# Patient Record
Sex: MALE | Race: WHITE | Employment: OTHER | URBAN - METROPOLITAN AREA
[De-identification: names, ages, dates, MRNs, and addresses within clinical notes are randomized per-mention and may not be internally consistent; named-entity substitution may affect disease eponyms.]

---

## 2017-02-15 ENCOUNTER — APPOINTMENT (OUTPATIENT)
Dept: CT IMAGING | Facility: HOSPITAL | Age: 52
DRG: 854 | End: 2017-02-15
Attending: EMERGENCY MEDICINE
Payer: COMMERCIAL

## 2017-02-15 ENCOUNTER — APPOINTMENT (OUTPATIENT)
Dept: MRI IMAGING | Facility: HOSPITAL | Age: 52
DRG: 854 | End: 2017-02-15
Attending: ORTHOPAEDIC SURGERY
Payer: COMMERCIAL

## 2017-02-15 ENCOUNTER — APPOINTMENT (OUTPATIENT)
Dept: GENERAL RADIOLOGY | Facility: HOSPITAL | Age: 52
DRG: 854 | End: 2017-02-15
Attending: RADIOLOGY
Payer: COMMERCIAL

## 2017-02-15 ENCOUNTER — APPOINTMENT (OUTPATIENT)
Dept: ULTRASOUND IMAGING | Facility: HOSPITAL | Age: 52
DRG: 854 | End: 2017-02-15
Attending: RADIOLOGY
Payer: COMMERCIAL

## 2017-02-15 ENCOUNTER — HOSPITAL ENCOUNTER (INPATIENT)
Facility: HOSPITAL | Age: 52
LOS: 8 days | Discharge: NURSING, REHAB OR LONG-TERM CARE | DRG: 854 | End: 2017-02-27
Attending: EMERGENCY MEDICINE | Admitting: HOSPITALIST
Payer: COMMERCIAL

## 2017-02-15 ENCOUNTER — APPOINTMENT (OUTPATIENT)
Dept: GENERAL RADIOLOGY | Facility: HOSPITAL | Age: 52
DRG: 854 | End: 2017-02-15
Attending: EMERGENCY MEDICINE
Payer: COMMERCIAL

## 2017-02-15 DIAGNOSIS — M79.2 NEUROPATHIC PAIN: ICD-10-CM

## 2017-02-15 DIAGNOSIS — M25.451 HIP JOINT EFFUSION, RIGHT: Primary | ICD-10-CM

## 2017-02-15 DIAGNOSIS — D68.9 COAGULOPATHY (HCC): ICD-10-CM

## 2017-02-15 DIAGNOSIS — I82.4Z1 ACUTE DEEP VEIN THROMBOSIS (DVT) OF DISTAL END OF RIGHT LOWER EXTREMITY (HCC): ICD-10-CM

## 2017-02-15 DIAGNOSIS — D50.0 IRON DEFICIENCY ANEMIA DUE TO CHRONIC BLOOD LOSS: ICD-10-CM

## 2017-02-15 DIAGNOSIS — M25.551 HIP PAIN, ACUTE, RIGHT: ICD-10-CM

## 2017-02-15 PROBLEM — M25.559 HIP PAIN, ACUTE: Status: ACTIVE | Noted: 2017-02-15

## 2017-02-15 LAB
ANION GAP SERPL CALC-SCNC: 5 MMOL/L (ref 0–18)
BASOPHILS # BLD: 0 K/UL (ref 0–0.2)
BASOPHILS NFR BLD: 0 %
BASOPHILS NFR FLD: 0 %
BUN SERPL-MCNC: 25 MG/DL (ref 8–20)
BUN/CREAT SERPL: 23.8 (ref 10–20)
CALCIUM SERPL-MCNC: 8.5 MG/DL (ref 8.5–10.5)
CHLORIDE SERPL-SCNC: 104 MMOL/L (ref 95–110)
CO2 SERPL-SCNC: 25 MMOL/L (ref 22–32)
CREAT SERPL-MCNC: 1.05 MG/DL (ref 0.5–1.5)
CRP SERPL-MCNC: 13.4 MG/DL (ref 0–0.9)
EOSINOPHIL # BLD: 0 K/UL (ref 0–0.7)
EOSINOPHIL NFR BLD: 0 %
EOSINOPHIL NFR FLD: 0 %
ERYTHROCYTE [DISTWIDTH] IN BLOOD BY AUTOMATED COUNT: 14.9 % (ref 11–15)
ERYTHROCYTE [SEDIMENTATION RATE] IN BLOOD: 50 MM/HR (ref 0–20)
FLUAV + FLUBV RNA SPEC NAA+PROBE: NEGATIVE
GLUCOSE BLDC GLUCOMTR-MCNC: 101 MG/DL (ref 70–99)
GLUCOSE BLDC GLUCOMTR-MCNC: 128 MG/DL (ref 70–99)
GLUCOSE BLDC GLUCOMTR-MCNC: 128 MG/DL (ref 70–99)
GLUCOSE BLDC GLUCOMTR-MCNC: 73 MG/DL (ref 70–99)
GLUCOSE SERPL-MCNC: 200 MG/DL (ref 70–99)
HCT VFR BLD AUTO: 38.6 % (ref 41–52)
HGB BLD-MCNC: 12.8 G/DL (ref 13.5–17.5)
HIV1+2 AB SERPL QL IA: REACTIVE
LYMPHOCYTES # BLD: 0.1 K/UL (ref 1–4)
LYMPHOCYTES NFR BLD: 1 %
LYMPHOCYTES NFR FLD: 0 %
MCH RBC QN AUTO: 28.7 PG (ref 27–32)
MCHC RBC AUTO-ENTMCNC: 33.2 G/DL (ref 32–37)
MCV RBC AUTO: 86.3 FL (ref 80–100)
MONOCYTES # BLD: 0.3 K/UL (ref 0–1)
MONOCYTES NFR BLD: 4 %
MONOCYTES NFR FLD: 0 %
MRSA DNA SPEC QL NAA+PROBE: POSITIVE
NEUTROPHILS # BLD AUTO: 6.4 K/UL (ref 1.8–7.7)
NEUTROPHILS NFR BLD: 87 %
NEUTROPHILS NFR FLD: 0 %
NEUTS BAND NFR BLD: 8 %
OSMOLALITY UR CALC.SUM OF ELEC: 288 MOSM/KG (ref 275–295)
PLATELET # BLD AUTO: 183 K/UL (ref 140–400)
PMV BLD AUTO: 7.5 FL (ref 7.4–10.3)
POTASSIUM SERPL-SCNC: 3.9 MMOL/L (ref 3.3–5.1)
RBC # BLD AUTO: 4.47 M/UL (ref 4.5–5.9)
RBC # FLD: ABNORMAL /CUMM (ref ?–1)
RHEUMATOID FACT SER QL: <5 IU/ML
SODIUM SERPL-SCNC: 134 MMOL/L (ref 136–144)
WBC # BLD AUTO: 6.8 K/UL (ref 4–11)
WBC # FLD: ABNORMAL /CUMM

## 2017-02-15 PROCEDURE — 0S993ZZ DRAINAGE OF RIGHT HIP JOINT, PERCUTANEOUS APPROACH: ICD-10-PCS | Performed by: RADIOLOGY

## 2017-02-15 PROCEDURE — 20611 DRAIN/INJ JOINT/BURSA W/US: CPT

## 2017-02-15 PROCEDURE — 99222 1ST HOSP IP/OBS MODERATE 55: CPT | Performed by: HOSPITALIST

## 2017-02-15 PROCEDURE — 73700 CT LOWER EXTREMITY W/O DYE: CPT

## 2017-02-15 PROCEDURE — 73560 X-RAY EXAM OF KNEE 1 OR 2: CPT

## 2017-02-15 RX ORDER — 0.9 % SODIUM CHLORIDE 0.9 %
VIAL (ML) INJECTION
Status: COMPLETED
Start: 2017-02-15 | End: 2017-02-15

## 2017-02-15 RX ORDER — HYDROMORPHONE HYDROCHLORIDE 1 MG/ML
0.5 INJECTION, SOLUTION INTRAMUSCULAR; INTRAVENOUS; SUBCUTANEOUS EVERY 2 HOUR PRN
Status: DISCONTINUED | OUTPATIENT
Start: 2017-02-15 | End: 2017-02-21

## 2017-02-15 RX ORDER — MORPHINE SULFATE 4 MG/ML
4 INJECTION, SOLUTION INTRAMUSCULAR; INTRAVENOUS ONCE
Status: COMPLETED | OUTPATIENT
Start: 2017-02-15 | End: 2017-02-15

## 2017-02-15 RX ORDER — HYDROCODONE BITARTRATE AND ACETAMINOPHEN 10; 325 MG/1; MG/1
1 TABLET ORAL EVERY 4 HOURS PRN
Status: DISCONTINUED | OUTPATIENT
Start: 2017-02-15 | End: 2017-02-21

## 2017-02-15 RX ORDER — SODIUM CHLORIDE 9 MG/ML
INJECTION, SOLUTION INTRAVENOUS CONTINUOUS
Status: DISPENSED | OUTPATIENT
Start: 2017-02-15 | End: 2017-02-15

## 2017-02-15 RX ORDER — KETOROLAC TROMETHAMINE 30 MG/ML
60 INJECTION, SOLUTION INTRAMUSCULAR; INTRAVENOUS ONCE
Status: COMPLETED | OUTPATIENT
Start: 2017-02-15 | End: 2017-02-15

## 2017-02-15 RX ORDER — HEPARIN SODIUM 5000 [USP'U]/ML
5000 INJECTION, SOLUTION INTRAVENOUS; SUBCUTANEOUS EVERY 12 HOURS
Status: DISCONTINUED | OUTPATIENT
Start: 2017-02-15 | End: 2017-02-16

## 2017-02-15 RX ORDER — ACETAMINOPHEN 325 MG/1
650 TABLET ORAL EVERY 6 HOURS PRN
Status: DISCONTINUED | OUTPATIENT
Start: 2017-02-15 | End: 2017-02-27

## 2017-02-15 RX ORDER — CYCLOBENZAPRINE HCL 10 MG
10 TABLET ORAL 3 TIMES DAILY PRN
Status: DISCONTINUED | OUTPATIENT
Start: 2017-02-15 | End: 2017-02-27

## 2017-02-15 RX ORDER — CYCLOBENZAPRINE HCL 10 MG
10 TABLET ORAL 3 TIMES DAILY PRN
Status: ON HOLD | COMMUNITY
End: 2018-10-20

## 2017-02-15 RX ORDER — DEXTROSE MONOHYDRATE 25 G/50ML
50 INJECTION, SOLUTION INTRAVENOUS AS NEEDED
Status: DISCONTINUED | OUTPATIENT
Start: 2017-02-15 | End: 2017-02-27

## 2017-02-15 RX ORDER — ONDANSETRON 2 MG/ML
4 INJECTION INTRAMUSCULAR; INTRAVENOUS EVERY 6 HOURS PRN
Status: DISCONTINUED | OUTPATIENT
Start: 2017-02-15 | End: 2017-02-27

## 2017-02-15 RX ORDER — OSELTAMIVIR PHOSPHATE 75 MG/1
75 CAPSULE ORAL 2 TIMES DAILY
Status: ON HOLD | COMMUNITY
End: 2017-02-27

## 2017-02-15 RX ORDER — HYDROMORPHONE HYDROCHLORIDE 1 MG/ML
1 INJECTION, SOLUTION INTRAMUSCULAR; INTRAVENOUS; SUBCUTANEOUS EVERY 2 HOUR PRN
Status: DISCONTINUED | OUTPATIENT
Start: 2017-02-15 | End: 2017-02-21

## 2017-02-15 NOTE — PHYSICAL THERAPY NOTE
Spoke with RN Aiden Hanley who reports patient is scheduled for an US guided aspiration to r/o infection. Plan to follow up later today, still awaiting ortho consult as well. Will see patient when appropriate. RN aware.

## 2017-02-15 NOTE — CONSULTS
INFECTIOUS DISEASE CONSULT NOTE    Vishal Morse Patient Status:  Observation    3/31/1965 MRN [id number]   Location [facility] 4W/SW/SE Attending Booker Witt MD   Hosp Day # 0 PCP No evelin tab 1 tablet, 1 tablet, Oral, Q4H PRN  •  HYDROmorphone HCl PF (DILAUDID) 1 MG/ML injection 0.5 mg, 0.5 mg, Intravenous, Q2H PRN **OR** HYDROmorphone HCl PF (DILAUDID) 1 MG/ML injection 1 mg, 1 mg, Intravenous, Q2H PRN  •  Heparin Sodium (Porcine) 5000 UNI MCH  28.7   MCHC  33.2   RDW  14.9   WBC  6.8   PLT  183     Recent Labs   Lab  02/15/17   0624   GLU  200*   BUN  25*   CREATSERUM  1.05   GFRAA  >60   GFRNAA  >60   CA  8.5   NA  134*   K  3.9   CL  104   CO2  25       Microbiology    Reviewed in EMR

## 2017-02-15 NOTE — IMAGING NOTE
PATIENT ARRIVED VIA CART. DI STUDENT SRINATH IRAHETA PRESENT TO INTERPRET. CONSENT OBTAINED.  DR UGALDE NOTIFIED. 126/71 HR 96.  1150  IMAGES DONE AND REVIEWED BY DR UGALDE  SITE VERIFIED AND PEP WITH CHLORAPREP AND 1% LIDOCAINE  SITE ASPIRATED 3

## 2017-02-15 NOTE — PAYOR COMM NOTE
OBSERVATION  Assessment and Plan:    Intractable right hip and knee pain  CT scan suggestive severe degenerative changes with effusion and right hip as well, orthopedics has been consulted along with IR for possible tapping of hip joint.  Will use Dilaudid

## 2017-02-15 NOTE — ED NOTES
Patient c/o right knee pain and right hip pain after fall this AM while using crutches. Patient denies any LOC- admits to being discharged from [address] [place] [address] 2 days ago for prior fall. Swelling noted to right knee- patient c/o of 10/10 pain.   +CMS, li

## 2017-02-15 NOTE — PROCEDURES
[facility]  Procedure Note    Selwyn Majano Patient Status:  Observation    3/31/1965 MRN [id number]   Location [facility] 4W/SW/SE Attending Moises Lucero MD   Hosp Day # 0 PCP No primary care provider on file.      Procedure: u/

## 2017-02-15 NOTE — PLAN OF CARE
Yasmin Brooks RN informed of marcus's past medical hx of HIV, mrsa +, and r/o flu.   I also informed Maxim Sang his primary language is Mongolian and that there are no signed consents on chart pt has questions for dr. Bahenaid 1 mg given ivp as he was on cart read

## 2017-02-15 NOTE — ED PROVIDER NOTES
Patient Seen in: [facility] Emergency Department    History   Patient presents with:  Hip Pain      HPI    Patient presents complaining of severe right hip and knee pain after falling while using his crutches this morning.   Patient states that he otherwise stated in HPI.     Physical Exam       ED Triage Vitals   BP 02/15/17 0310 138/82 mmHg   Pulse 02/15/17 0310 104   Resp 02/15/17 0310 18   Temp 02/15/17 0310 99.6 °F (37.6 °C)   Temp src --    SpO2 02/15/17 0310 97 %   O2 Device 02/15/17 0310 None RAINBOW DRAW LAVENDER   RAINBOW DRAW LIGHT GREEN   RAINBOW DRAW DARK GREEN   RAINBOW DRAW LAVENDER TALL (BNP)   ED/MRSA SCREEN BY PCR-CC   CBC W/ DIFFERENTIAL       Preliminary Radiology Report  [facility], [facility]  [phone] and interventional radiology, Dr. Brianna Flor for care.       Disposition and Plan     Clinical Impression:  Hip pain, acute, right  (primary encounter diagnosis)  Hip joint effusion, right    Disposition:  Admit    Follow-up:  No follow-up provider specified

## 2017-02-15 NOTE — PROGRESS NOTES
ADDENDUM: please see Dr. Ron Vera H&P from this morning. I saw the patient in follow up with the assistance of the language line over the phone.     51M with h/o asthma, HIV not on treatment, \"heart problem\" and \"fast heart rate\" who presents with right h

## 2017-02-15 NOTE — H&P
[address] Colon[facility] Patient Status:  Observation    3/31/1965 MRN [id number]   Location [facility] 4W/SW/SE Attending Remington Merrill MD   Hosp Day # 0 PCP No primary care provider on file.      D Normal conjunctiva. HENT:  Normocephalic, oral mucosa is moist.  Head:  Normocephalic, atraumatic. Neck:  Supple, non-tender, no carotid bruit, no jugular venous distention, no lymphadenopathy, no thyromegaly.   Respiratory:  Lungs are clear to auscultati follow-up at HIV clinic    Prophylaxis  Subcutaneous heparin    CODE STATUS  Full    Primary care physician  No primary care provider on file.     Disposition  Clinical course will dictate outcome      Noé Corey MD  2/15/2017  6:54 AM

## 2017-02-16 ENCOUNTER — APPOINTMENT (OUTPATIENT)
Dept: MRI IMAGING | Facility: HOSPITAL | Age: 52
DRG: 854 | End: 2017-02-16
Attending: ORTHOPAEDIC SURGERY
Payer: COMMERCIAL

## 2017-02-16 ENCOUNTER — APPOINTMENT (OUTPATIENT)
Dept: NUCLEAR MEDICINE | Facility: HOSPITAL | Age: 52
DRG: 854 | End: 2017-02-16
Attending: HOSPITALIST
Payer: COMMERCIAL

## 2017-02-16 ENCOUNTER — APPOINTMENT (OUTPATIENT)
Dept: CV DIAGNOSTICS | Facility: HOSPITAL | Age: 52
DRG: 854 | End: 2017-02-16
Attending: HOSPITALIST
Payer: COMMERCIAL

## 2017-02-16 LAB
ANION GAP SERPL CALC-SCNC: 8 MMOL/L (ref 0–18)
BASOPHILS # BLD: 0 K/UL (ref 0–0.2)
BASOPHILS NFR BLD: 0 %
BUN SERPL-MCNC: 19 MG/DL (ref 8–20)
BUN/CREAT SERPL: 18.8 (ref 10–20)
CALCIUM SERPL-MCNC: 7.8 MG/DL (ref 8.5–10.5)
CD3+CD4+ CELLS # BLD: 13 /MM3
CD3+CD4+ CELLS NFR BLD: 20 %
CD3+CD4+ CELLS/CD3+CD8+ CLL SPEC: 0.4
CD3+CD8+ CELLS NFR SPEC: 50 %
CHLORIDE SERPL-SCNC: 102 MMOL/L (ref 95–110)
CO2 SERPL-SCNC: 20 MMOL/L (ref 22–32)
CREAT SERPL-MCNC: 1.01 MG/DL (ref 0.5–1.5)
EOSINOPHIL # BLD: 0 K/UL (ref 0–0.7)
EOSINOPHIL NFR BLD: 0 %
ERYTHROCYTE [DISTWIDTH] IN BLOOD BY AUTOMATED COUNT: 15.4 % (ref 11–15)
GLUCOSE BLDC GLUCOMTR-MCNC: 119 MG/DL (ref 70–99)
GLUCOSE BLDC GLUCOMTR-MCNC: 150 MG/DL (ref 70–99)
GLUCOSE BLDC GLUCOMTR-MCNC: 179 MG/DL (ref 70–99)
GLUCOSE BLDC GLUCOMTR-MCNC: 65 MG/DL (ref 70–99)
GLUCOSE SERPL-MCNC: 163 MG/DL (ref 70–99)
HBA1C MFR BLD: 6 % (ref 4–6)
HCT VFR BLD AUTO: 39.4 % (ref 41–52)
HGB BLD-MCNC: 13.2 G/DL (ref 13.5–17.5)
HIV-1 AB DIFFERENTIATION: POSITIVE
LYMPHOCYTES # BLD: 0.6 K/UL (ref 1–4)
LYMPHOCYTES NFR BLD: 7 %
MCH RBC QN AUTO: 28.5 PG (ref 27–32)
MCHC RBC AUTO-ENTMCNC: 33.6 G/DL (ref 32–37)
MCV RBC AUTO: 85 FL (ref 80–100)
MONOCYTES # BLD: 0.4 K/UL (ref 0–1)
MONOCYTES NFR BLD: 5 %
NEUTROPHILS # BLD AUTO: 7.1 K/UL (ref 1.8–7.7)
NEUTROPHILS NFR BLD: 81 %
NEUTS BAND NFR BLD: 7 %
OSMOLALITY UR CALC.SUM OF ELEC: 276 MOSM/KG (ref 275–295)
PLATELET # BLD AUTO: 165 K/UL (ref 140–400)
PMV BLD AUTO: 7.8 FL (ref 7.4–10.3)
POTASSIUM SERPL-SCNC: 3.6 MMOL/L (ref 3.3–5.1)
RBC # BLD AUTO: 4.63 M/UL (ref 4.5–5.9)
SODIUM SERPL-SCNC: 130 MMOL/L (ref 136–144)
WBC # BLD AUTO: 8 K/UL (ref 4–11)

## 2017-02-16 PROCEDURE — 73721 MRI JNT OF LWR EXTRE W/O DYE: CPT

## 2017-02-16 PROCEDURE — 93306 TTE W/DOPPLER COMPLETE: CPT | Performed by: INTERNAL MEDICINE

## 2017-02-16 PROCEDURE — 93018 CV STRESS TEST I&R ONLY: CPT | Performed by: INTERNAL MEDICINE

## 2017-02-16 PROCEDURE — 78452 HT MUSCLE IMAGE SPECT MULT: CPT

## 2017-02-16 PROCEDURE — 93017 CV STRESS TEST TRACING ONLY: CPT

## 2017-02-16 PROCEDURE — 99233 SBSQ HOSP IP/OBS HIGH 50: CPT | Performed by: HOSPITALIST

## 2017-02-16 PROCEDURE — 93016 CV STRESS TEST SUPVJ ONLY: CPT | Performed by: INTERNAL MEDICINE

## 2017-02-16 PROCEDURE — 93306 TTE W/DOPPLER COMPLETE: CPT

## 2017-02-16 RX ORDER — HEPARIN SODIUM 5000 [USP'U]/ML
5000 INJECTION, SOLUTION INTRAVENOUS; SUBCUTANEOUS EVERY 12 HOURS
Status: DISCONTINUED | OUTPATIENT
Start: 2017-02-16 | End: 2017-02-19

## 2017-02-16 RX ORDER — 0.9 % SODIUM CHLORIDE 0.9 %
VIAL (ML) INJECTION
Status: COMPLETED
Start: 2017-02-16 | End: 2017-02-16

## 2017-02-16 NOTE — PHYSICAL THERAPY NOTE
PHYSICAL THERAPY EVALUATION - INPATIENT     Room Number: 184/427-N  Evaluation Date: 2/16/2017  Type of Evaluation: Initial       Presenting Problem:  (pain RLE)  Reason for Therapy: Mobility Dysfunction and Discharge Planning    History related to currjacqueline wheelchair)?: A Lot   -   Need to walk in hospital room?: Total   -   Climbing 3-5 steps with a railing?: Total       AM-PAC Score:  Raw Score: 10   PT Approx Degree of Impairment Score: 76.75%   Standardized Score (AM-PAC Scale): 32.29   CMS Modifier (G-C

## 2017-02-16 NOTE — PAYOR COMM NOTE
OBSERVATION    Assessment and Plan:    Intractable right hip and knee pain  CT scan suggestive severe degenerative changes with effusion and right hip as well, orthopedics has been consulted along with IR for possible tapping of hip joint.  Will use Dilaud

## 2017-02-16 NOTE — PROGRESS NOTES
[facility] dosing service    Initial Pharmacokinetic Consult for Vancomycin Dosing     Catarina Villagomez is a 45year old male who is being treated for right hip infection.   Pharmacy has been asked to dose Vancomycin by Dr. Kristen Burris    He has No Known Al available.     INDICATIONS: Right hip and  knee pain after fall. Severe tenderness of the right hip on palpation.     TECHNIQUE: Multidetector CT images of the right hip were acquired without the infusion intravenous contrast medium.  Bone and soft tissue w advanced osseous erosions, subchondral cystic changes, and right acetabular protrusio.     3. There is a large, complex right hip effusion. Given the clinical history and examination findings, superimposed infection/septic arthritis cannot be excluded.  Art

## 2017-02-16 NOTE — PROGRESS NOTES
INFECTIOUS DISEASE PROGRESS NOTE    Benedicto Rom Patient Status:  Observation    3/31/1965 MRN [id number]   Location [facility] 4W/SW/SE Attending Wallis Lombard, MD   Hosp Day # 1 PCP No primary care provider on file.      Subjective:  continues aspiration with 3 mL purulent fluid removed.      # R hip septic arthritis s/p aspiration 2/15/17 with purulence - +S. pneumo    - aspiration with ,056, no crystals  # S. pneumo bacteremia   # Multiple joints involved including R shoulder bilateral M

## 2017-02-16 NOTE — PROGRESS NOTES
[facility]    Progress Note    Demaris Kathia Patient Status:  Observation    3/31/1965 MRN [id number]   Location [facility] 4W/SW/SE Attending Kristina Aceves MD   Hosp Day # 1 PCP No primary care provider on file.      Subjective: advanced osseous erosions, subchondral cystic changes, and right acetabular protrusio. 3. There is a large, complex right hip effusion. Given the clinical history and examination findings, superimposed infection/septic arthritis cannot be excluded.  Arthro +GPC and GNR  - blood cx x2 +GPC  - MRI shows moderate effusion and synovitis, no evidence of osteomyelitis  - ID and ortho on consult  - Vancomycin IV, cefepime IV  - Preop w/u - stress test and echo ok.  Would be ok to proceed with surgery if deemed neces

## 2017-02-16 NOTE — CONSULTS
[facility]    PATIENT'S NAME: Elizabeth Loraine PHYSICIAN: Wolf Yu MD   CONSULTING PHYSICIAN: Glenroy Worthy MD   PATIENT ACCOUNT#:   [id number]    LOCATION:  [address] [place] [address] #:   [id number]       DATE OF BIRTH:  [birth year] 20 degrees. He has inability to extend it. He has evidence of significant degenerative joint disease to the right knee. The right hip is maintained in a slight flexed position. He is comfortable in bed, but attempted motion is painful for him.       LAB a surgical excision of the bone to the right hip, as well as placement of an antibiotic spacer. This may prove beneficial to be done in a university setting. Options were discussed with the patient. We are awaiting further information to be obtained.

## 2017-02-17 ENCOUNTER — APPOINTMENT (OUTPATIENT)
Dept: GENERAL RADIOLOGY | Facility: HOSPITAL | Age: 52
DRG: 854 | End: 2017-02-17
Attending: INTERNAL MEDICINE
Payer: COMMERCIAL

## 2017-02-17 LAB
ANION GAP SERPL CALC-SCNC: 6 MMOL/L (ref 0–18)
BASOPHILS # BLD: 0 K/UL (ref 0–0.2)
BASOPHILS NFR BLD: 0 %
BUN SERPL-MCNC: 15 MG/DL (ref 8–20)
BUN/CREAT SERPL: 16 (ref 10–20)
CALCIUM SERPL-MCNC: 7.3 MG/DL (ref 8.5–10.5)
CHLORIDE SERPL-SCNC: 104 MMOL/L (ref 95–110)
CO2 SERPL-SCNC: 22 MMOL/L (ref 22–32)
CREAT SERPL-MCNC: 0.94 MG/DL (ref 0.5–1.5)
EOSINOPHIL # BLD: 0 K/UL (ref 0–0.7)
EOSINOPHIL NFR BLD: 1 %
ERYTHROCYTE [DISTWIDTH] IN BLOOD BY AUTOMATED COUNT: 15 % (ref 11–15)
GLUCOSE BLDC GLUCOMTR-MCNC: 134 MG/DL (ref 70–99)
GLUCOSE BLDC GLUCOMTR-MCNC: 147 MG/DL (ref 70–99)
GLUCOSE BLDC GLUCOMTR-MCNC: 89 MG/DL (ref 70–99)
GLUCOSE SERPL-MCNC: 96 MG/DL (ref 70–99)
HCT VFR BLD AUTO: 33.2 % (ref 41–52)
HGB BLD-MCNC: 11 G/DL (ref 13.5–17.5)
LYMPHOCYTES # BLD: 0.3 K/UL (ref 1–4)
LYMPHOCYTES NFR BLD: 3 %
MCH RBC QN AUTO: 28.5 PG (ref 27–32)
MCHC RBC AUTO-ENTMCNC: 33.2 G/DL (ref 32–37)
MCV RBC AUTO: 85.8 FL (ref 80–100)
MONOCYTES # BLD: 0.2 K/UL (ref 0–1)
MONOCYTES NFR BLD: 3 %
NEUTROPHILS # BLD AUTO: 7.2 K/UL (ref 1.8–7.7)
NEUTROPHILS NFR BLD: 93 %
OSMOLALITY UR CALC.SUM OF ELEC: 275 MOSM/KG (ref 275–295)
PLATELET # BLD AUTO: 124 K/UL (ref 140–400)
PMV BLD AUTO: 8 FL (ref 7.4–10.3)
POTASSIUM SERPL-SCNC: 3.6 MMOL/L (ref 3.3–5.1)
RBC # BLD AUTO: 3.87 M/UL (ref 4.5–5.9)
SODIUM SERPL-SCNC: 132 MMOL/L (ref 136–144)
WBC # BLD AUTO: 7.7 K/UL (ref 4–11)

## 2017-02-17 PROCEDURE — 99233 SBSQ HOSP IP/OBS HIGH 50: CPT | Performed by: HOSPITALIST

## 2017-02-17 PROCEDURE — 71010 XR CHEST AP PORTABLE  (CPT=71010): CPT

## 2017-02-17 RX ORDER — 0.9 % SODIUM CHLORIDE 0.9 %
VIAL (ML) INJECTION
Status: COMPLETED
Start: 2017-02-17 | End: 2017-02-17

## 2017-02-17 NOTE — PROGRESS NOTES
[facility]    Progress Note    Marisela Cooley Patient Status:  Observation    3/31/1965 MRN [id number]   Location [facility] 4W/SW/SE Attending Bartolo Bamberger, MD   Hosp Day # 2 PCP No primary care provider on file.      Subjective: aspiration is recommended. 2. Moderate soft tissue swelling overlying the right hip, nonspecific 3. No evidence of osteomyelitis.  4. Muscle edema most prominent in the gluteus minimus and short adductors         Xr Chest Ap Portable  (cpt=71010)    2/17/20

## 2017-02-17 NOTE — PROGRESS NOTES
INFECTIOUS DISEASE PROGRESS NOTE    Erenest Query Patient Status:  Observation    3/31/1965 MRN [id number]   Location [facility] 4W/SW/SE Attending Geraldine Keith MD   Hosp Day # 2 PCP No primary care provider on file.      Subjective:  continues CT R knee with no osseous injury with mild tricomprtmental OA. Trace effusion. Seen by IR 2/15 and s/p US R hip aspiration with 3 mL purulent fluid removed.      # R hip septic arthritis s/p aspiration 2/15/17 with purulence - +S. pneumo    - aspiration wit

## 2017-02-17 NOTE — PROGRESS NOTES
Ceftriaxone 2g IV q24h was ordered by Dr. Alisson Alas.     Wt Readings from Last 6 Encounters:  02/15/17 : 172 lb  (78 kg)    Based on body weight < 100 kg, the ceftriaxone order was changed to 1g IV q24h

## 2017-02-18 LAB
ALBUMIN SERPL BCP-MCNC: 1.9 G/DL (ref 3.5–4.8)
ALBUMIN/GLOB SERPL: 0.5 {RATIO} (ref 1–2)
ALP SERPL-CCNC: 67 U/L (ref 32–100)
ALT SERPL-CCNC: 30 U/L (ref 17–63)
ANION GAP SERPL CALC-SCNC: 5 MMOL/L (ref 0–18)
ANTIBODY SCREEN: NEGATIVE
APTT PPP: 28.9 SECONDS (ref 23.2–35.3)
AST SERPL-CCNC: 37 U/L (ref 15–41)
BACTERIA UR QL AUTO: NEGATIVE /HPF
BASOPHILS # BLD: 0 K/UL (ref 0–0.2)
BASOPHILS NFR BLD: 0 %
BILIRUB DIRECT SERPL-MCNC: 0.2 MG/DL (ref 0–0.2)
BILIRUB SERPL-MCNC: 0.9 MG/DL (ref 0.3–1.2)
BUN SERPL-MCNC: 15 MG/DL (ref 8–20)
BUN/CREAT SERPL: 15.2 (ref 10–20)
CALCIUM SERPL-MCNC: 7.6 MG/DL (ref 8.5–10.5)
CHLORIDE SERPL-SCNC: 101 MMOL/L (ref 95–110)
CO2 SERPL-SCNC: 25 MMOL/L (ref 22–32)
CREAT SERPL-MCNC: 0.99 MG/DL (ref 0.5–1.5)
EOSINOPHIL # BLD: 0.1 K/UL (ref 0–0.7)
EOSINOPHIL NFR BLD: 1 %
ERYTHROCYTE [DISTWIDTH] IN BLOOD BY AUTOMATED COUNT: 15.2 % (ref 11–15)
GLOBULIN PLAS-MCNC: 4 G/DL (ref 2.5–3.7)
GLUCOSE BLDC GLUCOMTR-MCNC: 129 MG/DL (ref 70–99)
GLUCOSE BLDC GLUCOMTR-MCNC: 78 MG/DL (ref 70–99)
GLUCOSE BLDC GLUCOMTR-MCNC: 87 MG/DL (ref 70–99)
GLUCOSE BLDC GLUCOMTR-MCNC: 90 MG/DL (ref 70–99)
GLUCOSE SERPL-MCNC: 96 MG/DL (ref 70–99)
HCT VFR BLD AUTO: 33.8 % (ref 41–52)
HGB BLD-MCNC: 11.3 G/DL (ref 13.5–17.5)
HIV-1 QNT BY NAAT (LOG COPIES/ML): 3.7 LOG
HIV-1 QNT BY NAAT INTERP: DETECTED
INR BLD: 1.2 (ref 0.9–1.2)
LYMPHOCYTES # BLD: 0.2 K/UL (ref 1–4)
LYMPHOCYTES NFR BLD: 3 %
MCH RBC QN AUTO: 28.4 PG (ref 27–32)
MCHC RBC AUTO-ENTMCNC: 33.4 G/DL (ref 32–37)
MCV RBC AUTO: 85.1 FL (ref 80–100)
MONOCYTES # BLD: 0.3 K/UL (ref 0–1)
MONOCYTES NFR BLD: 5 %
NEUTROPHILS # BLD AUTO: 6 K/UL (ref 1.8–7.7)
NEUTROPHILS NFR BLD: 90 %
OSMOLALITY UR CALC.SUM OF ELEC: 273 MOSM/KG (ref 275–295)
PLATELET # BLD AUTO: 158 K/UL (ref 140–400)
PMV BLD AUTO: 7.8 FL (ref 7.4–10.3)
POTASSIUM SERPL-SCNC: 3.6 MMOL/L (ref 3.3–5.1)
PROT SERPL-MCNC: 5.9 G/DL (ref 5.9–8.4)
PROTHROMBIN TIME: 14.8 SECONDS (ref 11.8–14.5)
RBC # BLD AUTO: 3.97 M/UL (ref 4.5–5.9)
RBC #/AREA URNS AUTO: 8 /HPF
RH BLOOD TYPE: POSITIVE
SODIUM SERPL-SCNC: 131 MMOL/L (ref 136–144)
WBC # BLD AUTO: 6.7 K/UL (ref 4–11)
WBC #/AREA URNS AUTO: 1 /HPF

## 2017-02-18 PROCEDURE — 0S990ZZ DRAINAGE OF RIGHT HIP JOINT, OPEN APPROACH: ICD-10-PCS | Performed by: ORTHOPAEDIC SURGERY

## 2017-02-18 PROCEDURE — 99233 SBSQ HOSP IP/OBS HIGH 50: CPT | Performed by: HOSPITALIST

## 2017-02-18 RX ORDER — POTASSIUM CHLORIDE 14.9 MG/ML
20 INJECTION INTRAVENOUS ONCE
Status: COMPLETED | OUTPATIENT
Start: 2017-02-18 | End: 2017-02-18

## 2017-02-18 RX ORDER — 0.9 % SODIUM CHLORIDE 0.9 %
VIAL (ML) INJECTION
Status: COMPLETED
Start: 2017-02-18 | End: 2017-02-18

## 2017-02-18 RX ORDER — METOPROLOL SUCCINATE 25 MG/1
12.5 TABLET, EXTENDED RELEASE ORAL
Status: DISCONTINUED | OUTPATIENT
Start: 2017-02-18 | End: 2017-02-24

## 2017-02-18 RX ORDER — FLUCONAZOLE 200 MG/1
200 TABLET ORAL DAILY
Status: DISCONTINUED | OUTPATIENT
Start: 2017-02-18 | End: 2017-02-27

## 2017-02-18 RX ORDER — POTASSIUM CHLORIDE 20 MEQ/1
40 TABLET, EXTENDED RELEASE ORAL EVERY 4 HOURS
Status: DISCONTINUED | OUTPATIENT
Start: 2017-02-18 | End: 2017-02-18

## 2017-02-18 RX ORDER — SODIUM CHLORIDE 9 MG/ML
INJECTION, SOLUTION INTRAVENOUS
Status: COMPLETED
Start: 2017-02-18 | End: 2017-02-18

## 2017-02-18 NOTE — PROGRESS NOTES
[facility]    Progress Note    Roger Gomez Patient Status:  Observation    3/31/1965 MRN [id number]   Location [facility] 4W/SW/SE Attending Elia Santos MD   Hosp Day # 3 PCP No primary care provider on file.      Subjective: Chest Ap Portable  (cpt=71010)    2/17/2017  CONCLUSION:  1. No acute findings. 2. Emphysema with pulmonary fibrosis. 3. Right upper lobe nodule.              Medications:  • sodium chloride       • metoprolol succinate  12.5 mg Oral 2x Daily(Beta Blocker)

## 2017-02-18 NOTE — PHYSICAL THERAPY NOTE
PHYSICAL THERAPY TREATMENT NOTE - INPATIENT    Room Number: 768/648-W       Presenting Problem:  (pain RLE)    Problem List  Principal Problem:    Hip pain, acute, right  Active Problems:    Hip pain, acute    Hip joint effusion, right      ASSESSMENT   I educated on deep breathing,relaxation and energy conservation technique. Bed mobility,functional transfer,ther ex.     THERAPEUTIC EXERCISES  Lower Extremity AP,QS,GS,abd/add,HS     Position supine       Patient End of Session: Up in chair;Call light within

## 2017-02-18 NOTE — CONSULTS
Lynda NOTE  Cardiology Consultation    Cherie Staples Patient Status:  Observation    3/31/1965 MRN [id number]   Location [facility] 4W/SW/SE Attending Gracy Liang MD   Hosp Day # 3 PCP No primary care provider on file. facility-administered medications:   •  CefTRIAXone Sodium (ROCEPHIN) 1 g in sodium chloride 0.9 % 100 mL IVPB-minibag, 1 g, Intravenous, Q24H  •  Heparin Sodium (Porcine) 5000 UNIT/ML injection 5,000 Units, 5,000 Units, Subcutaneous, Q12H  •  acetaminophe Urine (mL/kg/hr) 1350 (0.7) 2050 (1.1)     Total Output 1350 2050      Net +1252 -270                    Last 3 Weights  02/15/17 0310 : 172 lb (78.019 kg)            Physical Exam:  Physical Exam:  The patient appears alert uncomfortable right hip and kne

## 2017-02-19 ENCOUNTER — SURGERY (OUTPATIENT)
Age: 52
End: 2017-02-19

## 2017-02-19 ENCOUNTER — ANESTHESIA EVENT (OUTPATIENT)
Dept: SURGERY | Facility: HOSPITAL | Age: 52
DRG: 854 | End: 2017-02-19
Payer: COMMERCIAL

## 2017-02-19 ENCOUNTER — ANESTHESIA (OUTPATIENT)
Dept: SURGERY | Facility: HOSPITAL | Age: 52
DRG: 854 | End: 2017-02-19
Payer: COMMERCIAL

## 2017-02-19 LAB
ANION GAP SERPL CALC-SCNC: 3 MMOL/L (ref 0–18)
BASOPHILS # BLD: 0 K/UL (ref 0–0.2)
BASOPHILS NFR BLD: 0 %
BUN SERPL-MCNC: 14 MG/DL (ref 8–20)
BUN/CREAT SERPL: 15.2 (ref 10–20)
CALCIUM SERPL-MCNC: 7.2 MG/DL (ref 8.5–10.5)
CHLORIDE SERPL-SCNC: 98 MMOL/L (ref 95–110)
CO2 SERPL-SCNC: 27 MMOL/L (ref 22–32)
CREAT SERPL-MCNC: 0.92 MG/DL (ref 0.5–1.5)
EOSINOPHIL # BLD: 0.1 K/UL (ref 0–0.7)
EOSINOPHIL NFR BLD: 2 %
ERYTHROCYTE [DISTWIDTH] IN BLOOD BY AUTOMATED COUNT: 14.6 % (ref 11–15)
GLUCOSE BLDC GLUCOMTR-MCNC: 107 MG/DL (ref 70–99)
GLUCOSE BLDC GLUCOMTR-MCNC: 159 MG/DL (ref 70–99)
GLUCOSE BLDC GLUCOMTR-MCNC: 94 MG/DL (ref 70–99)
GLUCOSE SERPL-MCNC: 95 MG/DL (ref 70–99)
HCT VFR BLD AUTO: 36.6 % (ref 41–52)
HGB BLD-MCNC: 12.2 G/DL (ref 13.5–17.5)
LYMPHOCYTES # BLD: 0.2 K/UL (ref 1–4)
LYMPHOCYTES NFR BLD: 5 %
MCH RBC QN AUTO: 28.4 PG (ref 27–32)
MCHC RBC AUTO-ENTMCNC: 33.3 G/DL (ref 32–37)
MCV RBC AUTO: 85.1 FL (ref 80–100)
MONOCYTES # BLD: 0.3 K/UL (ref 0–1)
MONOCYTES NFR BLD: 9 %
NEUTROPHILS # BLD AUTO: 2.9 K/UL (ref 1.8–7.7)
NEUTROPHILS NFR BLD: 84 %
OSMOLALITY UR CALC.SUM OF ELEC: 266 MOSM/KG (ref 275–295)
PLATELET # BLD AUTO: 187 K/UL (ref 140–400)
PMV BLD AUTO: 7.7 FL (ref 7.4–10.3)
POTASSIUM SERPL-SCNC: 3.7 MMOL/L (ref 3.3–5.1)
POTASSIUM SERPL-SCNC: 3.7 MMOL/L (ref 3.3–5.1)
RBC # BLD AUTO: 4.3 M/UL (ref 4.5–5.9)
SODIUM SERPL-SCNC: 128 MMOL/L (ref 136–144)
WBC # BLD AUTO: 3.5 K/UL (ref 4–11)

## 2017-02-19 PROCEDURE — 99233 SBSQ HOSP IP/OBS HIGH 50: CPT | Performed by: HOSPITALIST

## 2017-02-19 RX ORDER — BUPIVACAINE HYDROCHLORIDE 2.5 MG/ML
INJECTION, SOLUTION EPIDURAL; INFILTRATION; INTRACAUDAL AS NEEDED
Status: DISCONTINUED | OUTPATIENT
Start: 2017-02-19 | End: 2017-02-19

## 2017-02-19 RX ORDER — HYDROMORPHONE HYDROCHLORIDE 1 MG/ML
0.6 INJECTION, SOLUTION INTRAMUSCULAR; INTRAVENOUS; SUBCUTANEOUS EVERY 5 MIN PRN
Status: DISCONTINUED | OUTPATIENT
Start: 2017-02-19 | End: 2017-02-19 | Stop reason: HOSPADM

## 2017-02-19 RX ORDER — SODIUM CHLORIDE 9 MG/ML
INJECTION, SOLUTION INTRAVENOUS CONTINUOUS PRN
Status: DISCONTINUED | OUTPATIENT
Start: 2017-02-19 | End: 2017-02-19 | Stop reason: SURG

## 2017-02-19 RX ORDER — PHENYLEPHRINE HCL 10 MG/ML
VIAL (ML) INJECTION AS NEEDED
Status: DISCONTINUED | OUTPATIENT
Start: 2017-02-19 | End: 2017-02-19 | Stop reason: SURG

## 2017-02-19 RX ORDER — HYDROCODONE BITARTRATE AND ACETAMINOPHEN 5; 325 MG/1; MG/1
1 TABLET ORAL AS NEEDED
Status: DISCONTINUED | OUTPATIENT
Start: 2017-02-19 | End: 2017-02-19 | Stop reason: HOSPADM

## 2017-02-19 RX ORDER — LIDOCAINE HYDROCHLORIDE 10 MG/ML
INJECTION, SOLUTION EPIDURAL; INFILTRATION; INTRACAUDAL; PERINEURAL AS NEEDED
Status: DISCONTINUED | OUTPATIENT
Start: 2017-02-19 | End: 2017-02-19 | Stop reason: SURG

## 2017-02-19 RX ORDER — SODIUM CHLORIDE, SODIUM LACTATE, POTASSIUM CHLORIDE, CALCIUM CHLORIDE 600; 310; 30; 20 MG/100ML; MG/100ML; MG/100ML; MG/100ML
INJECTION, SOLUTION INTRAVENOUS CONTINUOUS
Status: DISCONTINUED | OUTPATIENT
Start: 2017-02-19 | End: 2017-02-20

## 2017-02-19 RX ORDER — HYDROMORPHONE HYDROCHLORIDE 1 MG/ML
0.2 INJECTION, SOLUTION INTRAMUSCULAR; INTRAVENOUS; SUBCUTANEOUS EVERY 5 MIN PRN
Status: DISCONTINUED | OUTPATIENT
Start: 2017-02-19 | End: 2017-02-19 | Stop reason: HOSPADM

## 2017-02-19 RX ORDER — HYDROCODONE BITARTRATE AND ACETAMINOPHEN 5; 325 MG/1; MG/1
2 TABLET ORAL AS NEEDED
Status: DISCONTINUED | OUTPATIENT
Start: 2017-02-19 | End: 2017-02-19 | Stop reason: HOSPADM

## 2017-02-19 RX ORDER — MIDAZOLAM HYDROCHLORIDE 1 MG/ML
INJECTION INTRAMUSCULAR; INTRAVENOUS AS NEEDED
Status: DISCONTINUED | OUTPATIENT
Start: 2017-02-19 | End: 2017-02-19 | Stop reason: SURG

## 2017-02-19 RX ORDER — NALOXONE HYDROCHLORIDE 0.4 MG/ML
80 INJECTION, SOLUTION INTRAMUSCULAR; INTRAVENOUS; SUBCUTANEOUS AS NEEDED
Status: DISCONTINUED | OUTPATIENT
Start: 2017-02-19 | End: 2017-02-19 | Stop reason: HOSPADM

## 2017-02-19 RX ORDER — MORPHINE SULFATE 2 MG/ML
2 INJECTION, SOLUTION INTRAMUSCULAR; INTRAVENOUS EVERY 10 MIN PRN
Status: DISCONTINUED | OUTPATIENT
Start: 2017-02-19 | End: 2017-02-19 | Stop reason: HOSPADM

## 2017-02-19 RX ORDER — MORPHINE SULFATE 4 MG/ML
4 INJECTION, SOLUTION INTRAMUSCULAR; INTRAVENOUS EVERY 10 MIN PRN
Status: DISCONTINUED | OUTPATIENT
Start: 2017-02-19 | End: 2017-02-19 | Stop reason: HOSPADM

## 2017-02-19 RX ORDER — POTASSIUM CHLORIDE 20 MEQ/1
40 TABLET, EXTENDED RELEASE ORAL ONCE
Status: COMPLETED | OUTPATIENT
Start: 2017-02-19 | End: 2017-02-19

## 2017-02-19 RX ORDER — HYDROMORPHONE HYDROCHLORIDE 1 MG/ML
0.4 INJECTION, SOLUTION INTRAMUSCULAR; INTRAVENOUS; SUBCUTANEOUS EVERY 5 MIN PRN
Status: DISCONTINUED | OUTPATIENT
Start: 2017-02-19 | End: 2017-02-19 | Stop reason: HOSPADM

## 2017-02-19 RX ORDER — 0.9 % SODIUM CHLORIDE 0.9 %
VIAL (ML) INJECTION
Status: COMPLETED
Start: 2017-02-19 | End: 2017-02-19

## 2017-02-19 RX ORDER — ONDANSETRON 2 MG/ML
4 INJECTION INTRAMUSCULAR; INTRAVENOUS ONCE AS NEEDED
Status: DISCONTINUED | OUTPATIENT
Start: 2017-02-19 | End: 2017-02-19 | Stop reason: HOSPADM

## 2017-02-19 RX ORDER — MORPHINE SULFATE 10 MG/ML
6 INJECTION, SOLUTION INTRAMUSCULAR; INTRAVENOUS EVERY 10 MIN PRN
Status: DISCONTINUED | OUTPATIENT
Start: 2017-02-19 | End: 2017-02-19 | Stop reason: HOSPADM

## 2017-02-19 RX ADMIN — PHENYLEPHRINE HCL 200 MCG: 10 MG/ML VIAL (ML) INJECTION at 09:28:00

## 2017-02-19 RX ADMIN — SODIUM CHLORIDE: 9 INJECTION, SOLUTION INTRAVENOUS at 09:56:00

## 2017-02-19 RX ADMIN — PHENYLEPHRINE HCL 200 MCG: 10 MG/ML VIAL (ML) INJECTION at 09:30:00

## 2017-02-19 RX ADMIN — MIDAZOLAM HYDROCHLORIDE 2 MG: 1 INJECTION INTRAMUSCULAR; INTRAVENOUS at 08:29:00

## 2017-02-19 RX ADMIN — LIDOCAINE HYDROCHLORIDE 50 MG: 10 INJECTION, SOLUTION EPIDURAL; INFILTRATION; INTRACAUDAL; PERINEURAL at 08:34:00

## 2017-02-19 RX ADMIN — SODIUM CHLORIDE: 9 INJECTION, SOLUTION INTRAVENOUS at 08:29:00

## 2017-02-19 NOTE — ANESTHESIA POSTPROCEDURE EVALUATION
Patient:  [facility]    Procedure Summary     Date Anesthesia Start Anesthesia Stop Room / Location    02/19/17 0829 0957 300 [facility] MAIN OR 04 / 300 [facility] MAIN OR       Procedure Diagnosis Surgeon Responsible Provider    HIP IRRIGATION AND DERBRIDEMENT (Right Hi

## 2017-02-19 NOTE — SPIRITUAL CARE NOTE
SD    Pt. Speaks only Frisian. Chp. Could not follow along all that he tried to express. Chp. Did understand him a little bit and prayed for him.

## 2017-02-19 NOTE — ANESTHESIA PREPROCEDURE EVALUATION
Anesthesia PreOp Note    HPI:     Erenest Query is a 45year old male who presents for preoperative consultation requested by: Edward Leone MD    Date of Surgery: 2/15/2017 - 2/19/2017    Procedure(s):  HIP IRRIGATION AND DERBRIDEMENT  Indication: Hi HYDROcodone-acetaminophen (NORCO)  MG per tab 1 tablet 1 tablet Oral Q4H PRN July Salazar MD 1 tablet at 02/18/17 1518   HYDROmorphone HCl PF (DILAUDID) 1 MG/ML injection 0.5 mg 0.5 mg Intravenous Q2H PRN July Salazar MD    Or        Sharron Figueroa on file    Drug Use: Not on file    Sexual Activity: Not on file   Not on file  Other Topics Concern   None on file     Social History Narrative       Available pre-op labs reviewed.     Lab Results  Component Value Date   WBC 3.5* 02/19/2017   RBC 4.30* 02 the anesthetic plan, benefits, risks, major complications, and any alternative forms of anesthetic management. All of the patient's questions were answered to the best of my ability. The patient desires the anesthetic management as planned.   ZULEYMA PUENTE

## 2017-02-19 NOTE — PROGRESS NOTES
Plan:  Check cultures.   Check CBC     LOng term IV antibiotics and followup with me call for appointment    Will need definitive treatment with Hip Reconstructive Surgeon    I spoke with Dr. Harsha Rebollar MD hip specialist and he will see patient as routine

## 2017-02-19 NOTE — PHYSICAL THERAPY NOTE
Attempted to see patient for physical therapy session, patient at surgery for I and D of hip. Will need new physical therapy orders and weight bearing status from ortho MD after surgery when appropriate. GRAEME paulson.

## 2017-02-19 NOTE — PLAN OF CARE
PAIN - ADULT    • Verbalizes/displays adequate comfort level or patient's stated pain goal Efraín Galicia states that due to his arthritis, his pain level is always a 20      Altered Communication/Language Barrier    • Patient/Family is able to un

## 2017-02-19 NOTE — BRIEF OP NOTE
[facility] OPERATING ROOM  Brief Op Note     Bess Butler Location: OR   [facility] [id number] MRN [id number]   Admission Date 2/15/2017 Operation Date 2/19/2017   Attending Physician Patricio Pruett MD Operating Physician Sera Whitmore MD       Pre-Operati

## 2017-02-19 NOTE — PROGRESS NOTES
[facility]    Progress Note    Colin Crane Patient Status:  Observation    3/31/1965 MRN [id number]   Location [facility] 4W/SW/SE Attending Richie Ayala MD   Hosp Day # 4 PCP No primary care provider on file.      Subjective: --                Medications:  • Potassium Chloride ER  40 mEq Oral Once   • [MAR Hold] metoprolol succinate  12.5 mg Oral 2x Daily(Beta Blocker)   • fluconazole  200 mg Oral Daily   • cefTRIAXone  1 g Intravenous Q24H   • [MAR Hold] Heparin Sodium (Por

## 2017-02-19 NOTE — PHYSICAL THERAPY NOTE
Attempted to see patient for physical therapy evaluation. Patient states he is too much pain and wants to sleep. Patient encouraged to sit in bedside chair but patient continued to decline. Rates pain in his hip at 14.  Will attempt to see patient tomorrow

## 2017-02-19 NOTE — BRIEF OP NOTE
[facility] OPERATING ROOM  Brief Op Note     [facility] Location: OR   [facility] [id number] N [id number]   Admission Date 2/15/2017 Operation Date 2/19/2017   Attending Physician Tiara Fall MD Operating Physician Nora Cardenas MD       Pre-Operati

## 2017-02-20 LAB
ALBUMIN SERPL BCP-MCNC: 1.8 G/DL (ref 3.5–4.8)
ALBUMIN/GLOB SERPL: 0.5 {RATIO} (ref 1–2)
ALP SERPL-CCNC: 49 U/L (ref 32–100)
ALT SERPL-CCNC: 43 U/L (ref 17–63)
ANION GAP SERPL CALC-SCNC: 3 MMOL/L (ref 0–18)
APTT PPP: 32.4 SECONDS (ref 23.2–35.3)
AST SERPL-CCNC: 60 U/L (ref 15–41)
BASOPHILS # BLD: 0 K/UL (ref 0–0.2)
BASOPHILS NFR BLD: 0 %
BILIRUB SERPL-MCNC: 0.8 MG/DL (ref 0.3–1.2)
BUN SERPL-MCNC: 22 MG/DL (ref 8–20)
BUN/CREAT SERPL: 20.4 (ref 10–20)
CALCIUM SERPL-MCNC: 7.3 MG/DL (ref 8.5–10.5)
CHLORIDE SERPL-SCNC: 101 MMOL/L (ref 95–110)
CO2 SERPL-SCNC: 24 MMOL/L (ref 22–32)
CREAT SERPL-MCNC: 1.08 MG/DL (ref 0.5–1.5)
EOSINOPHIL # BLD: 0 K/UL (ref 0–0.7)
EOSINOPHIL NFR BLD: 1 %
ERYTHROCYTE [DISTWIDTH] IN BLOOD BY AUTOMATED COUNT: 14.9 % (ref 11–15)
GLOBULIN PLAS-MCNC: 4 G/DL (ref 2.5–3.7)
GLUCOSE BLDC GLUCOMTR-MCNC: 105 MG/DL (ref 70–99)
GLUCOSE BLDC GLUCOMTR-MCNC: 137 MG/DL (ref 70–99)
GLUCOSE BLDC GLUCOMTR-MCNC: 148 MG/DL (ref 70–99)
GLUCOSE BLDC GLUCOMTR-MCNC: 98 MG/DL (ref 70–99)
GLUCOSE SERPL-MCNC: 133 MG/DL (ref 70–99)
HCT VFR BLD AUTO: 25.2 % (ref 41–52)
HGB BLD-MCNC: 8.5 G/DL (ref 13.5–17.5)
INR BLD: 1.3 (ref 0.9–1.2)
LYMPHOCYTES # BLD: 0.3 K/UL (ref 1–4)
LYMPHOCYTES NFR BLD: 5 %
MCH RBC QN AUTO: 28.5 PG (ref 27–32)
MCHC RBC AUTO-ENTMCNC: 33.9 G/DL (ref 32–37)
MCV RBC AUTO: 84.2 FL (ref 80–100)
MONOCYTES # BLD: 0.6 K/UL (ref 0–1)
MONOCYTES NFR BLD: 12 %
NEUTROPHILS # BLD AUTO: 3.8 K/UL (ref 1.8–7.7)
NEUTROPHILS NFR BLD: 82 %
OSMOLALITY UR CALC.SUM OF ELEC: 271 MOSM/KG (ref 275–295)
PLATELET # BLD AUTO: 183 K/UL (ref 140–400)
PMV BLD AUTO: 7.5 FL (ref 7.4–10.3)
POTASSIUM SERPL-SCNC: 4.4 MMOL/L (ref 3.3–5.1)
POTASSIUM SERPL-SCNC: 4.4 MMOL/L (ref 3.3–5.1)
PROT SERPL-MCNC: 5.8 G/DL (ref 5.9–8.4)
PROTHROMBIN TIME: 15.3 SECONDS (ref 11.8–14.5)
RBC # BLD AUTO: 2.99 M/UL (ref 4.5–5.9)
SODIUM SERPL-SCNC: 128 MMOL/L (ref 136–144)
WBC # BLD AUTO: 4.7 K/UL (ref 4–11)

## 2017-02-20 PROCEDURE — 99233 SBSQ HOSP IP/OBS HIGH 50: CPT | Performed by: HOSPITALIST

## 2017-02-20 RX ORDER — 0.9 % SODIUM CHLORIDE 0.9 %
VIAL (ML) INJECTION
Status: COMPLETED
Start: 2017-02-20 | End: 2017-02-20

## 2017-02-20 RX ORDER — SODIUM CHLORIDE 9 MG/ML
INJECTION, SOLUTION INTRAVENOUS CONTINUOUS
Status: DISCONTINUED | OUTPATIENT
Start: 2017-02-20 | End: 2017-02-23

## 2017-02-20 NOTE — PAYOR COMM NOTE
[facility] #[id number]    Admission Info: Inpatient (Adm: 02/15/17)   Hospital Account: [de-identified]    Description: 45year old M   Primary Service: Medical   Unit Info: Denise Johnson [address] [facility]             Admission Orders        ADMIT TO INPATIENT [[id number]]

## 2017-02-20 NOTE — PROGRESS NOTES
INFECTIOUS DISEASE PROGRESS NOTE    Colin Crane Patient Status:  Observation    3/31/1965 MRN [id number]   Location [facility] 4W/SW/SE Attending Richie Ayala MD   Hosp Day # 5 PCP No primary care provider on file.      Subjective:  Patient se OA, no fracture.  CT R hip w/o fracture but severe rheumatoid arthritis with erosions and large complex R hip effusion. CT R knee with no osseous injury with mild tricomprtmental OA. Trace effusion.  Seen by IR 2/15 and s/p US R hip aspiration with 3 mL pur

## 2017-02-20 NOTE — PROGRESS NOTES
[facility]    Progress Note    Kareem Shafer Patient Status:  Observation    3/31/1965 MRN [id number]   Location [facility] 4W/SW/SE Attending Mignon Wang MD   Hosp Day # 5 PCP No primary care provider on file.      Subjective: --    --                Medications:  • metoprolol succinate  12.5 mg Oral 2x Daily(Beta Blocker)   • fluconazole  200 mg Oral Daily   • cefTRIAXone  1 g Intravenous Q24H   • insulin aspart  1-5 Units Subcutaneous TID CC   • mupirocin  1 Application Each N

## 2017-02-20 NOTE — PAYOR COMM NOTE
Progress Notes by Leonela Martinez MD at 2/15/2017  1:49 PM      Author: Leonela Martinez MD Service: (none) Author Type: Physician     Filed: 2/15/2017  2:00 PM Note Time: 2/15/2017  1:49 PM Status: Signed     : Leonela Martinez MD (Physician)       ADDENDUM: please no distress  HEENT:  Normocephalic, atraumatic  Neck:  Supple, symmetrical  Cardiac:  Regular rate, regular rhythm  Pulmonary:  Clear to auscultation bilaterally, respirations unlabored  Gastrointestinal:  Soft, non-tender, normal bowel sounds  Musculoskel (cpt=73700)    2/15/2017  CONCLUSION:         1. No acute osseous injury of the right knee is apparent.  2. Mild tricompartmental osteoarthritis of the right knee, worst in the medial compartment.  3. Trace right knee joint effusion.  Small right Baker's cy Notes by Cass Arce MD at 2/16/2017  2:23 PM      Author: Cass Arce MD Service: (none) Author Type: Physician     Filed: 2/16/2017  2:45 PM Note Time: 2/16/2017  2:23 PM Status: Signed     : Cass Arce MD (Physician)       patient did not take. Influenza negative here. XRay of R knee with joint effusion, OA, no fracture.  CT R hip w/o fracture but severe rheumatoid arthritis with erosions and large complex R hip effusion.  CT R knee with no osseous injury with mild tricomprtm No primary care provider on file.      Subjective:  C/o R groin pain. Sore joints in arms and hands.     ROS: 10 point ROS completed and was negative except as stated above    Objective:  Temp:  [98.6 °F (37 °C)-101.8 °F (38.8 °C)] 98.6 °F (37 °C)  Pulse:  findings. 2. Emphysema with pulmonary fibrosis.  3. Right upper lobe nodule.              Medications:  •  sodium chloride           •  metoprolol succinate   12.5 mg  Oral  2x Daily(Beta Blocker)    •  cefTRIAXone   1 g  Intravenous  Q24H    •  Heparin Sod complications and for persistance of infections.  High risk for morbidity and mortality    All options discussed with patient  Discussed with doctor Macie and with doctor Nazanin Vidal.                 Progress Notes by Renate Ordonez DO at 2/20/2017 11:27 AM Hip joint effusion, right      Antibiotics:  ceftriaxone      ASSESSMENT:  44 yo Korean speaking male with history obtained via  with a h/o HIV with apparent poor compliance on Reyatax, norvir, truvada, Bactrim, arthritis admitted on 2/ Alice Mccray MD Service: (none) Author Type: Physician     Filed: 2/19/2017 10:12 AM Note Time: 2/19/2017 10:11 AM Status: Signed     : Hanane Porter MD (Physician)       Alexandra 44  Brief Op Note     Emma Gray Location: OR

## 2017-02-20 NOTE — PROGRESS NOTES
Patient stable and not in any pain. Neurovascularly unchanged and hip pain is much better than it was preoperatively. Dressing clean and dry and vital stable. and normal    Dressing clean and dry.   Residual swelling to right lower extremity still presen

## 2017-02-20 NOTE — PHYSICAL THERAPY NOTE
PHYSICAL THERAPY TREATMENT NOTE - INPATIENT    Room Number: 683/102-J       Presenting Problem: pt admitted on 2/15/17 with multiple falls and right hip pain   pt is s/p  right hip aspiration on 2/15    2/17 US guided  right hip arthrocentesis    2/19/17 right LE In stand---  SPT  With RW  Chair to bed min assist of  2 . Pt unable to ambulate  Pivot transfers from left side only tolerated. PT will continue to follow progressing as approp. D./c plans pending progress and acute management.      Kev (e.g., wheelchair, bedside commode, etc.): A Lot   -   Moving from lying on back to sitting on the side of the bed?: A Lot   How much help from another person does the patient currently need. ..   -   Moving to and from a bed to a chair (including a wheelch staff;Call light within reach;RN aware of session/findings; All patient questions and concerns addressed; Alarm set    CURRENT GOALS        Goal #1  Patient is able to demonstrate supine - sit EOB @ level: independent   current sit to supine  2 assist used f

## 2017-02-20 NOTE — PHYSICAL THERAPY NOTE
PHYSICAL THERAPY   RE-  EVALUATION - INPATIENT     Room Number: 792/374-L  Evaluation Date: 2/16/2017  Type of Evaluation:  RE EVAL     Physician Order: PT Eval and Treat    Presenting Problem: pt admitted on 2/15/17 with multiple falls and right hip chen SUBJECTIVE---pt is Tamazight speaking   Language line used   Interpretor  Lore      Pt with pain  At rest  Primarily right foot   Some right hip     Also pain bilateral hands shoulderss  Making use of RW difficulty also c/o about IV   RN notified Lot   How much help from another person does the patient currently need. ..   -   Moving to and from a bed to a chair (including a wheelchair)?: A Lot   -   Need to walk in hospital room?: Total   -   Climbing 3-5 steps with a railing?: Total       AM-PAC S 2/19/17  I and D .           In this PT evaluation, the patient presents with the following impairments: pain/ septic arthritis     Decrease AROM and str right LE      Poor tolerance for mobility     Decrease balance   Need for assisted mobility   Increased

## 2017-02-21 ENCOUNTER — APPOINTMENT (OUTPATIENT)
Dept: INTERVENTIONAL RADIOLOGY/VASCULAR | Facility: HOSPITAL | Age: 52
DRG: 854 | End: 2017-02-21
Attending: HOSPITALIST
Payer: COMMERCIAL

## 2017-02-21 ENCOUNTER — APPOINTMENT (OUTPATIENT)
Dept: ULTRASOUND IMAGING | Facility: HOSPITAL | Age: 52
DRG: 854 | End: 2017-02-21
Attending: HOSPITALIST
Payer: COMMERCIAL

## 2017-02-21 PROBLEM — N48.89 PENILE EDEMA: Status: ACTIVE | Noted: 2017-02-21

## 2017-02-21 LAB
ANION GAP SERPL CALC-SCNC: 3 MMOL/L (ref 0–18)
ANTIBODY SCREEN: NEGATIVE
BACTERIA UR QL AUTO: NEGATIVE /HPF
BASOPHILS # BLD: 0 K/UL (ref 0–0.2)
BASOPHILS NFR BLD: 1 %
BILIRUB UR QL: NEGATIVE
BUN SERPL-MCNC: 20 MG/DL (ref 8–20)
BUN/CREAT SERPL: 20.2 (ref 10–20)
CALCIUM SERPL-MCNC: 7.2 MG/DL (ref 8.5–10.5)
CHLORIDE SERPL-SCNC: 99 MMOL/L (ref 95–110)
CLARITY UR: CLEAR
CO2 SERPL-SCNC: 27 MMOL/L (ref 22–32)
COLOR UR: YELLOW
CREAT SERPL-MCNC: 0.99 MG/DL (ref 0.5–1.5)
EOSINOPHIL # BLD: 0.1 K/UL (ref 0–0.7)
EOSINOPHIL NFR BLD: 2 %
ERYTHROCYTE [DISTWIDTH] IN BLOOD BY AUTOMATED COUNT: 15 % (ref 11–15)
GLUCOSE BLDC GLUCOMTR-MCNC: 119 MG/DL (ref 70–99)
GLUCOSE BLDC GLUCOMTR-MCNC: 134 MG/DL (ref 70–99)
GLUCOSE BLDC GLUCOMTR-MCNC: 138 MG/DL (ref 70–99)
GLUCOSE BLDC GLUCOMTR-MCNC: 93 MG/DL (ref 70–99)
GLUCOSE SERPL-MCNC: 124 MG/DL (ref 70–99)
GLUCOSE UR-MCNC: NEGATIVE MG/DL
HCT VFR BLD AUTO: 21.4 % (ref 41–52)
HGB BLD-MCNC: 7.2 G/DL (ref 13.5–17.5)
HGB BLD-MCNC: 7.3 G/DL (ref 13.5–17.5)
IRON SERPL-MCNC: <5 MCG/DL (ref 45–182)
KETONES UR-MCNC: NEGATIVE MG/DL
LEUKOCYTE ESTERASE UR QL STRIP.AUTO: NEGATIVE
LYMPHOCYTES # BLD: 0.2 K/UL (ref 1–4)
LYMPHOCYTES NFR BLD: 6 %
MCH RBC QN AUTO: 28.2 PG (ref 27–32)
MCHC RBC AUTO-ENTMCNC: 33.5 G/DL (ref 32–37)
MCV RBC AUTO: 84.3 FL (ref 80–100)
MONOCYTES # BLD: 0.3 K/UL (ref 0–1)
MONOCYTES NFR BLD: 13 %
NEUTROPHILS # BLD AUTO: 2.1 K/UL (ref 1.8–7.7)
NEUTROPHILS NFR BLD: 78 %
NITRITE UR QL STRIP.AUTO: NEGATIVE
OSMOLALITY UR CALC.SUM OF ELEC: 272 MOSM/KG (ref 275–295)
PH UR: 6 [PH] (ref 5–8)
PLATELET # BLD AUTO: 184 K/UL (ref 140–400)
PMV BLD AUTO: 7.6 FL (ref 7.4–10.3)
POTASSIUM SERPL-SCNC: 4.1 MMOL/L (ref 3.3–5.1)
PROT UR-MCNC: NEGATIVE MG/DL
RBC # BLD AUTO: 2.53 M/UL (ref 4.5–5.9)
RBC #/AREA URNS AUTO: 5 /HPF
RH BLOOD TYPE: POSITIVE
SODIUM SERPL-SCNC: 129 MMOL/L (ref 136–144)
SP GR UR STRIP: 1.02 (ref 1–1.03)
TIBC SERPL-MCNC: 145 MCG/DL (ref 228–428)
TRANSFERRIN SERPL-MCNC: 110 MG/DL (ref 180–329)
UROBILINOGEN UR STRIP-ACNC: 2
VIT C UR-MCNC: NEGATIVE MG/DL
WBC # BLD AUTO: 2.7 K/UL (ref 4–11)
WBC #/AREA URNS AUTO: 1 /HPF

## 2017-02-21 PROCEDURE — 06H03DZ INSERTION OF INTRALUMINAL DEVICE INTO INFERIOR VENA CAVA, PERCUTANEOUS APPROACH: ICD-10-PCS | Performed by: RADIOLOGY

## 2017-02-21 PROCEDURE — 99233 SBSQ HOSP IP/OBS HIGH 50: CPT | Performed by: HOSPITALIST

## 2017-02-21 PROCEDURE — 93971 EXTREMITY STUDY: CPT

## 2017-02-21 PROCEDURE — 30233N1 TRANSFUSION OF NONAUTOLOGOUS RED BLOOD CELLS INTO PERIPHERAL VEIN, PERCUTANEOUS APPROACH: ICD-10-PCS | Performed by: HOSPITALIST

## 2017-02-21 PROCEDURE — 99223 1ST HOSP IP/OBS HIGH 75: CPT | Performed by: UROLOGY

## 2017-02-21 RX ORDER — LIDOCAINE HYDROCHLORIDE 20 MG/ML
INJECTION, SOLUTION EPIDURAL; INFILTRATION; INTRACAUDAL; PERINEURAL
Status: COMPLETED
Start: 2017-02-21 | End: 2017-02-21

## 2017-02-21 RX ORDER — SODIUM CHLORIDE 0.9 % (FLUSH) 0.9 %
10 SYRINGE (ML) INJECTION AS NEEDED
Status: DISCONTINUED | OUTPATIENT
Start: 2017-02-21 | End: 2017-02-21

## 2017-02-21 RX ORDER — MORPHINE SULFATE 4 MG/ML
INJECTION, SOLUTION INTRAMUSCULAR; INTRAVENOUS
Status: COMPLETED
Start: 2017-02-21 | End: 2017-02-21

## 2017-02-21 RX ORDER — SODIUM CHLORIDE 9 MG/ML
INJECTION, SOLUTION INTRAVENOUS
Status: COMPLETED
Start: 2017-02-21 | End: 2017-02-21

## 2017-02-21 RX ORDER — MIDAZOLAM HYDROCHLORIDE 1 MG/ML
INJECTION INTRAMUSCULAR; INTRAVENOUS
Status: COMPLETED
Start: 2017-02-21 | End: 2017-02-21

## 2017-02-21 RX ORDER — MORPHINE SULFATE 4 MG/ML
4 INJECTION, SOLUTION INTRAMUSCULAR; INTRAVENOUS ONCE
Status: COMPLETED | OUTPATIENT
Start: 2017-02-21 | End: 2017-02-21

## 2017-02-21 RX ORDER — MELATONIN
325 2 TIMES DAILY WITH MEALS
Status: DISCONTINUED | OUTPATIENT
Start: 2017-02-21 | End: 2017-02-27

## 2017-02-21 RX ORDER — LIDOCAINE HYDROCHLORIDE 10 MG/ML
0.5 INJECTION, SOLUTION INFILTRATION; PERINEURAL ONCE AS NEEDED
Status: ACTIVE | OUTPATIENT
Start: 2017-02-21 | End: 2017-02-21

## 2017-02-21 RX ORDER — OXYCODONE AND ACETAMINOPHEN 10; 325 MG/1; MG/1
1 TABLET ORAL EVERY 4 HOURS PRN
Status: DISCONTINUED | OUTPATIENT
Start: 2017-02-21 | End: 2017-02-27

## 2017-02-21 RX ORDER — SODIUM CHLORIDE 9 MG/ML
INJECTION, SOLUTION INTRAVENOUS ONCE
Status: COMPLETED | OUTPATIENT
Start: 2017-02-21 | End: 2017-02-21

## 2017-02-21 RX ORDER — HYDROMORPHONE HYDROCHLORIDE 1 MG/ML
1 INJECTION, SOLUTION INTRAMUSCULAR; INTRAVENOUS; SUBCUTANEOUS EVERY 4 HOURS PRN
Status: DISCONTINUED | OUTPATIENT
Start: 2017-02-21 | End: 2017-02-27

## 2017-02-21 RX ORDER — MIDAZOLAM HYDROCHLORIDE 1 MG/ML
1 INJECTION INTRAMUSCULAR; INTRAVENOUS EVERY 5 MIN PRN
Status: DISCONTINUED | OUTPATIENT
Start: 2017-02-21 | End: 2017-02-21

## 2017-02-21 RX ORDER — SODIUM CHLORIDE 0.9 % (FLUSH) 0.9 %
10 SYRINGE (ML) INJECTION AS NEEDED
Status: DISCONTINUED | OUTPATIENT
Start: 2017-02-21 | End: 2017-02-27

## 2017-02-21 NOTE — PROGRESS NOTES
[facility]  Inpatient Pain Management Progress Note      Patient name: Eufemia Rey 45year old male  : 3/31/1965  MRN: [id number]    Diagnosis: post operative pain    Reason for Consult: postoperative pain    Current hospital day:

## 2017-02-21 NOTE — PLAN OF CARE
MUSCULOSKELETAL - ADULT    • Maintain proper alignment of affected body part Progressing        PAIN - ADULT    • Verbalizes/displays adequate comfort level or patient's stated pain goal Progressing        Patient/Family Goals    • Patient/Family Long Term

## 2017-02-21 NOTE — PROGRESS NOTES
INFECTIOUS DISEASE PROGRESS NOTE    Sherwinmachelle Rodriguezdayanara Patient Status:  Observation    3/31/1965 MRN [id number]   Location [facility] 4W/SW/SE Attending Julio C Moore MD   Hosp Day # 6 PCP No primary care provider on file.      Subjective:  Patient se rheumatoid arthritis with erosions and large complex R hip effusion. CT R knee with no osseous injury with mild tricomprtmental OA. Trace effusion. Seen by IR 2/15 and s/p US R hip aspiration with 3 mL purulent fluid removed.      # R hip septic arthritis s

## 2017-02-21 NOTE — CM/SW NOTE
ASTRID spoke with Dr. Luna James regarding d/c plan for IVABX. Patient will need 6 weeks IV Rocephin 1G q 24 hours.   Currently patient has a peripheral IV; PICC to be discuss prior to d/c.  DX: Advanced Arthritis w/ Joint Effusion, HIV - poor compliance with o

## 2017-02-21 NOTE — DIETARY NOTE
ADULT NUTRITION INITIAL ASSESSMENT    Pt is at moderate nutrition risk. Pt does not meet malnutrition criteria.       RECOMMENDATIONS TO MD:  Recommendations to MD: none at this time     NUTRITION DIAGNOSIS/PROBLEM:  Inadequate oral intake related to decre Accumulation: swelling in leg--clot and post op fluid gains per visual exam  .  - Skin Integrity: surgical wounds and RN documentation reviewed.   - Drake score 14    NUTRITION PRESCRIPTION:  Diet: Carbohydrate controlled diet 1800 kcal/60 grams CHO per me

## 2017-02-21 NOTE — PROGRESS NOTES
[facility]    Progress Note    Arleen Early Patient Status:  Inpatient    3/31/1965 MRN [id number]   Location [facility] 4W/SW/SE Attending Junior Disla MD   Hosp Day # 6 PCP No primary care provider on file.      Subjective:  R INR   --    --   1.3*   --        Us Venous Doppler Leg Right - Diag Img ([id number])    2/21/2017  CONCLUSION:  1. Acute occlusive deep venous thrombosis in the right common femoral vein, extending inferior throughout the femoral vein to the knee.  2. Occ monitor  - [email or web address]    Hypotension  - 2/2 hypovolemia and anemia    DVT proph: heparin sq        Jesusita Landers MD

## 2017-02-21 NOTE — CONSULTS
[facility] AND [facility]  Male Inpatient Consult    Erenest Query Patient Status:  Inpatient    3/31/1965 MRN [id number]   Location [facility] 4W/SW/SE Attending Fela Martin. [address] Day # 6 PCP No primary care provider on file.      DATE OF ADMISSI heart failure or hypercholesterolemia. No history of valvular heart disease, arrhythmias, rheumatic fever, dvt or PE, chest pain, shortness of breath or ankle swelling. The patient exercises regularly.   4.   The patient denies pulmonary complaints of cou Intramuscular Prior to discharge   Pneumococcal Vac Polyvalent (PNEUMOVAX) injection 0.5 mL 0.5 mL Subcutaneous Prior to discharge   Umeclidinium Bromide (INCRUSE ELLIPTA) 62.5 MCG/INH inhaler 1 puff 1 puff Inhalation Daily   Cyclobenzaprine HCl (cyclobenz developed. Testicles are descended bilaterally and normal in size, position and consistency. Epididymis, vas and cord structures are intact. There are no hernias bilaterally.   Again there is marked penile edema along with dependent scrotal edema without and malnourishment     PLAN:     I do agree with present treatment of elevating scrotum and penis with towels patient's protein can be replaced with better nourishment while hospitalized but I see no acute urological difficulty that needs intervention.   Wi

## 2017-02-21 NOTE — PROGRESS NOTES
Patient stable sp placement of picc line and ivc filter by radiology    Right thigh swollen. Wound clean and dry without evidence of drainage or infection. Drain with minimal output 25cc over last two shifts and less.   Since this am dropps of serosanguin

## 2017-02-21 NOTE — PHYSICAL THERAPY NOTE
Per RN pt is on bed rest today d/t low 7.2 hgb and blood cloths. Pt is schedule to filter placement and blood transfusion later today.

## 2017-02-21 NOTE — PROCEDURES
[facility]  Procedure Note    Denilson Silva Patient Status:  Inpatient    3/31/1965 MRN [id number]   Location [facility] Attending Jose A Smith MD   Hosp Day # 6 PCP No primary care provider on file.      Pr

## 2017-02-21 NOTE — BH PROGRESS NOTE
S  Called to see patient b/o penile swelling. The patient states that the swelling is actually better today than it was yesterday. He denies any associated pain or discharge. He has never had this before. There is no itching.   He has no trouble passin

## 2017-02-22 LAB
ANION GAP SERPL CALC-SCNC: 3 MMOL/L (ref 0–18)
APTT PPP: 31.8 SECONDS (ref 23.2–35.3)
BASOPHILS # BLD: 0 K/UL (ref 0–0.2)
BASOPHILS NFR BLD: 1 %
BUN SERPL-MCNC: 17 MG/DL (ref 8–20)
BUN/CREAT SERPL: 18.9 (ref 10–20)
CALCIUM SERPL-MCNC: 7 MG/DL (ref 8.5–10.5)
CHLORIDE SERPL-SCNC: 102 MMOL/L (ref 95–110)
CO2 SERPL-SCNC: 24 MMOL/L (ref 22–32)
CREAT SERPL-MCNC: 0.9 MG/DL (ref 0.5–1.5)
EOSINOPHIL # BLD: 0.1 K/UL (ref 0–0.7)
EOSINOPHIL NFR BLD: 2 %
ERYTHROCYTE [DISTWIDTH] IN BLOOD BY AUTOMATED COUNT: 16.3 % (ref 11–15)
FOLATE SERPL-MCNC: 17 NG/ML
GLUCOSE BLDC GLUCOMTR-MCNC: 105 MG/DL (ref 70–99)
GLUCOSE BLDC GLUCOMTR-MCNC: 82 MG/DL (ref 70–99)
GLUCOSE BLDC GLUCOMTR-MCNC: 98 MG/DL (ref 70–99)
GLUCOSE BLDC GLUCOMTR-MCNC: 99 MG/DL (ref 70–99)
GLUCOSE SERPL-MCNC: 95 MG/DL (ref 70–99)
HAPTOGLOB SERPL-MCNC: 251 MG/DL (ref 16–200)
HCT VFR BLD AUTO: 22.8 % (ref 41–52)
HGB BLD-MCNC: 10.3 G/DL (ref 13.5–17.5)
HGB BLD-MCNC: 7.6 G/DL (ref 13.5–17.5)
INR BLD: 1.3 (ref 0.9–1.2)
LDH SERPL L TO P-CCNC: 210 U/L (ref 98–192)
LYMPHOCYTES # BLD: 0.2 K/UL (ref 1–4)
LYMPHOCYTES NFR BLD: 10 %
MCH RBC QN AUTO: 27.5 PG (ref 27–32)
MCHC RBC AUTO-ENTMCNC: 33.3 G/DL (ref 32–37)
MCV RBC AUTO: 82.7 FL (ref 80–100)
MONOCYTES # BLD: 0.3 K/UL (ref 0–1)
MONOCYTES NFR BLD: 11 %
NEUTROPHILS # BLD AUTO: 1.9 K/UL (ref 1.8–7.7)
NEUTROPHILS NFR BLD: 77 %
OSMOLALITY UR CALC.SUM OF ELEC: 269 MOSM/KG (ref 275–295)
PLATELET # BLD AUTO: 171 K/UL (ref 140–400)
PMV BLD AUTO: 7.4 FL (ref 7.4–10.3)
POTASSIUM SERPL-SCNC: 4.3 MMOL/L (ref 3.3–5.1)
PROTHROMBIN TIME: 15.4 SECONDS (ref 11.8–14.5)
RBC # BLD AUTO: 2.75 M/UL (ref 4.5–5.9)
SODIUM SERPL-SCNC: 129 MMOL/L (ref 136–144)
VIT B12 SERPL-MCNC: 686 PG/ML (ref 181–914)
WBC # BLD AUTO: 2.5 K/UL (ref 4–11)

## 2017-02-22 PROCEDURE — 99223 1ST HOSP IP/OBS HIGH 75: CPT | Performed by: INTERNAL MEDICINE

## 2017-02-22 PROCEDURE — 99233 SBSQ HOSP IP/OBS HIGH 50: CPT | Performed by: HOSPITALIST

## 2017-02-22 PROCEDURE — 99232 SBSQ HOSP IP/OBS MODERATE 35: CPT | Performed by: UROLOGY

## 2017-02-22 RX ORDER — SODIUM CHLORIDE 9 MG/ML
INJECTION, SOLUTION INTRAVENOUS ONCE
Status: COMPLETED | OUTPATIENT
Start: 2017-02-22 | End: 2017-02-22

## 2017-02-22 RX ORDER — SODIUM CHLORIDE 9 MG/ML
INJECTION, SOLUTION INTRAVENOUS
Status: COMPLETED
Start: 2017-02-22 | End: 2017-02-22

## 2017-02-22 RX ORDER — PHYTONADIONE 10 MG/ML
5 INJECTION, EMULSION INTRAMUSCULAR; INTRAVENOUS; SUBCUTANEOUS ONCE
Status: DISCONTINUED | OUTPATIENT
Start: 2017-02-22 | End: 2017-02-22

## 2017-02-22 RX ORDER — SODIUM CHLORIDE 0.9 % (FLUSH) 0.9 %
10 SYRINGE (ML) INJECTION AS NEEDED
Status: DISCONTINUED | OUTPATIENT
Start: 2017-02-22 | End: 2017-02-27

## 2017-02-22 RX ORDER — ENOXAPARIN SODIUM 100 MG/ML
1 INJECTION SUBCUTANEOUS EVERY 12 HOURS SCHEDULED
Status: DISCONTINUED | OUTPATIENT
Start: 2017-02-22 | End: 2017-02-22

## 2017-02-22 NOTE — CONSULTS
Hematology Consultation Note    Patient Name: Kimberley Saleh   YOB: 1965   Medical Record Number: [id number]   CSN: [id number]   Consulting Physician: Perry Rueda MD  Referring Physician(s): Emiliano Almeida MD  Date of Consultation Surgical History    REMOVAL OF LUNG,LOBECTOMY      HERNIA SURGERY         Family Medical History:  Family History   Problem Relation Age of Onset   • Diabetes Father    • Hypertension Father    • Heart Disorder Father    • Diabetes Mother    • Heart Disord CefTRIAXone Sodium (ROCEPHIN) 1 g in sodium chloride 0.9 % 100 mL IVPB-minibag 1 g Intravenous Q24H Fernando Quijano MD Last Rate: 200 mL/hr at 02/22/17 1548 1 g at 02/22/17 1548   acetaminophen (TYLENOL) tab 650 mg 650 mg Oral Q6H PRN July Salazar mmHg  Pulse 101  Temp(Src) 100.8 °F (38.2 °C) (Oral)  Resp 18  Ht 1.803 m (5' 11\")  Wt 78.019 kg (172 lb)  BMI 24.00 kg/m2  SpO2 95%    Physical Examination:    General: Patient is alert and oriented x 3, not in acute distress.   Psych: Appropriate questio condition and is currently being treated for septic arthritis with multiple joint involvement, sepsis bacteremia due to strep pneumonia, blood loss related to recent surgery in the setting of hematoma, evidence of mild coagulopathy, and now has a new acute worsening his hemoglobin value    4.) Leukopenia without neutropenia    --Likely secondary to his HIV status, further worsened in the setting of sepsis which can decrease bone marrow function  --No indication patient would benefit from G-CSF booster as he

## 2017-02-22 NOTE — PROGRESS NOTES
[facility] Patient Status:  Inpatient    3/31/1965 MRN [id number]   Location [facility] 4W/SW/SE Attending Tonny Hoover. [address] Day # 7 PCP No primary care provider on file.        Kareem Shafer is a 45year old ma insulin aspart (NOVOLOG) 100 UNIT/ML flexpen 1-5 Units 1-5 Units Subcutaneous TID CC   influenza vaccine split quad (FLUARIX) ages 1 & older inj 0.5ml 0.5 mL Intramuscular Prior to discharge   Pneumococcal Vac Polyvalent (PNEUMOVAX) injection 0.5 mL 0.5 of HIV positive with patient noncompliant with antiviral medications at least in the past has significant anemia and poor protein state causing patient's penile and scrotal edema.   No evidence of infection or cellulitis being treated conservatively with el

## 2017-02-22 NOTE — PLAN OF CARE
Altered Communication/Language Barrier    • Patient/Family is able to understand and participate in their care Progressing    Pt's brother present today and translated. Language line and Persian speaking staff  used prn.      HEMATOLOGIC - ADULT    • Anabell Gonzalez

## 2017-02-22 NOTE — PROGRESS NOTES
INFECTIOUS DISEASE PROGRESS NOTE    Selwyn Majnao Patient Status:  Observation    3/31/1965 MRN [id number]   Location [facility] 4W/SW/SE Attending Moises Lucero MD   Hosp Day # 7 PCP No primary care provider on file.      Subjective:  Patient se erosions and large complex R hip effusion. CT R knee with no osseous injury with mild tricomprtmental OA. Trace effusion. Seen by IR 2/15 and s/p US R hip aspiration with 3 mL purulent fluid removed.      # R hip septic arthritis s/p aspiration 2/15/17 with

## 2017-02-22 NOTE — PHYSICAL THERAPY NOTE
Per RN Pt is still on bedrest d/t medical condition, may need blood transfusion today. Will follow with progress.

## 2017-02-22 NOTE — PROGRESS NOTES
[place] [facility]    Progress Note    Cherie Staples Patient Status:  Inpatient    3/31/1965 MRN [id number]   Location [facility] 4W/SW/SE Attending Nica Iglesias, [address] [facility]  Day # 7 PCP No primary care provider on file.        Subjec fluid and hematoma.   - ceftiaxone IV x 6 weeks per ID  - pt cannot have right hip replacement now due to infection  - percocet and dilaudid prn    Acute blood loss anemia from surgery and R hip hematoma  - drop in hgb  - has been transfused total 4 units p

## 2017-02-22 NOTE — PLAN OF CARE
Patient/Family Goals    • Patient/Family Long Term Goal Progressing    • Patient/Family Short Term Goal Progressing        SAFETY ADULT - FALL    • Free from fall injury Progressing        SKIN/TISSUE INTEGRITY - ADULT    • Skin integrity remains intact Pr

## 2017-02-22 NOTE — DISCHARGE PLANNING
MD order received regarding discharge planning. The pt. Is home with his wife. The pt. Is not doing well with PT and will also need IV abx at discharge. The pt's insurance is a public aid replacement and will be difficult to find placement.   Referral ha

## 2017-02-22 NOTE — PROGRESS NOTES
[facility]  Inpatient Pain Management Progress Note      Patient name: Marisela Cooley 45year old male  : 3/31/1965  MRN: [id number]    Diagnosis: postoperative pain    Reason for Consult: post op right hip and leg pain    Current hos

## 2017-02-22 NOTE — BH PROGRESS NOTE
Even after transfusion patient's hemoglobin is again 7.6. BP remains in the 90s  And heart rate >100 with activity. Will transfuse again. ? Ongoing bleeding? Have paged Dr. Haley Peralta to discuss awaiting call back.

## 2017-02-23 LAB
ANION GAP SERPL CALC-SCNC: 5 MMOL/L (ref 0–18)
APTT PPP: 32.2 SECONDS (ref 23.2–35.3)
BASOPHILS # BLD: 0 K/UL (ref 0–0.2)
BASOPHILS NFR BLD: 0 %
BLOOD TYPE BARCODE: 5100
BUN SERPL-MCNC: 13 MG/DL (ref 8–20)
BUN/CREAT SERPL: 14.4 (ref 10–20)
CALCIUM SERPL-MCNC: 7.5 MG/DL (ref 8.5–10.5)
CHLORIDE SERPL-SCNC: 97 MMOL/L (ref 95–110)
CO2 SERPL-SCNC: 25 MMOL/L (ref 22–32)
CREAT SERPL-MCNC: 0.9 MG/DL (ref 0.5–1.5)
EOSINOPHIL # BLD: 0 K/UL (ref 0–0.7)
EOSINOPHIL NFR BLD: 1 %
ERYTHROCYTE [DISTWIDTH] IN BLOOD BY AUTOMATED COUNT: 16.2 % (ref 11–15)
F2 C.20210G>A GENO BLD/T: NORMAL
F5 P.R506Q BLD/T QL: NORMAL
FIBRINOGEN PPP-MCNC: 432 MG/DL (ref 176–491)
GLUCOSE BLDC GLUCOMTR-MCNC: 109 MG/DL (ref 70–99)
GLUCOSE BLDC GLUCOMTR-MCNC: 118 MG/DL (ref 70–99)
GLUCOSE BLDC GLUCOMTR-MCNC: 124 MG/DL (ref 70–99)
GLUCOSE BLDC GLUCOMTR-MCNC: 132 MG/DL (ref 70–99)
GLUCOSE SERPL-MCNC: 95 MG/DL (ref 70–99)
HCT VFR BLD AUTO: 32.6 % (ref 41–52)
HCYS SERPL-SCNC: 12.2 UMOL/L
HGB BLD-MCNC: 11 G/DL (ref 13.5–17.5)
INR BLD: 1.2 (ref 0.9–1.2)
LYMPHOCYTES # BLD: 0.3 K/UL (ref 1–4)
LYMPHOCYTES NFR BLD: 8 %
MCH RBC QN AUTO: 28.1 PG (ref 27–32)
MCHC RBC AUTO-ENTMCNC: 33.7 G/DL (ref 32–37)
MCV RBC AUTO: 83.4 FL (ref 80–100)
MONOCYTES # BLD: 0.3 K/UL (ref 0–1)
MONOCYTES NFR BLD: 7 %
NEUTROPHILS # BLD AUTO: 2.9 K/UL (ref 1.8–7.7)
NEUTROPHILS NFR BLD: 83 %
OSMOLALITY UR CALC.SUM OF ELEC: 264 MOSM/KG (ref 275–295)
PLATELET # BLD AUTO: 201 K/UL (ref 140–400)
PMV BLD AUTO: 7.4 FL (ref 7.4–10.3)
POTASSIUM SERPL-SCNC: 4.2 MMOL/L (ref 3.3–5.1)
PROTHROMBIN TIME: 14.9 SECONDS (ref 11.8–14.5)
RBC # BLD AUTO: 3.91 M/UL (ref 4.5–5.9)
RETICS/RBC NFR AUTO: 1 % (ref 0.5–1.5)
SODIUM SERPL-SCNC: 127 MMOL/L (ref 136–144)
WBC # BLD AUTO: 3.5 K/UL (ref 4–11)

## 2017-02-23 PROCEDURE — 99232 SBSQ HOSP IP/OBS MODERATE 35: CPT | Performed by: UROLOGY

## 2017-02-23 PROCEDURE — 99233 SBSQ HOSP IP/OBS HIGH 50: CPT | Performed by: HOSPITALIST

## 2017-02-23 NOTE — PHYSICAL THERAPY NOTE
PHYSICAL THERAPY TREATMENT NOTE - INPATIENT    Room Number: 784/659-L       Presenting Problem: pt admitted on 2/15/17 with multiple falls and right hip pain   pt is s/p  right hip aspiration on 2/15    2/17 US guided  right hip arthrocentesis    2/19/17 TOLERANCE  Room air  No shortness of breath    AM-PAC '6-Clicks' INPATIENT SHORT FORM - BASIC MOBILITY  How much difficulty does the patient currently have. ..  -   Turning over in bed (including adjusting bedclothes, sheets and blankets)?: A Lot   -   Sitt level: independent   current sit to supine  2 assist used for pain control and safety    Goal #2  Patient is able to demonstrate transfers Sit to/from Stand at assistance level: independent   current  2 person assist needed     Goal #3  Pt able to amb with

## 2017-02-23 NOTE — DISCHARGE PLANNING
[facility] is not able to accept the pt. SW received a call from [facility] who stated they have a public aid bed available and would be able to accept the pt. Referral has been sent and updated PT/OT notes also sent.   Insurance approv

## 2017-02-23 NOTE — PROGRESS NOTES
[facility] Patient Status:  Inpatient    3/31/1965 MRN [id number]   Location [facility] 4W/SW/SE Attending Camilo Walker. [address] Day # 8 PCP No primary care provider on file.        Tiffanie Hammond is a 45year old ma dextrose injection 50 mL 50 mL Intravenous PRN   insulin aspart (NOVOLOG) 100 UNIT/ML flexpen 1-5 Units 1-5 Units Subcutaneous TID CC   influenza vaccine split quad (FLUARIX) ages 1 & older inj 0.5ml 0.5 mL Intramuscular Prior to discharge   Pneumococcal protein state malnutrition has been slowly improving with supportive care no voiding complaints no sign of skin breakdown or skin infection.   We will continue to treat patient conservatively reviewed recent recommendations by hematology for anticoagulation

## 2017-02-23 NOTE — PROGRESS NOTES
[facility]    Progress Note    Jose J Albarran Patient Status:  Inpatient    3/31/1965 MRN [id number]   Location [facility] 4W/SW/SE Attending Candance Lewis, [address] [facility] Day # 8 PCP No primary care provider on file.      Subjec ongoing bleeding from hematoma.   Partner d/w Dr. Shar Cosby who recommends holding all anticoagulation, including heparin/lovenox sq.   - IR placed IVC filter 2/21  - Dr. Mamie Campbell rec starting lovenox in 1-2 days    HIV+  - ID on consult  - not on treatment no

## 2017-02-23 NOTE — PROGRESS NOTES
[facility]    Progress Note    Emma Reed Patient Status:  Inpatient    3/31/1965 MRN [id number]   Location [facility] 4W/SW/SE Attending Stacy Carroll, [address] [facility] Day # 8 PCP No primary care provider on file.        Subjec prn    Acute blood loss anemia from surgery and R hip hematoma  - has been transfused total 4 units prbc  - stop IVF  - JULIANA drain in place  - follow cbc    Acute DVT RLE  - there is concern for ongoing bleeding from hematoma.   Partner d/w Dr. Tameka Silva who

## 2017-02-23 NOTE — PROGRESS NOTES
INFECTIOUS DISEASE PROGRESS NOTE    Vishal Morse Patient Status:  Observation    3/31/1965 MRN [id number]   Location [facility] 4W/SW/SE Attending Booker Witt MD   Hosp Day # 8 PCP No primary care provider on file.      Subjective:  Patient se fracture.  CT R hip w/o fracture but severe rheumatoid arthritis with erosions and large complex R hip effusion. CT R knee with no osseous injury with mild tricomprtmental OA. Trace effusion.  Seen by IR 2/15 and s/p US R hip aspiration with 3 mL purulent f

## 2017-02-23 NOTE — PLAN OF CARE
Report called to [facility]. Patient being transferred to room 572. Patient aware. Patient belongings transferred with patient.

## 2017-02-23 NOTE — PROGRESS NOTES
Patient stable  Comfortable and less pain. All joints improved .     Right hip less pain but with thigh hematoma  Hg improved to 11.2 and PT down to 13.2 with vitamin k    Discussed with Dr. Kenzie Quinonez and with Dr. Mcfarlane Client, and melva discuss with [facility]

## 2017-02-23 NOTE — PAYOR COMM NOTE
Progress Notes by Zev Smalls MD at 2/18/2017 10:04 AM      Author: Zev Smalls MD Service: (none) Author Type: Physician     Filed: 2/18/2017 10:07 AM Note Time: 2/18/2017 10:04 AM Status: Signed     : Zev Smalls MD (Physician)     [address] [place] [address]  >60    CA   7.8*   7.3*   7.6*    ALB    --     --    1.9*    NA   130*   132*   131*    K   3.6   3.6   3.6    CL   102   104   101    CO2   20*   22   25    GLU   163*   96   96    BILT    --     --    0.9    AST    --     --    37    ALT    --     --   Status: Signed     : CHAYA Gary (Nurse Practitioner)     [facility]  Inpatient Pain Management Progress Note      Patient name: Emma Reed 45year old male  : 3/31/1965  MRN: [id number]    Diagnosis: post operat [facility] Patient Status:  Niurka Macias 3/31/1965  MRN  [id number]    [facility] 4W/SW/SE  Attending  Quan Berry 860 Day #  7  PCP  No primary care provider on file.        Dipti Aponte is Intravenous  Q6H PRN    dextrose injection 50 mL  50 mL  Intravenous  PRN    insulin aspart (NOVOLOG) 100 UNIT/ML flexpen 1-5 Units  1-5 Units  Subcutaneous  TID CC    influenza vaccine split quad (FLUARIX) ages 1 & older inj 0.5ml  0.5 mL  Intramuscular  admitted with septic right hip arthritis status post incision and drainage by orthopedics cultures on appropriate treatment by infectious disease however is complicating factors of HIV positive with patient noncompliant with antiviral medications at least  02/21/17 2128   02/22/17   1016    WBC   4.7   2.7*    --    2.5*    HGB   8.5*   7.2*   7.3*   7.6*    PLT   183   184    --    171          Recent Labs    Lab  02/20/17   0517   02/21/17   0431   02/22/17   0415    NA   128*   129*   129*    K   4.4 undetectable and CD4 50              - repeat CD4 2/15/17 13 (20%)              - MRI R hip 2/16 - advanced arthritis with joint effusion w/o osteomyelitis or abscess  # Arthritis    PLAN:    - continue ceftriaxone, will need 6 weeks at least  - f/u repeat 02/20/2017    TP  5.8*  02/20/2017    AST  60*  02/20/2017    ALT  43  02/20/2017    PTT  31.8  02/22/2017    INR  1.3*  02/22/2017    ESRML  50*  02/15/2017    CRP  13.4*  02/15/2017    TROP  0.00  10/29/2016        Us Venous Doppler Leg Right - Diag Img Author: CHAYA Wolfe Service: (none) Author Type: CHAYA     Filed: 2/23/2017 12:27 PM Note Time: 2/23/2017 12:19 PM Status: Signed     : CHAYA Wolfe (CHAYA)     [address] [facility]    Progress Note  [address] [facility] to meds.      Acute blood loss anemia from surgery and R hip hematoma  - drop in hgb  - s/p total of 4 units prbc  - reduce IVF  - JULIANA drain in place  - hgb stable now    Acute DVT RLE  - there is concern for ongoing bleeding from hematoma.   Partner d/w

## 2017-02-23 NOTE — PLAN OF CARE
Altered Communication/Language Barrier    • Patient/Family is able to understand and participate in their care Progressing        HEMATOLOGIC - ADULT    • Maintains hematologic stability Progressing    • Free from bleeding injury Progressing        Impaire

## 2017-02-24 LAB
APTT PPP: 32.2 SECONDS (ref 23.2–35.3)
AT III ACT/NOR PPP CHRO: 58 % NHP (ref 90–122)
BASOPHILS # BLD: 0 K/UL (ref 0–0.2)
BASOPHILS NFR BLD: 1 %
CARDIOLIPIN IGA SERPL-ACNC: 1.7 APL (ref 0–11.9)
CARDIOLIPIN IGG SERPL-ACNC: 16.9 GPL (ref 0–14.9)
CARDIOLIPIN IGM SERPL-ACNC: 10.1 MPL (ref 0–12.4)
CONFIRM DRVVT: 1.2 S (ref 0–1.1)
EOSINOPHIL # BLD: 0 K/UL (ref 0–0.7)
EOSINOPHIL NFR BLD: 1 %
ERYTHROCYTE [DISTWIDTH] IN BLOOD BY AUTOMATED COUNT: 16.1 % (ref 11–15)
GLUCOSE BLDC GLUCOMTR-MCNC: 106 MG/DL (ref 70–99)
GLUCOSE BLDC GLUCOMTR-MCNC: 112 MG/DL (ref 70–99)
GLUCOSE BLDC GLUCOMTR-MCNC: 88 MG/DL (ref 70–99)
GLUCOSE BLDC GLUCOMTR-MCNC: 97 MG/DL (ref 70–99)
HCT VFR BLD AUTO: 32 % (ref 41–52)
HGB BLD-MCNC: 10.7 G/DL (ref 13.5–17.5)
INR BLD: 1.3 (ref 0.9–1.2)
LYMPHOCYTES # BLD: 0.3 K/UL (ref 1–4)
LYMPHOCYTES NFR BLD: 11 %
MCH RBC QN AUTO: 28 PG (ref 27–32)
MCHC RBC AUTO-ENTMCNC: 33.5 G/DL (ref 32–37)
MCV RBC AUTO: 83.6 FL (ref 80–100)
MONOCYTES # BLD: 0.3 K/UL (ref 0–1)
MONOCYTES NFR BLD: 12 %
NEUTROPHILS # BLD AUTO: 2 K/UL (ref 1.8–7.7)
NEUTROPHILS NFR BLD: 75 %
PLATELET # BLD AUTO: 235 K/UL (ref 140–400)
PMV BLD AUTO: 7.1 FL (ref 7.4–10.3)
PROT C ACT/NOR PPP: 49 % (ref 67–194)
PROT S ACT/NOR PPP: 55 % (ref 48–153)
PROTHROMBIN TIME: 14.9 SECONDS (ref 11.8–14.5)
PROTHROMBIN TIME: 15.3 SECONDS (ref 11.8–14.5)
RBC # BLD AUTO: 3.83 M/UL (ref 4.5–5.9)
SCREEN DRVVT: 1 S (ref 0–1.1)
WBC # BLD AUTO: 2.7 K/UL (ref 4–11)

## 2017-02-24 PROCEDURE — 99232 SBSQ HOSP IP/OBS MODERATE 35: CPT | Performed by: UROLOGY

## 2017-02-24 PROCEDURE — 99233 SBSQ HOSP IP/OBS HIGH 50: CPT | Performed by: INTERNAL MEDICINE

## 2017-02-24 PROCEDURE — 99233 SBSQ HOSP IP/OBS HIGH 50: CPT | Performed by: HOSPITALIST

## 2017-02-24 RX ORDER — METOPROLOL SUCCINATE 25 MG/1
25 TABLET, EXTENDED RELEASE ORAL
Status: DISCONTINUED | OUTPATIENT
Start: 2017-02-24 | End: 2017-02-27

## 2017-02-24 RX ORDER — ENOXAPARIN SODIUM 100 MG/ML
1 INJECTION SUBCUTANEOUS EVERY 12 HOURS SCHEDULED
Status: DISCONTINUED | OUTPATIENT
Start: 2017-02-24 | End: 2017-02-27

## 2017-02-24 RX ORDER — METOPROLOL SUCCINATE 25 MG/1
12.5 TABLET, EXTENDED RELEASE ORAL ONCE
Status: COMPLETED | OUTPATIENT
Start: 2017-02-24 | End: 2017-02-24

## 2017-02-24 NOTE — PROGRESS NOTES
INFECTIOUS DISEASE PROGRESS NOTE    Elvira Delong Patient Status:  Observation    3/31/1965 MRN [id number]   Location [facility] 4W/SW/SE Attending Paul Bowser MD   Hosp Day # 9 PCP No primary care provider on file.      Subjective:  Patient se rheumatoid arthritis with erosions and large complex R hip effusion. CT R knee with no osseous injury with mild tricomprtmental OA. Trace effusion. Seen by IR 2/15 and s/p US R hip aspiration with 3 mL purulent fluid removed.      # R hip septic arthritis s

## 2017-02-24 NOTE — DISCHARGE PLANNING
330pm- RONAK received a call from [facility] and at this time, they no longer have a bed for the pt. SW team will follow up on Monday. Berna Rogers may have a public aid bed.     [facility] has insurance auth and a bed available for t

## 2017-02-24 NOTE — PROGRESS NOTES
[place] [facility] - [facility]    Progress Note    Benedicto Rom Patient Status:  Inpatient    3/31/1965 MRN [id number]   Location [facility] 5SW/SE Attending Ben Moreira, [address] [facility] Day # 9 PCP No primary care provider on file.        Subjecti surgery and R hip hematoma  - has been transfused total 4 units prbc  - off IVF  - JULIANA drain in place  - follow cbc    Acute DVT RLE  - there is concern for ongoing bleeding from hematoma.   Partner d/w Dr. Zaira Mancuso who recommends holding all anticoagulatio

## 2017-02-24 NOTE — PROGRESS NOTES
[place] [facility] - [facility]    Progress Note    Johann Pereyra Patient Status:  Inpatient    3/31/1965 MRN [id number]   Location [facility] 4W/SW/SE Attending Donis Dobbins, [address] [facility]  Day # 9 PCP No primary care provider on file.      Jose Polk ongoing bleeding from hematoma.   Partner d/w Dr. Fabiola Terrell who recommends holding all anticoagulation, including heparin/lovenox sq.   - IR placed IVC filter 2/21  - Dr. Danny Ham rec starting lovenox in 1-2 days    HIV+  - ID on consult  - not on treatment no

## 2017-02-24 NOTE — PROGRESS NOTES
[facility]  Hematology  Progress Note    Dipti Aponte Patient Status:  Inpatient    3/31/1965 MRN [id number]   Location [facility] 5SW/SE Attending Ruy Maldonado, [address] [facility] Day # 9 PCP No primary care provider on file.      Subjectiv underwent orthopedic surgical evaluation for his condition and is currently being treated for septic arthritis with multiple joint involvement, sepsis bacteremia due to strep pneumonia, blood loss related to recent surgery in the setting of hematoma, evide ferrous sulfate 325 mg twice daily with meals for iron replacement  --No signs of nutritional deficiencies like B12 and folate deficiency or evidence of hemolysis  --Patients with sepsis and might be experiencing some decreased bone marrow function which m

## 2017-02-24 NOTE — PROGRESS NOTES
[facility] Patient Status:  Inpatient    3/31/1965 MRN [id number]   Location [facility] 5SW/SE Attending Ketan Dugan. [address] Day # 9 PCP No primary care provider on file.        David Mcnulty is a 45year old male & older inj 0.5ml 0.5 mL Intramuscular Prior to discharge   Pneumococcal Vac Polyvalent (PNEUMOVAX) injection 0.5 mL 0.5 mL Subcutaneous Prior to discharge   Umeclidinium Bromide (INCRUSE ELLIPTA) 62.5 MCG/INH inhaler 1 puff 1 puff Inhalation Daily   Cyclo

## 2017-02-24 NOTE — PLAN OF CARE
Problem: Patient/Family Goals  Goal: Patient/Family Long Term Goal  Patient’s Long Term Goal: patient will return home    Interventions:  - iv abx  -prn pain meds  - See additional Care Plan goals for specific interventions   Outcome: Progressing  Goal: Pa scale  - Administer analgesics based on type and severity of pain and evaluate response  - Implement non-pharmacological measures as appropriate and evaluate response  - Consider cultural and social influences on pain and pain management  - Manage/alleviat intact  INTERVENTIONS  - Assess and document risk factors for pressure ulcer development  - Assess and document skin integrity  - Monitor for areas of redness and/or skin breakdown  - Initiate interventions, skin care algorithm/standards of care as needed know as we care for you?  - Provide timely, complete, and accurate information to patient/family  - Incorporate patient and family knowledge, values, beliefs, and cultural backgrounds into the planning and delivery of care  - Encourage patient/family to pa

## 2017-02-25 LAB
ANION GAP SERPL CALC-SCNC: 6 MMOL/L (ref 0–18)
BASOPHILS # BLD: 0 K/UL (ref 0–0.2)
BASOPHILS NFR BLD: 1 %
BUN SERPL-MCNC: 15 MG/DL (ref 8–20)
BUN/CREAT SERPL: 18.3 (ref 10–20)
CALCIUM SERPL-MCNC: 7.6 MG/DL (ref 8.5–10.5)
CHLORIDE SERPL-SCNC: 98 MMOL/L (ref 95–110)
CO2 SERPL-SCNC: 26 MMOL/L (ref 22–32)
CREAT SERPL-MCNC: 0.82 MG/DL (ref 0.5–1.5)
EOSINOPHIL # BLD: 0 K/UL (ref 0–0.7)
EOSINOPHIL NFR BLD: 2 %
ERYTHROCYTE [DISTWIDTH] IN BLOOD BY AUTOMATED COUNT: 16 % (ref 11–15)
GLUCOSE BLDC GLUCOMTR-MCNC: 103 MG/DL (ref 70–99)
GLUCOSE BLDC GLUCOMTR-MCNC: 115 MG/DL (ref 70–99)
GLUCOSE BLDC GLUCOMTR-MCNC: 151 MG/DL (ref 70–99)
GLUCOSE BLDC GLUCOMTR-MCNC: 89 MG/DL (ref 70–99)
GLUCOSE SERPL-MCNC: 95 MG/DL (ref 70–99)
HCT VFR BLD AUTO: 31 % (ref 41–52)
HGB BLD-MCNC: 10.4 G/DL (ref 13.5–17.5)
LYMPHOCYTES # BLD: 0.3 K/UL (ref 1–4)
LYMPHOCYTES NFR BLD: 14 %
MCH RBC QN AUTO: 27.9 PG (ref 27–32)
MCHC RBC AUTO-ENTMCNC: 33.5 G/DL (ref 32–37)
MCV RBC AUTO: 83.3 FL (ref 80–100)
MONOCYTES # BLD: 0.4 K/UL (ref 0–1)
MONOCYTES NFR BLD: 19 %
NEUTROPHILS # BLD AUTO: 1.4 K/UL (ref 1.8–7.7)
NEUTROPHILS NFR BLD: 65 %
OSMOLALITY UR CALC.SUM OF ELEC: 271 MOSM/KG (ref 275–295)
PLATELET # BLD AUTO: 239 K/UL (ref 140–400)
PMV BLD AUTO: 7.3 FL (ref 7.4–10.3)
POTASSIUM SERPL-SCNC: 4.2 MMOL/L (ref 3.3–5.1)
RBC # BLD AUTO: 3.72 M/UL (ref 4.5–5.9)
SODIUM SERPL-SCNC: 130 MMOL/L (ref 136–144)
WBC # BLD AUTO: 2.2 K/UL (ref 4–11)

## 2017-02-25 PROCEDURE — 99232 SBSQ HOSP IP/OBS MODERATE 35: CPT | Performed by: HOSPITALIST

## 2017-02-25 NOTE — PHYSICAL THERAPY NOTE
PHYSICAL THERAPY TREATMENT NOTE - INPATIENT    Room Number: 354/292-Z       Presenting Problem: pt admitted on 2/15/17 with multiple falls and right hip pain   pt is s/p  right hip aspiration on 2/15    2/17 US guided  right hip arthrocentesis    2/19/17 '6-Clicks' INPATIENT SHORT FORM - BASIC MOBILITY  How much difficulty does the patient currently have. ..  -   Turning over in bed (including adjusting bedclothes, sheets and blankets)?: A Lot   -   Sitting down on and standing up from a chair with arms (e. independent    current sit to supine  1 person mod A for pain control and safety     Goal #2   Patient is able to demonstrate transfers Sit to/from Stand at assistance level: independent    current  2 person assist needed      Goal #3   Pt able to amb with

## 2017-02-25 NOTE — PROGRESS NOTES
[place] [facility] - [facility]    Progress Note    Elvira Delong Patient Status:  Inpatient    3/31/1965 MRN [id number]   Location [facility] 5SW/SE Attending Lady Barajas, [address] [facility] Day # 10 PCP No primary care provider on file.        Subject surgery and R hip hematoma  - has been transfused total 4 units prbc  - off IVF  - JULIANA drain in place  - follow cbc    Acute DVT RLE  - there was concern for ongoing bleeding from hematoma.      - heme on consult  Hgb stable so lovenox started.   - IR placed

## 2017-02-25 NOTE — OPERATIVE REPORT
[facility]    PATIENT'S NAME: Donta Garrisongle   ATTENDING PHYSICIAN: Fidelia Mckeon MD   OPERATING PHYSICIAN: Gladis Flores MD   PATIENT ACCOUNT#:   [id number]    LOCATION:  [address] [place] [address] #:   [id number]       DATE OF BIRTH doctor at [facility].  He had also not been seeing his primary [address], nurse practitioner Milton Arboleda. He had been assigned to a nurse practitioner in the [address] [place] [address] but had not seen her.     The patient understood the high risk for morbi make a small incision over the anterolateral aspect of the hip. A Duncan-Spence approach was utilized directly over the greater trochanter. We were able to incise the tensor fascia jarrod with edema to the area.   We were able to then open the hip capsule, s

## 2017-02-26 LAB
ANION GAP SERPL CALC-SCNC: 5 MMOL/L (ref 0–18)
BASOPHILS # BLD: 0 K/UL (ref 0–0.2)
BASOPHILS NFR BLD: 1 %
BUN SERPL-MCNC: 17 MG/DL (ref 8–20)
BUN/CREAT SERPL: 20 (ref 10–20)
CALCIUM SERPL-MCNC: 7.7 MG/DL (ref 8.5–10.5)
CHLORIDE SERPL-SCNC: 99 MMOL/L (ref 95–110)
CO2 SERPL-SCNC: 27 MMOL/L (ref 22–32)
CREAT SERPL-MCNC: 0.85 MG/DL (ref 0.5–1.5)
EOSINOPHIL # BLD: 0.1 K/UL (ref 0–0.7)
EOSINOPHIL NFR BLD: 3 %
ERYTHROCYTE [DISTWIDTH] IN BLOOD BY AUTOMATED COUNT: 16.3 % (ref 11–15)
GLUCOSE BLDC GLUCOMTR-MCNC: 108 MG/DL (ref 70–99)
GLUCOSE BLDC GLUCOMTR-MCNC: 118 MG/DL (ref 70–99)
GLUCOSE BLDC GLUCOMTR-MCNC: 172 MG/DL (ref 70–99)
GLUCOSE BLDC GLUCOMTR-MCNC: 95 MG/DL (ref 70–99)
GLUCOSE SERPL-MCNC: 115 MG/DL (ref 70–99)
HCT VFR BLD AUTO: 30.9 % (ref 41–52)
HGB BLD-MCNC: 10.3 G/DL (ref 13.5–17.5)
LYMPHOCYTES # BLD: 0.4 K/UL (ref 1–4)
LYMPHOCYTES NFR BLD: 17 %
MCH RBC QN AUTO: 28 PG (ref 27–32)
MCHC RBC AUTO-ENTMCNC: 33.5 G/DL (ref 32–37)
MCV RBC AUTO: 83.5 FL (ref 80–100)
MONOCYTES # BLD: 0.4 K/UL (ref 0–1)
MONOCYTES NFR BLD: 18 %
NEUTROPHILS # BLD AUTO: 1.4 K/UL (ref 1.8–7.7)
NEUTROPHILS NFR BLD: 62 %
OSMOLALITY UR CALC.SUM OF ELEC: 274 MOSM/KG (ref 275–295)
PLATELET # BLD AUTO: 250 K/UL (ref 140–400)
PMV BLD AUTO: 7 FL (ref 7.4–10.3)
POTASSIUM SERPL-SCNC: 4 MMOL/L (ref 3.3–5.1)
RBC # BLD AUTO: 3.69 M/UL (ref 4.5–5.9)
SODIUM SERPL-SCNC: 131 MMOL/L (ref 136–144)
WBC # BLD AUTO: 2.2 K/UL (ref 4–11)

## 2017-02-26 PROCEDURE — 99232 SBSQ HOSP IP/OBS MODERATE 35: CPT | Performed by: INTERNAL MEDICINE

## 2017-02-26 PROCEDURE — 99232 SBSQ HOSP IP/OBS MODERATE 35: CPT | Performed by: HOSPITALIST

## 2017-02-26 RX ORDER — GABAPENTIN 300 MG/1
300 CAPSULE ORAL NIGHTLY
Status: DISCONTINUED | OUTPATIENT
Start: 2017-02-26 | End: 2017-02-27

## 2017-02-26 RX ORDER — GABAPENTIN 100 MG/1
100 CAPSULE ORAL 2 TIMES DAILY
Status: DISCONTINUED | OUTPATIENT
Start: 2017-02-26 | End: 2017-02-27

## 2017-02-26 NOTE — PROGRESS NOTES
[facility]    Progress Note    Devyn Diego Patient Status:  Inpatient    3/31/1965 MRN [id number]   Location [facility] 5SW/SE Attending Antonella Bowers, [address] [facility] [address] Day # 11 PCP No primary care provider on file.        Subject hip hematoma  - has been transfused total 4 units prbc  - off IVF  - JULIANA drain in place  - follow cbc    Acute DVT RLE  - there was concern for ongoing bleeding from hematoma.       - heme on consult  Hgb stable so lovenox started.   - IR placed IVC filter 2

## 2017-02-26 NOTE — PLAN OF CARE
Patient a/ox3; gave Percocet for pain control; dressing change to right hip done today, site clean and dry, staples intact. Call light within reach.

## 2017-02-26 NOTE — PROGRESS NOTES
[place]    Progress Note    Bess Butler Patient Status:  Inpatient    3/31/1965 MRN [id number]   Location [facility] 5SW/SE Attending Jayce Sullivan, [facility] Day # 11 PCP No primary care provider on file.      Subject --No indication for hypercoagulable workup as the patient has several risk factors for clot development including his HIV inflammatory status, recent immobilization of his lower extremities, as well as recent surgical interventions  --Patient's lowered ant Collection Time: 02/25/17  5:44 PM   Result Value Ref Range   POC Glucose  151 (H) 70-99   -POCT GLUCOSE   Collection Time: 02/25/17  9:16 PM   Result Value Ref Range   POC Glucose  115 (H) 18-24   -BASIC METABOLIC PANEL (8)   Collection Time: 02/26/17  5: 140-400 K/ 239 235   MEAN PLATELET VOLUME      7.4-10.3 fL 7.0 (L) 7.3 (L) 7.1 (L)   Neutrophils %       62 65 75   Lymphocytes %       17 14 11   Monocytes %       18 19 12   Eosinophils %       3 2 1   Basophils %       1 1 1   Neutrophils Abso

## 2017-02-27 ENCOUNTER — LAB REQUISITION (OUTPATIENT)
Dept: LAB | Facility: HOSPITAL | Age: 52
End: 2017-02-27
Attending: FAMILY MEDICINE
Payer: MEDICAID

## 2017-02-27 VITALS
HEART RATE: 106 BPM | RESPIRATION RATE: 20 BRPM | OXYGEN SATURATION: 93 % | BODY MASS INDEX: 24.08 KG/M2 | TEMPERATURE: 99 F | WEIGHT: 172 LBS | HEIGHT: 71 IN | SYSTOLIC BLOOD PRESSURE: 111 MMHG | DIASTOLIC BLOOD PRESSURE: 69 MMHG

## 2017-02-27 DIAGNOSIS — R50.9 FEVER: ICD-10-CM

## 2017-02-27 LAB
ALBUMIN SERPL-MCNC: 1.8 G/DL (ref 3.5–4.8)
ALP LIVER SERPL-CCNC: 70 U/L (ref 45–117)
ALT SERPL-CCNC: 65 U/L (ref 17–63)
AST SERPL-CCNC: 95 U/L (ref 15–41)
BASOPHILS # BLD AUTO: 0.01 X10(3) UL (ref 0–0.1)
BASOPHILS # BLD: 0 K/UL (ref 0–0.2)
BASOPHILS NFR BLD AUTO: 0.4 %
BASOPHILS NFR BLD: 1 %
BILIRUB SERPL-MCNC: 0.6 MG/DL (ref 0.1–2)
BUN BLD-MCNC: 19 MG/DL (ref 8–20)
CALCIUM BLD-MCNC: 8.1 MG/DL (ref 8.3–10.3)
CHLORIDE: 101 MMOL/L (ref 101–111)
CO2: 25 MMOL/L (ref 22–32)
CREAT BLD-MCNC: 0.92 MG/DL (ref 0.7–1.3)
EOSINOPHIL # BLD AUTO: 0.06 X10(3) UL (ref 0–0.3)
EOSINOPHIL # BLD: 0.1 K/UL (ref 0–0.7)
EOSINOPHIL NFR BLD AUTO: 2.6 %
EOSINOPHIL NFR BLD: 3 %
ERYTHROCYTE [DISTWIDTH] IN BLOOD BY AUTOMATED COUNT: 15.3 % (ref 11.5–16)
ERYTHROCYTE [DISTWIDTH] IN BLOOD BY AUTOMATED COUNT: 16.1 % (ref 11–15)
GLUCOSE BLD-MCNC: 100 MG/DL (ref 70–99)
GLUCOSE BLDC GLUCOMTR-MCNC: 109 MG/DL (ref 70–99)
GLUCOSE BLDC GLUCOMTR-MCNC: 135 MG/DL (ref 70–99)
HCT VFR BLD AUTO: 30.8 % (ref 37–53)
HCT VFR BLD AUTO: 32.4 % (ref 41–52)
HGB BLD-MCNC: 10.4 G/DL (ref 13–17)
HGB BLD-MCNC: 10.7 G/DL (ref 13.5–17.5)
IMMATURE GRANULOCYTE COUNT: 0.05 X10(3) UL (ref 0–1)
IMMATURE GRANULOCYTE RATIO %: 2.2 %
LYMPHOCYTES # BLD AUTO: 0.36 X10(3) UL (ref 0.9–4)
LYMPHOCYTES # BLD: 0.3 K/UL (ref 1–4)
LYMPHOCYTES NFR BLD AUTO: 15.7 %
LYMPHOCYTES NFR BLD: 17 %
M PROTEIN MFR SERPL ELPH: 7.9 G/DL (ref 6.1–8.3)
MCH RBC QN AUTO: 27.7 PG (ref 27–32)
MCH RBC QN AUTO: 28.2 PG (ref 27–33.2)
MCHC RBC AUTO-ENTMCNC: 32.9 G/DL (ref 32–37)
MCHC RBC AUTO-ENTMCNC: 33.8 G/DL (ref 31–37)
MCV RBC AUTO: 83.5 FL (ref 80–99)
MCV RBC AUTO: 84.1 FL (ref 80–100)
MONOCYTES # BLD AUTO: 0.43 X10(3) UL (ref 0.1–0.6)
MONOCYTES # BLD: 0.4 K/UL (ref 0–1)
MONOCYTES NFR BLD AUTO: 18.8 %
MONOCYTES NFR BLD: 18 %
NEUTROPHIL ABS PRELIM: 1.38 X10 (3) UL (ref 1.3–6.7)
NEUTROPHILS # BLD AUTO: 1.2 K/UL (ref 1.8–7.7)
NEUTROPHILS # BLD AUTO: 1.38 X10(3) UL (ref 1.3–6.7)
NEUTROPHILS NFR BLD AUTO: 60.3 %
NEUTROPHILS NFR BLD: 61 %
PLATELET # BLD AUTO: 261 K/UL (ref 140–400)
PLATELET # BLD AUTO: 286 10(3)UL (ref 150–450)
PMV BLD AUTO: 7.2 FL (ref 7.4–10.3)
POTASSIUM SERPL-SCNC: 4.3 MMOL/L (ref 3.6–5.1)
RBC # BLD AUTO: 3.69 X10(6)UL (ref 4.3–5.7)
RBC # BLD AUTO: 3.85 M/UL (ref 4.5–5.9)
RED CELL DISTRIBUTION WIDTH-SD: 46.5 FL (ref 35.1–46.3)
SODIUM SERPL-SCNC: 133 MMOL/L (ref 136–144)
WBC # BLD AUTO: 2 K/UL (ref 4–11)
WBC # BLD AUTO: 2.3 X10(3) UL (ref 4–13)

## 2017-02-27 PROCEDURE — 87077 CULTURE AEROBIC IDENTIFY: CPT | Performed by: FAMILY MEDICINE

## 2017-02-27 PROCEDURE — 87040 BLOOD CULTURE FOR BACTERIA: CPT | Performed by: FAMILY MEDICINE

## 2017-02-27 PROCEDURE — 83036 HEMOGLOBIN GLYCOSYLATED A1C: CPT | Performed by: FAMILY MEDICINE

## 2017-02-27 PROCEDURE — 87150 DNA/RNA AMPLIFIED PROBE: CPT | Performed by: FAMILY MEDICINE

## 2017-02-27 PROCEDURE — 87186 SC STD MICRODIL/AGAR DIL: CPT | Performed by: FAMILY MEDICINE

## 2017-02-27 PROCEDURE — 85025 COMPLETE CBC W/AUTO DIFF WBC: CPT | Performed by: FAMILY MEDICINE

## 2017-02-27 PROCEDURE — 80053 COMPREHEN METABOLIC PANEL: CPT | Performed by: FAMILY MEDICINE

## 2017-02-27 PROCEDURE — 99238 HOSP IP/OBS DSCHRG MGMT 30/<: CPT | Performed by: HOSPITALIST

## 2017-02-27 RX ORDER — ACETAMINOPHEN 325 MG/1
650 TABLET ORAL EVERY 6 HOURS PRN
Qty: 30 TABLET | Refills: 0 | Status: ON HOLD | OUTPATIENT
Start: 2017-02-27 | End: 2018-10-26

## 2017-02-27 RX ORDER — METOPROLOL SUCCINATE 25 MG/1
25 TABLET, EXTENDED RELEASE ORAL
Qty: 60 TABLET | Refills: 0 | Status: ON HOLD | OUTPATIENT
Start: 2017-02-27 | End: 2017-04-07

## 2017-02-27 RX ORDER — OXYCODONE AND ACETAMINOPHEN 10; 325 MG/1; MG/1
1 TABLET ORAL EVERY 4 HOURS PRN
Qty: 30 TABLET | Refills: 0 | Status: ON HOLD | OUTPATIENT
Start: 2017-02-27 | End: 2017-04-01

## 2017-02-27 RX ORDER — ENOXAPARIN SODIUM 100 MG/ML
1 INJECTION SUBCUTANEOUS EVERY 12 HOURS SCHEDULED
Qty: 48 ML | Refills: 5 | Status: ON HOLD | OUTPATIENT
Start: 2017-02-27 | End: 2017-04-07

## 2017-02-27 RX ORDER — MELATONIN
325 2 TIMES DAILY WITH MEALS
Qty: 60 TABLET | Refills: 0 | Status: ON HOLD | OUTPATIENT
Start: 2017-02-27 | End: 2017-04-01

## 2017-02-27 RX ORDER — GABAPENTIN 300 MG/1
300 CAPSULE ORAL NIGHTLY
Qty: 30 CAPSULE | Refills: 0 | Status: ON HOLD | OUTPATIENT
Start: 2017-02-27 | End: 2017-04-07

## 2017-02-27 RX ORDER — FLUCONAZOLE 200 MG/1
200 TABLET ORAL DAILY
Qty: 7 TABLET | Refills: 0 | Status: ON HOLD | OUTPATIENT
Start: 2017-02-27 | End: 2017-03-08

## 2017-02-27 RX ORDER — GABAPENTIN 100 MG/1
100 CAPSULE ORAL 2 TIMES DAILY
Qty: 30 CAPSULE | Refills: 0 | Status: ON HOLD | OUTPATIENT
Start: 2017-02-27 | End: 2017-04-07

## 2017-02-27 NOTE — PROGRESS NOTES
INFECTIOUS DISEASE PROGRESS NOTE    Trinidad Stanford Patient Status:  Observation    3/31/1965 MRN [id number]   Location [facility] 4W/SW/SE Attending Torri Juarez MD   Hosp Day # 12 PCP No primary care provider on file.      Subjective:  Patient s osseous injury with mild tricomprtmental OA. Trace effusion. Seen by IR 2/15 and s/p US R hip aspiration with 3 mL purulent fluid removed.      # R hip septic arthritis s/p aspiration 2/15/17 with purulence - +S. pneumoniae    - aspiration 2/15/17 with WBC

## 2017-02-27 NOTE — PLAN OF CARE
Patient a/ox4; c/o pain 10/10-gave Dilaudid; VSS except temp 100.4-gave Tylenol with repeat at 98.9;

## 2017-02-27 NOTE — PROGRESS NOTES
Patient being discharged this afternoon. PICC to remain in place. Patient will be going to [facility] in [facility]. Wife notified. PICC dressing changed. Right hip dressing also changed. IV rocephin given prior to discharge.  Report given to Chio Valencia from [facility]

## 2017-02-27 NOTE — DISCHARGE PLANNING
Bed confirmed at [facility] for today. RN states the MD provided the dc order. RN states pt requires an ambulance as he cannot transfer without great assistance.  [facility] ambulance has been arranged for today 2/27 at 3:30pm. RN states pt's si

## 2017-02-27 NOTE — PROGRESS NOTES
[facility]    Progress Note    Dipti Aponte Patient Status:  Inpatient    3/31/1965 MRN [id number]   Location [facility] 5SW/SE Attending Ruy Maldonado, [address] [facility] Day # 12 PCP No primary care provider on file.        Subject dilaudid prn    Acute blood loss anemia from surgery and R hip hematoma  Hgb stable now.   - has been transfused total 4 units prbc  - off IVF  - JULIANA drain in place  - follow cbc    Acute DVT RLE  - there was concern for ongoing bleeding from hematoma.

## 2017-02-27 NOTE — PAYOR COMM NOTE
2/22/17  Subjective:      Pt c/o right leg pain.      Objective:    Blood pressure 115/60, pulse 101, temperature 100.8 °F (38.2 °C), temperature source Oral, resp. rate 18, height 5' 11\" (1.803 m), weight 172 lb (78.019 kg), SpO2 95 %. Gen:   NAD.   A blood loss anemia from surgery and R hip hematoma  - drop in hgb  - has been transfused total 4 units prbc  - reduce IVF  - JULIANA drain in place    Acute DVT RLE  - there is concern for ongoing bleeding from hematoma.   Partner d/w Dr. Edison Marti who recommends synovitis, no evidence of osteomyelitis  - ID and ortho on consult  - Preop w/u - stress test and echo ok. Cardiology cleared. - s/p I&D - await cultures from surgery. There was fluid and hematoma.   - ceftiaxone IV x 6 weeks per ID - then pt needs hip rep AST  60*  02/20/2017    ALT  43  02/20/2017    PTT  32.2  02/23/2017    PTT  32.2  02/23/2017    INR  1.3*  02/24/2017              Assessment and Plan:      Right hip septic arthritis  Sepsis with bacteremia 2/2 above  - s/p aspiration by IR - culture +

## 2017-02-27 NOTE — PHYSICAL THERAPY NOTE
PHYSICAL THERAPY TREATMENT NOTE - INPATIENT    Room Number: 128/522-W       Presenting Problem: pt admitted on 2/15/17 with multiple falls and right hip pain   pt is s/p  right hip aspiration on 2/15    2/17 US guided  right hip arthrocentesis    2/19/17 difficulty does the patient currently have. ..  -   Turning over in bed (including adjusting bedclothes, sheets and blankets)?: A Lot   -   Sitting down on and standing up from a chair with arms (e.g., wheelchair, bedside commode, etc.): A Lot   -   Moving

## 2017-02-28 LAB
EST. AVERAGE GLUCOSE BLD GHB EST-MCNC: 131 MG/DL (ref 68–126)
HBA1C MFR BLD HPLC: 6.2 % (ref ?–5.7)

## 2017-02-28 NOTE — DISCHARGE SUMMARY
[facility]    Discharge Summary    Alpesh Kahn Patient Status:  Inpatient    3/31/1965 MRN [id number]   Location [facility] 5SW/SE Attending No att. providers found   [facility] Day # 12 PCP No primary care provider on file. language barrier.     Hospital Course:     Pt was admitted and treated as below:    Right hip septic arthritis  Sepsis with bacteremia 2/2 above  - s/p aspiration by IR - culture +strep pneumo  - blood cx x2 +strep pneumo  - repeat blood cultures NGTD  - pi total) by mouth every 6 (six) hours as needed.     Quantity:  30 tablet   Refills:  0       Enoxaparin Sodium 80 MG/0.8ML Soln   Last time this was given:  80 mg on 2/27/2017  8:36 AM   Commonly known as:  LOVENOX        Inject 0.8 mL (80 mg total) into the these medications       Instructions Prescription details    Cyclobenzaprine HCl 10 MG Tabs   Last time this was given:  10 mg on 2/27/2017  8:36 AM   Commonly known as:  cyclobenzaprine        Take 10 mg by mouth 3 (three) times daily as needed for Muscle

## 2017-03-02 ENCOUNTER — LAB REQUISITION (OUTPATIENT)
Dept: LAB | Facility: HOSPITAL | Age: 52
End: 2017-03-02
Attending: FAMILY MEDICINE
Payer: COMMERCIAL

## 2017-03-02 DIAGNOSIS — Z00.00 ENCOUNTER FOR GENERAL ADULT MEDICAL EXAMINATION WITHOUT ABNORMAL FINDINGS: ICD-10-CM

## 2017-03-02 PROCEDURE — 87186 SC STD MICRODIL/AGAR DIL: CPT | Performed by: FAMILY MEDICINE

## 2017-03-02 PROCEDURE — 81001 URINALYSIS AUTO W/SCOPE: CPT | Performed by: FAMILY MEDICINE

## 2017-03-02 PROCEDURE — 87088 URINE BACTERIA CULTURE: CPT | Performed by: FAMILY MEDICINE

## 2017-03-02 PROCEDURE — 87086 URINE CULTURE/COLONY COUNT: CPT | Performed by: FAMILY MEDICINE

## 2017-03-03 ENCOUNTER — MEDICAL CORRESPONDENCE (OUTPATIENT)
Age: 52
End: 2017-03-03

## 2017-03-04 ENCOUNTER — LAB REQUISITION (OUTPATIENT)
Dept: LAB | Facility: HOSPITAL | Age: 52
End: 2017-03-04
Attending: FAMILY MEDICINE
Payer: MEDICAID

## 2017-03-04 ENCOUNTER — SNF ADMIT/H&P (OUTPATIENT)
Dept: FAMILY MEDICINE CLINIC | Facility: CLINIC | Age: 52
End: 2017-03-04

## 2017-03-04 DIAGNOSIS — F19.10 IV DRUG ABUSE (HCC): ICD-10-CM

## 2017-03-04 DIAGNOSIS — R50.9 FEVER: ICD-10-CM

## 2017-03-04 DIAGNOSIS — R53.1 GENERALIZED WEAKNESS: ICD-10-CM

## 2017-03-04 DIAGNOSIS — I95.9 HYPOTENSION, UNSPECIFIED HYPOTENSION TYPE: ICD-10-CM

## 2017-03-04 DIAGNOSIS — D62 ACUTE BLOOD LOSS ANEMIA: ICD-10-CM

## 2017-03-04 DIAGNOSIS — M00.251 STREPTOCOCCAL ARTHRITIS OF RIGHT HIP (HCC): Primary | ICD-10-CM

## 2017-03-04 DIAGNOSIS — B20 HIV (HUMAN IMMUNODEFICIENCY VIRUS INFECTION) (HCC): ICD-10-CM

## 2017-03-04 DIAGNOSIS — E87.8 ELECTROLYTE IMBALANCE: ICD-10-CM

## 2017-03-04 DIAGNOSIS — I82.4Z1 ACUTE DEEP VEIN THROMBOSIS (DVT) OF DISTAL END OF RIGHT LOWER EXTREMITY (HCC): ICD-10-CM

## 2017-03-04 DIAGNOSIS — Z91.14 NON COMPLIANCE W MEDICATION REGIMEN: ICD-10-CM

## 2017-03-04 DIAGNOSIS — J45.20 MILD INTERMITTENT ASTHMA WITHOUT COMPLICATION: ICD-10-CM

## 2017-03-04 DIAGNOSIS — B37.0 ORAL CANDIDOSIS: ICD-10-CM

## 2017-03-04 PROCEDURE — 99306 1ST NF CARE HIGH MDM 50: CPT | Performed by: FAMILY MEDICINE

## 2017-03-04 PROCEDURE — 87040 BLOOD CULTURE FOR BACTERIA: CPT | Performed by: FAMILY MEDICINE

## 2017-03-04 PROCEDURE — 87186 SC STD MICRODIL/AGAR DIL: CPT | Performed by: FAMILY MEDICINE

## 2017-03-04 PROCEDURE — 87077 CULTURE AEROBIC IDENTIFY: CPT | Performed by: FAMILY MEDICINE

## 2017-03-04 PROCEDURE — 87150 DNA/RNA AMPLIFIED PROBE: CPT | Performed by: FAMILY MEDICINE

## 2017-03-04 NOTE — PROGRESS NOTES
Skilled [address] [place] [address] [facility] Author: Fernanda Sosa MD     3/31/1965 MRN [id number]   [facility]  Admission 2/15/17      Last Hospital Discharge 17 PCP No primary care provider on file.    Hospital of Discharge 17       Burt Meds:    Current Outpatient Prescriptions on File Prior to Visit:  oxyCODONE-acetaminophen  MG Oral Tab Take 1 tablet by mouth every 4 (four) hours as needed.    acetaminophen 325 MG Oral Tab Take 2 tablets (650 mg total) by mouth every 6 (six) hours Estimated body mass index is 24.00 kg/(m^2) as calculated from the following:    Height as of 2/15/17: 71\". Weight as of 2/15/17: 172 lb. LINES picc line in place  Wound right hip, healing. Tenderness.    Physical Exam   Constitutional: He appears [facility], CT KNEE RIGHT (OYQ=[id number]), 2/15/2017, 4:15. INDICATIONS: Right general knee pain post fall today. TECHNIQUE: Two views were obtained. FINDINGS:  BONES: No evidence of recent fracture or dislocation.  There is demineralization of the michelle noted.  Middle pubic symphyseal degeneration is observed. Mild scoliosis and degenerative changes of the lumbar spine are seen.  SOFT TISSUES: There is a large, complex-appearing multilobulated right hip joint effusion with areas of probable peripheral thic for dose reduction was employed. FINDINGS:  COMMENT: The knee is imaged in flexion due to patient discomfort. However, this is suboptimal for evaluation. BONES: No acute fracture or dislocation is evident.  Tricompartmental joint space narrowing is present (cxb=44158)    2/16/2017  PROCEDURE: MRI HIPS, RIGHT (CPT=73721)  COMPARISON: None.   INDICATIONS: rt hip septic arthritis  TECHNIQUE: A comprehensive examination was performed utilizing a variety of imaging planes and imaging parameters to optimize visuali (CNP=26925)  COMPARISON: [facility] (CPT=93970), 10/29/2016, 6:06.   INDICATIONS: Right  leg swelling  TECHNIQUE: Color duplex Doppler venous ultrasound of the right lower extremity was performed in the 2/17/2017  CONCLUSION:  1. No acute findings. 2. Emphysema with pulmonary fibrosis. 3. Right upper lobe nodule. Radiology Result Scan    2/17/2017  Ordered by an unspecified provider.     Ir Ivc Filter Procedure    2/21/2017  PROCEDURE: Inferior re-constrained within the sheath. The [employer] IVC filter was then successfully deployed in infrarenal position without significant tilt. . The sheath was removed. Hemostasis was achieved with direct manual pressure, and a sterile dressing was applied. 2/17/2017 at 16:23          2/17/2017  CONCLUSION:  1. Technically successful ultrasound-guided right hip arthrocentesis with removal of purulent fluid.              Recent Results (from the past 72 hour(s))  -URINALYSIS, ROUTINE   Collection Time: 03/02/17 Oral candidosis  -antifungal, stable. 10. Electrolyte imbalance  Watching labs.      11. Hypotension, unspecified hypotension type  -watching, no new episodes    Niya Magana needs then following services:  Occupational Therapy  Physical Therapy  R

## 2017-03-05 ENCOUNTER — HOSPITAL ENCOUNTER (INPATIENT)
Facility: HOSPITAL | Age: 52
LOS: 5 days | Discharge: ANOTHER HOSPITAL | DRG: 549 | End: 2017-03-10
Attending: HOSPITALIST | Admitting: HOSPITALIST
Payer: MEDICAID

## 2017-03-05 ENCOUNTER — APPOINTMENT (OUTPATIENT)
Dept: GENERAL RADIOLOGY | Facility: HOSPITAL | Age: 52
End: 2017-03-05
Attending: EMERGENCY MEDICINE
Payer: MEDICAID

## 2017-03-05 ENCOUNTER — HOSPITAL ENCOUNTER (EMERGENCY)
Facility: HOSPITAL | Age: 52
Discharge: ANOTHER HOSPITAL | End: 2017-03-06
Attending: EMERGENCY MEDICINE
Payer: MEDICAID

## 2017-03-05 ENCOUNTER — HOSPITAL ENCOUNTER (EMERGENCY)
Facility: HOSPITAL | Age: 52
Discharge: STILL ADMITTED OR ADMINISTRATIVE | End: 2017-03-06
Payer: MEDICAID

## 2017-03-05 DIAGNOSIS — M00.251 STREPTOCOCCAL ARTHRITIS OF RIGHT HIP (HCC): ICD-10-CM

## 2017-03-05 DIAGNOSIS — I82.501 CHRONIC DEEP VEIN THROMBOSIS (DVT) OF RIGHT LOWER EXTREMITY, UNSPECIFIED VEIN (HCC): ICD-10-CM

## 2017-03-05 DIAGNOSIS — R50.9 FEVER IN ADULT: Primary | ICD-10-CM

## 2017-03-05 DIAGNOSIS — B20 HIV DISEASE (HCC): ICD-10-CM

## 2017-03-05 DIAGNOSIS — D64.9 ANEMIA, UNSPECIFIED TYPE: ICD-10-CM

## 2017-03-05 LAB
ALBUMIN SERPL-MCNC: 2 G/DL (ref 3.5–4.8)
ALP LIVER SERPL-CCNC: 71 U/L (ref 45–117)
ALT SERPL-CCNC: 53 U/L (ref 17–63)
AST SERPL-CCNC: 102 U/L (ref 15–41)
BASOPHILS # BLD AUTO: 0.02 X10(3) UL (ref 0–0.1)
BASOPHILS NFR BLD AUTO: 0.6 %
BILIRUB SERPL-MCNC: 0.4 MG/DL (ref 0.1–2)
BUN BLD-MCNC: 22 MG/DL (ref 8–20)
CALCIUM BLD-MCNC: 8.3 MG/DL (ref 8.3–10.3)
CHLORIDE: 100 MMOL/L (ref 101–111)
CO2: 27 MMOL/L (ref 22–32)
CREAT BLD-MCNC: 1.15 MG/DL (ref 0.7–1.3)
EOSINOPHIL # BLD AUTO: 0.02 X10(3) UL (ref 0–0.3)
EOSINOPHIL NFR BLD AUTO: 0.6 %
ERYTHROCYTE [DISTWIDTH] IN BLOOD BY AUTOMATED COUNT: 15.2 % (ref 11.5–16)
GLUCOSE BLD-MCNC: 106 MG/DL (ref 70–99)
HCT VFR BLD AUTO: 30.9 % (ref 37–53)
HGB BLD-MCNC: 10 G/DL (ref 13–17)
IMMATURE GRANULOCYTE COUNT: 0.06 X10(3) UL (ref 0–1)
IMMATURE GRANULOCYTE RATIO %: 1.9 %
LACTIC ACID: 0.9 MMOL/L (ref 0.5–2)
LYMPHOCYTES # BLD AUTO: 0.6 X10(3) UL (ref 0.9–4)
LYMPHOCYTES NFR BLD AUTO: 19 %
M PROTEIN MFR SERPL ELPH: 8.1 G/DL (ref 6.1–8.3)
MCH RBC QN AUTO: 27.5 PG (ref 27–33.2)
MCHC RBC AUTO-ENTMCNC: 32.4 G/DL (ref 31–37)
MCV RBC AUTO: 84.9 FL (ref 80–99)
MONOCYTES # BLD AUTO: 0.74 X10(3) UL (ref 0.1–0.6)
MONOCYTES NFR BLD AUTO: 23.5 %
NEUTROPHIL ABS PRELIM: 1.71 X10 (3) UL (ref 1.3–6.7)
NEUTROPHILS # BLD AUTO: 1.71 X10(3) UL (ref 1.3–6.7)
NEUTROPHILS NFR BLD AUTO: 54.4 %
PLATELET # BLD AUTO: 386 10(3)UL (ref 150–450)
POTASSIUM SERPL-SCNC: 4.3 MMOL/L (ref 3.6–5.1)
RBC # BLD AUTO: 3.64 X10(6)UL (ref 4.3–5.7)
RED CELL DISTRIBUTION WIDTH-SD: 46.9 FL (ref 35.1–46.3)
SODIUM SERPL-SCNC: 135 MMOL/L (ref 136–144)
WBC # BLD AUTO: 3.2 X10(3) UL (ref 4–13)

## 2017-03-05 PROCEDURE — 80053 COMPREHEN METABOLIC PANEL: CPT | Performed by: EMERGENCY MEDICINE

## 2017-03-05 PROCEDURE — 87040 BLOOD CULTURE FOR BACTERIA: CPT | Performed by: EMERGENCY MEDICINE

## 2017-03-05 PROCEDURE — 99291 CRITICAL CARE FIRST HOUR: CPT

## 2017-03-05 PROCEDURE — 96365 THER/PROPH/DIAG IV INF INIT: CPT

## 2017-03-05 PROCEDURE — 99223 1ST HOSP IP/OBS HIGH 75: CPT | Performed by: HOSPITALIST

## 2017-03-05 PROCEDURE — 96361 HYDRATE IV INFUSION ADD-ON: CPT

## 2017-03-05 PROCEDURE — 85025 COMPLETE CBC W/AUTO DIFF WBC: CPT | Performed by: EMERGENCY MEDICINE

## 2017-03-05 PROCEDURE — 99285 EMERGENCY DEPT VISIT HI MDM: CPT

## 2017-03-05 PROCEDURE — 36415 COLL VENOUS BLD VENIPUNCTURE: CPT

## 2017-03-05 PROCEDURE — 83605 ASSAY OF LACTIC ACID: CPT | Performed by: EMERGENCY MEDICINE

## 2017-03-05 PROCEDURE — 71010 XR CHEST AP PORTABLE  (CPT=71010): CPT

## 2017-03-05 PROCEDURE — 73502 X-RAY EXAM HIP UNI 2-3 VIEWS: CPT

## 2017-03-05 RX ORDER — CYCLOBENZAPRINE HCL 10 MG
10 TABLET ORAL 3 TIMES DAILY
Status: ON HOLD | COMMUNITY
End: 2017-04-07

## 2017-03-05 RX ORDER — ATAZANAVIR 300 MG/1
300 CAPSULE ORAL
COMMUNITY

## 2017-03-05 RX ORDER — METOPROLOL SUCCINATE 25 MG/1
25 TABLET, EXTENDED RELEASE ORAL 2 TIMES DAILY
Status: ON HOLD | COMMUNITY
End: 2017-04-07

## 2017-03-05 RX ORDER — METHADONE HYDROCHLORIDE 5 MG/1
5 TABLET ORAL EVERY 8 HOURS PRN
Status: ON HOLD | COMMUNITY
End: 2017-04-07

## 2017-03-05 RX ORDER — ENOXAPARIN SODIUM 100 MG/ML
1 INJECTION SUBCUTANEOUS EVERY 12 HOURS
Status: ON HOLD | COMMUNITY
End: 2017-04-07

## 2017-03-05 RX ORDER — OXYCODONE AND ACETAMINOPHEN 10; 325 MG/1; MG/1
1 TABLET ORAL EVERY 4 HOURS PRN
Status: ON HOLD | COMMUNITY
End: 2017-04-01

## 2017-03-05 RX ORDER — MELATONIN
325
Status: ON HOLD | COMMUNITY
End: 2017-04-01

## 2017-03-05 RX ORDER — EMTRICITABINE AND TENOFOVIR DISOPROXIL FUMARATE 200; 300 MG/1; MG/1
1 TABLET, FILM COATED ORAL DAILY
COMMUNITY

## 2017-03-05 RX ORDER — GABAPENTIN 300 MG/1
300 CAPSULE ORAL NIGHTLY
Status: ON HOLD | COMMUNITY
End: 2018-10-20

## 2017-03-05 RX ORDER — SERTRALINE HYDROCHLORIDE 25 MG/1
25 TABLET, FILM COATED ORAL DAILY
Status: ON HOLD | COMMUNITY
End: 2018-10-20

## 2017-03-05 RX ORDER — GABAPENTIN 100 MG/1
100 CAPSULE ORAL 2 TIMES DAILY
Status: ON HOLD | COMMUNITY
End: 2018-10-20

## 2017-03-05 RX ORDER — FLUCONAZOLE 200 MG/1
200 TABLET ORAL DAILY
Status: ON HOLD | COMMUNITY
End: 2017-04-07

## 2017-03-05 RX ORDER — ACETAMINOPHEN 325 MG/1
650 TABLET ORAL EVERY 6 HOURS PRN
Status: ON HOLD | COMMUNITY
End: 2017-04-07

## 2017-03-05 RX ORDER — SODIUM CHLORIDE 9 MG/ML
INJECTION, SOLUTION INTRAVENOUS ONCE
Status: COMPLETED | OUTPATIENT
Start: 2017-03-05 | End: 2017-03-05

## 2017-03-05 RX ORDER — ACETAMINOPHEN 500 MG
1000 TABLET ORAL ONCE
Status: COMPLETED | OUTPATIENT
Start: 2017-03-05 | End: 2017-03-05

## 2017-03-06 VITALS
TEMPERATURE: 99 F | RESPIRATION RATE: 18 BRPM | HEART RATE: 99 BPM | DIASTOLIC BLOOD PRESSURE: 71 MMHG | WEIGHT: 170.88 LBS | OXYGEN SATURATION: 93 % | SYSTOLIC BLOOD PRESSURE: 100 MMHG

## 2017-03-06 LAB
ALBUMIN SERPL BCP-MCNC: 1.8 G/DL (ref 3.5–4.8)
ALBUMIN/GLOB SERPL: 0.4 {RATIO} (ref 1–2)
ALP SERPL-CCNC: 51 U/L (ref 32–100)
ALT SERPL-CCNC: 41 U/L (ref 17–63)
ANION GAP SERPL CALC-SCNC: 4 MMOL/L (ref 0–18)
AST SERPL-CCNC: 87 U/L (ref 15–41)
BASOPHILS # BLD: 0 K/UL (ref 0–0.2)
BASOPHILS NFR BLD: 0 %
BILIRUB SERPL-MCNC: 0.8 MG/DL (ref 0.3–1.2)
BUN SERPL-MCNC: 17 MG/DL (ref 8–20)
BUN/CREAT SERPL: 17.5 (ref 10–20)
CALCIUM SERPL-MCNC: 7.9 MG/DL (ref 8.5–10.5)
CHLORIDE SERPL-SCNC: 103 MMOL/L (ref 95–110)
CO2 SERPL-SCNC: 26 MMOL/L (ref 22–32)
CREAT SERPL-MCNC: 0.97 MG/DL (ref 0.5–1.5)
EOSINOPHIL # BLD: 0 K/UL (ref 0–0.7)
EOSINOPHIL NFR BLD: 1 %
ERYTHROCYTE [DISTWIDTH] IN BLOOD BY AUTOMATED COUNT: 16.3 % (ref 11–15)
GLOBULIN PLAS-MCNC: 5 G/DL (ref 2.5–3.7)
GLUCOSE SERPL-MCNC: 90 MG/DL (ref 70–99)
HCT VFR BLD AUTO: 26.3 % (ref 41–52)
HGB BLD-MCNC: 8.7 G/DL (ref 13.5–17.5)
INR BLD: 1.3 (ref 0.9–1.2)
LYMPHOCYTES # BLD: 0.4 K/UL (ref 1–4)
LYMPHOCYTES NFR BLD: 14 %
MCH RBC QN AUTO: 27.2 PG (ref 27–32)
MCHC RBC AUTO-ENTMCNC: 32.9 G/DL (ref 32–37)
MCV RBC AUTO: 82.7 FL (ref 80–100)
METAMYELOCYTES # BLD MANUAL: 0.02 K/UL
METAMYELOCYTES NFR BLD: 1 %
METHADONE UR QL SCN: DETECTED
MONOCYTES # BLD: 0.5 K/UL (ref 0–1)
MONOCYTES NFR BLD: 21 %
MRSA DNA SPEC QL NAA+PROBE: NEGATIVE
NEUTROPHILS # BLD AUTO: 1.4 K/UL (ref 1.8–7.7)
NEUTROPHILS NFR BLD: 54 %
NEUTS BAND NFR BLD: 6 %
OPIATES UR QL SCN: DETECTED
OSMOLALITY UR CALC.SUM OF ELEC: 277 MOSM/KG (ref 275–295)
OXYCODONE UR QL SCN: DETECTED
PLATELET # BLD AUTO: 340 K/UL (ref 140–400)
PMV BLD AUTO: 6.9 FL (ref 7.4–10.3)
POTASSIUM SERPL-SCNC: 3.9 MMOL/L (ref 3.3–5.1)
PROCALCITONIN SERPL-MCNC: 0.07 NG/ML (ref ?–0.11)
PROT SERPL-MCNC: 6.8 G/DL (ref 5.9–8.4)
PROTHROMBIN TIME: 15.3 SECONDS (ref 11.8–14.5)
RBC # BLD AUTO: 3.19 M/UL (ref 4.5–5.9)
SODIUM SERPL-SCNC: 133 MMOL/L (ref 136–144)
VARIANT LYMPHS NFR BLD MANUAL: 3 %
WBC # BLD AUTO: 2.3 K/UL (ref 4–11)

## 2017-03-06 RX ORDER — OXYCODONE AND ACETAMINOPHEN 10; 325 MG/1; MG/1
1 TABLET ORAL EVERY 4 HOURS PRN
Status: DISCONTINUED | OUTPATIENT
Start: 2017-03-06 | End: 2017-03-11

## 2017-03-06 RX ORDER — GABAPENTIN 100 MG/1
100 CAPSULE ORAL 2 TIMES DAILY
Status: DISCONTINUED | OUTPATIENT
Start: 2017-03-06 | End: 2017-03-11

## 2017-03-06 RX ORDER — 0.9 % SODIUM CHLORIDE 0.9 %
VIAL (ML) INJECTION
Status: COMPLETED
Start: 2017-03-06 | End: 2017-03-06

## 2017-03-06 RX ORDER — HYDROMORPHONE HYDROCHLORIDE 1 MG/ML
1 INJECTION, SOLUTION INTRAMUSCULAR; INTRAVENOUS; SUBCUTANEOUS EVERY 2 HOUR PRN
Status: DISCONTINUED | OUTPATIENT
Start: 2017-03-06 | End: 2017-03-11

## 2017-03-06 RX ORDER — SODIUM CHLORIDE 9 MG/ML
INJECTION, SOLUTION INTRAVENOUS CONTINUOUS
Status: DISCONTINUED | OUTPATIENT
Start: 2017-03-06 | End: 2017-03-10

## 2017-03-06 RX ORDER — MELATONIN
325 2 TIMES DAILY WITH MEALS
Status: DISCONTINUED | OUTPATIENT
Start: 2017-03-06 | End: 2017-03-11

## 2017-03-06 RX ORDER — ONDANSETRON 2 MG/ML
4 INJECTION INTRAMUSCULAR; INTRAVENOUS EVERY 6 HOURS PRN
Status: DISCONTINUED | OUTPATIENT
Start: 2017-03-06 | End: 2017-03-11

## 2017-03-06 RX ORDER — HYDROMORPHONE HYDROCHLORIDE 1 MG/ML
0.5 INJECTION, SOLUTION INTRAMUSCULAR; INTRAVENOUS; SUBCUTANEOUS EVERY 2 HOUR PRN
Status: DISCONTINUED | OUTPATIENT
Start: 2017-03-06 | End: 2017-03-11

## 2017-03-06 RX ORDER — ENOXAPARIN SODIUM 100 MG/ML
1 INJECTION SUBCUTANEOUS EVERY 12 HOURS SCHEDULED
Status: DISCONTINUED | OUTPATIENT
Start: 2017-03-06 | End: 2017-03-11

## 2017-03-06 RX ORDER — ACETAMINOPHEN 325 MG/1
650 TABLET ORAL EVERY 6 HOURS PRN
Status: DISCONTINUED | OUTPATIENT
Start: 2017-03-06 | End: 2017-03-11

## 2017-03-06 RX ORDER — GABAPENTIN 300 MG/1
300 CAPSULE ORAL NIGHTLY
Status: DISCONTINUED | OUTPATIENT
Start: 2017-03-06 | End: 2017-03-11

## 2017-03-06 RX ORDER — METOPROLOL SUCCINATE 25 MG/1
25 TABLET, EXTENDED RELEASE ORAL
Status: DISCONTINUED | OUTPATIENT
Start: 2017-03-06 | End: 2017-03-11

## 2017-03-06 NOTE — ED NOTES
Patient is ready for transfer to [facility] for continuation of care. [facility] contacted for transport, spoke with [facility] was approved. Approval number is [id number]. [address] contacted for ALS transport.  ETA is 45 min

## 2017-03-06 NOTE — PROGRESS NOTES
[facility] [facility] - [facility]    Progress Note    Alpesh Kahn Patient Status:  Inpatient    3/31/1965 MRN [id number]   Location [facility] 5SW/SE Attending Angel Barrios MD   Hosp Day # 1 PCP No primary care provider on file.      Subjective: 03/06/2017   HCT 26.3* 03/06/2017    03/06/2017   CREATSERUM 0.97 03/06/2017   BUN 17 03/06/2017   * 03/06/2017   K 3.9 03/06/2017    03/06/2017   CO2 26 03/06/2017   GLU 90 03/06/2017   CA 7.9* 03/06/2017   ALB 1.8* 03/06/2017   ALKPHO

## 2017-03-06 NOTE — ED PROVIDER NOTES
Patient Seen in: [facility] Emergency Department    History   Patient presents with:  Fever Sepsis (infectious)    Stated Complaint: fever    HPI    Patient was admitted to [facility] on 15 February and discharged on 22 February.   He has a hi ritonavir (NORVIR) 100 MG Oral Cap,  Take 100 mg by mouth daily. Atazanavir Sulfate (REYATAZ) 300 MG Oral Cap,  Take 300 mg by mouth daily with breakfast.   Sertraline HCl 25 MG Oral Tab,  Take 25 mg by mouth daily.    Methadone HCl 5 MG Oral Tab,  Take 5 air)       Current:/69 mmHg  Pulse 102  Temp(Src) 100.4 °F (38 °C) (Temporal)  Resp 18  Wt 77.52 kg  SpO2 95%        Physical Exam  General: The patient is awake and alert. He is tremulous.   He will speak his name clearly and answer some other simpl created for panel order CBC WITH DIFFERENTIAL WITH PLATELET.   Procedure                               Abnormality         Status                     ---------                               -----------         ------                     CBC W/ DIFFERENTIAL[ administered:  BP: 103/69 mmHg ; Pulse: 102 ; Resp: 18  Oxygen Therapy - SpO2: 95 % ; O2 Device: Nasal cannula     Disposition and Plan     Clinical Impression:  Fever in adult  (primary encounter diagnosis)  Streptococcal arthritis of right hip ([address]  Ane

## 2017-03-06 NOTE — CONSULTS
Sandra Band   [address] [place] [address]  TEL: [phone]  FAX: [phone]    Kareem Shafer Patient Status:  Inpatient    3/31/1965 MRN [id number]   Location [facility] 5SW/SE Attending Mignon Wang MD   Hosp Day # 1 PCP No primary sulfate EC tab 325 mg, 325 mg, Oral, BID with meals  •  gabapentin (NEURONTIN) cap 100 mg, 100 mg, Oral, BID  •  gabapentin (NEURONTIN) cap 300 mg, 300 mg, Oral, Nightly  •  Metoprolol Succinate ER (Toprol XL) 24 hr tab 25 mg, 25 mg, Oral, 2x Daily(Beta Bl No hand swelling. Psychiatric: Appropriate mood and affect. Neurologic: No focal neurological deficits.     Laboratory Data:    Lab Results  Component Value Date   WBC 2.3 03/06/2017   HGB 8.7 03/06/2017   HCT 26.3 03/06/2017    03/06/2017   CREAT weeks.    Thank you for allowing me to participate in the care of your patient.     Troy Isidro  3/6/2017  2:46 PM

## 2017-03-06 NOTE — PROGRESS NOTES
[facility] dosing service    Initial Pharmacokinetic Consult for Vancomycin Dosing     Gin Brooks is a 45year old male admitted on 3/6 who is being treated for bacteremia.   Pharmacy has been asked to dose Vancomycin by Dr. Grecia Brody    He has No Known YONG Boyd  Pharmacy Extension: [phone]

## 2017-03-06 NOTE — PLAN OF CARE
Altered Communication/Language Barrier    • Patient/Family is able to understand and participate in their care Not Progressing        DISCHARGE PLANNING    • Discharge to home or other facility with appropriate resources Not Progressing        Impaired Act

## 2017-03-06 NOTE — PAYOR COMM NOTE
Annalisae Day #[id number]  (47 year old M)       [facility] [id number]         Margaux Parikh MD Physician Signed  H&P 3/6/2017  6:36 AM      Expand All Collapse All    [facility]  Patient Medications:  Prior to Admission Medications    Prescriptions  Last Dose  Informant  Patient Reported?  Taking?    Cyclobenzaprine HCl 10 MG Oral Tab  Unknown at Unknown time    Yes  No    Sig: Take 10 mg by mouth 3 (three) times daily as needed for Muscle Negative  Gastrointestinal:  Negative. Genitourinary:  Negative  Endocrine:  Negative. Immunologic:  Negative. Musculoskeletal: Right hip pain  Integumentary:  Negative. Neurologic:  Negative. Psychiatric:  Negative.   ROS reviewed as documented in [place] 0.8  03/06/2017    TP  6.8  03/06/2017    AST  87  03/06/2017    ALT  41  03/06/2017    INR  1.3  03/06/2017        Imaging:  X-ray right hip  Destructive changes right femoral head with neck of femur abutting against acetabular lip      Assessment and Delmy

## 2017-03-06 NOTE — CONSULTS
Seen at [address] [place] [address] on 3/3- chart reviewed; genotype shows no mutations, restarted on Truvada/Reyataz/Norvir.   Blood cx x 1 set CNS, repeat from picc; was notified repeat again positive, [facility] ED on 3/5, blood cultures repeat and given vanco

## 2017-03-06 NOTE — ED INITIAL ASSESSMENT (HPI)
Referral from local NH due to persistent fever for past few days, currently on IABT for R hip infection s/p R hip replacement

## 2017-03-06 NOTE — H&P
[address] [place] Patient Status:  Inpatient    3/31/1965 MRN [id number]   Location [facility] 5SW/SE Attending Nino Wolfe MD   Hosp Day # 1 PCP No primary care provider on file.      Date: Sodium 80 MG/0.8ML Subcutaneous Solution 3/5/2017 at Unknown time  No Yes   Sig: Inject 0.8 mL (80 mg total) into the skin every 12 (twelve) hours.    Metoprolol Succinate ER 25 MG Oral Tablet 24 Hr 3/5/2017 at Unknown time  No Yes   Sig: Take 1 tablet (25 [100-106] 106  Resp:  [16-18] 18  BP: (103-115)/(54-61) 103/54 mmHg    General: Lethargic but easily arousable. Diffuse skin problem:  None. Eye:  Pupils are equal, round and reactive to light, extraocular movements are intact, Normal conjunctiva.   HENT: Blood cultures pending at [facility].    Right lower extremity DVT  Continue Lovenox every 12 hours    Right hip hematoma  Appears unchanged    HIV positive  Currently not on medication secondary to poor compliance, advised to follow-up at HIV clinic

## 2017-03-06 NOTE — PHYSICAL THERAPY NOTE
PHYSICAL THERAPY EVALUATION - INPATIENT     Room Number: 563/563-A  Evaluation Date: 3/6/2017  Type of Evaluation: Initial  Physician Order: PT Eval and Treat    Presenting Problem: fevers    hx of septic arthritis right hip  recent  I and D last admis at end of session  And  Reports to therapy --- plans now to reconsult ortho this admission for follow up -- Adam LARKIN also approves therapy at this time. THerapy   Will await ortho consult and assess any change in WB after ortho consult.     Pt with spencer not making sense in response to questions   Pt orientated to place able to follow one step commands      PHYSICAL THERAPY EXAMINATION     OBJECTIVE  Precautions: Bed/chair alarm;Limb alert - right  Fall Risk: High fall risk    WEIGHT BEARING RESTRICTION  W difficulty does the patient currently have. ..  -   Turning over in bed (including adjusting bedclothes, sheets and blankets)?: A Lot   -   Sitting down on and standing up from a chair with arms (e.g., wheelchair, bedside commode, etc.): A Lot   -   Moving assistance with RW      Goal #2  Current Status    Goal #3 Assess amb as approp       Goal #3   Current Status    Goal #4    Goal #4   Current Status    Goal #5 Patient to demonstrate independence with home activity/exercise instructions provided to siddhartha

## 2017-03-06 NOTE — OCCUPATIONAL THERAPY NOTE
OCCUPATIONAL THERAPY EVALUATION - INPATIENT     Room Number: 563/563-A  Evaluation Date: 3/6/2017  Type of Evaluation: Initial  Presenting Problem:  (Fever)    Physician Order: IP Consult to Occupational Therapy  Reason for Therapy: ADL/IADL Dysfunction an • Asthma    • Arthritis    • HIV (human immunodeficiency virus infection) ([facility] [address]    • Arrhythmia    • Osteoarthritis    • High cholesterol    • High blood pressure        Past Surgical History      Past Surgical History    REMOVAL OF LUNG,LOBECTOMY      H personal grooming such as brushing teeth?: A Little  -   Eating meals?: A Little    AM-PAC Score:  Score: 14  Approx Degree of Impairment: 59.67%  Standardized Score (AM-PAC Scale): 33.39  CMS Modifier (G-Code): CK    FUNCTIONAL TRANSFER ASSESSMENT  Supine

## 2017-03-07 LAB
ALBUMIN SERPL BCP-MCNC: 1.6 G/DL (ref 3.5–4.8)
ALBUMIN/GLOB SERPL: 0.3 {RATIO} (ref 1–2)
ALP SERPL-CCNC: 49 U/L (ref 32–100)
ALT SERPL-CCNC: 36 U/L (ref 17–63)
ANION GAP SERPL CALC-SCNC: 3 MMOL/L (ref 0–18)
AST SERPL-CCNC: 71 U/L (ref 15–41)
BASOPHILS # BLD: 0 K/UL (ref 0–0.2)
BASOPHILS NFR BLD: 0 %
BILIRUB SERPL-MCNC: 0.6 MG/DL (ref 0.3–1.2)
BILIRUB UR QL: NEGATIVE
BUN SERPL-MCNC: 9 MG/DL (ref 8–20)
BUN/CREAT SERPL: 13.6 (ref 10–20)
CALCIUM SERPL-MCNC: 7.8 MG/DL (ref 8.5–10.5)
CHLORIDE SERPL-SCNC: 106 MMOL/L (ref 95–110)
CLARITY UR: CLEAR
CO2 SERPL-SCNC: 25 MMOL/L (ref 22–32)
COLOR UR: YELLOW
CREAT SERPL-MCNC: 0.66 MG/DL (ref 0.5–1.5)
EOSINOPHIL # BLD: 0 K/UL (ref 0–0.7)
EOSINOPHIL NFR BLD: 2 %
ERYTHROCYTE [DISTWIDTH] IN BLOOD BY AUTOMATED COUNT: 15.7 % (ref 11–15)
GLOBULIN PLAS-MCNC: 4.8 G/DL (ref 2.5–3.7)
GLUCOSE SERPL-MCNC: 82 MG/DL (ref 70–99)
GLUCOSE UR-MCNC: NEGATIVE MG/DL
HCT VFR BLD AUTO: 25.4 % (ref 41–52)
HGB BLD-MCNC: 8.4 G/DL (ref 13.5–17.5)
KETONES UR-MCNC: NEGATIVE MG/DL
LEUKOCYTE ESTERASE UR QL STRIP.AUTO: NEGATIVE
LYMPHOCYTES # BLD: 0.1 K/UL (ref 1–4)
LYMPHOCYTES NFR BLD: 4 %
MCH RBC QN AUTO: 27.6 PG (ref 27–32)
MCHC RBC AUTO-ENTMCNC: 33.3 G/DL (ref 32–37)
MCV RBC AUTO: 82.8 FL (ref 80–100)
METAMYELOCYTES # BLD MANUAL: 0.02 K/UL
METAMYELOCYTES NFR BLD: 1 %
MONOCYTES # BLD: 0.3 K/UL (ref 0–1)
MONOCYTES NFR BLD: 14 %
NEUTROPHILS # BLD AUTO: 1.5 K/UL (ref 1.8–7.7)
NEUTROPHILS NFR BLD: 70 %
NEUTS BAND NFR BLD: 9 %
NITRITE UR QL STRIP.AUTO: NEGATIVE
OSMOLALITY UR CALC.SUM OF ELEC: 276 MOSM/KG (ref 275–295)
PH UR: 6 [PH] (ref 5–8)
PLATELET # BLD AUTO: 322 K/UL (ref 140–400)
PMV BLD AUTO: 6.8 FL (ref 7.4–10.3)
POTASSIUM SERPL-SCNC: 3.8 MMOL/L (ref 3.3–5.1)
PROT SERPL-MCNC: 6.4 G/DL (ref 5.9–8.4)
PROT UR-MCNC: NEGATIVE MG/DL
RBC # BLD AUTO: 3.06 M/UL (ref 4.5–5.9)
RBC #/AREA URNS AUTO: 23 /HPF
SODIUM SERPL-SCNC: 134 MMOL/L (ref 136–144)
SP GR UR STRIP: 1.02 (ref 1–1.03)
UROBILINOGEN UR STRIP-ACNC: 4
VANCOMYCIN TROUGH SERPL-MCNC: 12.7 MCG/ML (ref 10–20)
VIT C UR-MCNC: NEGATIVE MG/DL
WBC # BLD AUTO: 1.9 K/UL (ref 4–11)
WBC #/AREA URNS AUTO: 2 /HPF

## 2017-03-07 PROCEDURE — 99233 SBSQ HOSP IP/OBS HIGH 50: CPT | Performed by: HOSPITALIST

## 2017-03-07 RX ORDER — EMTRICITABINE AND TENOFOVIR DISOPROXIL FUMARATE 200; 300 MG/1; MG/1
1 TABLET, FILM COATED ORAL DAILY
Status: DISCONTINUED | OUTPATIENT
Start: 2017-03-07 | End: 2017-03-11

## 2017-03-07 RX ORDER — ATAZANAVIR 300 MG/1
300 CAPSULE ORAL
Status: DISCONTINUED | OUTPATIENT
Start: 2017-03-08 | End: 2017-03-11

## 2017-03-07 RX ORDER — POTASSIUM CHLORIDE 20 MEQ/1
40 TABLET, EXTENDED RELEASE ORAL ONCE
Status: COMPLETED | OUTPATIENT
Start: 2017-03-07 | End: 2017-03-07

## 2017-03-07 RX ORDER — RITONAVIR 100 MG/1
100 TABLET ORAL DAILY
Status: DISCONTINUED | OUTPATIENT
Start: 2017-03-07 | End: 2017-03-11

## 2017-03-07 RX ORDER — 0.9 % SODIUM CHLORIDE 0.9 %
10 VIAL (ML) INJECTION AS NEEDED
Status: DISCONTINUED | OUTPATIENT
Start: 2017-03-07 | End: 2017-03-11

## 2017-03-07 RX ORDER — 0.9 % SODIUM CHLORIDE 0.9 %
VIAL (ML) INJECTION
Status: COMPLETED
Start: 2017-03-07 | End: 2017-03-07
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
## 2017-03-07 NOTE — PLAN OF CARE
Altered Communication/Language Barrier    • Patient/Family is able to understand and participate in their care Progressing        DISCHARGE PLANNING    • Discharge to home or other facility with appropriate resources Progressing        Impaired Activities

## 2017-03-07 NOTE — DISCHARGE PLANNING
3:35pm Update- SW placed call to [facility] for the hospital network. RONAK relayed this information to the APN. No Sulema, Geri Tan. In network: U laith MUÑOZ (Cuba Foley), Beltran Larson.  Barbara Munguia [place], [place]

## 2017-03-07 NOTE — PROGRESS NOTES
[facility]    Progress Note    Cherie Leilamarisela Patient Status:  Inpatient    3/31/1965 MRN [id number]   Location [facility] 5SW/SE Attending Gracy Liang MD   Hosp Day # 2 PCP No primary care provider on file.      Subjective: Lovenox    CODE STATUS  Full    Dispo: pending, possible transfer to [facility].     Results:       Lab Results  Component Value Date   WBC 1.9* 03/07/2017   HGB 8.4* 03/07/2017   HCT 25.4* 03/07/2017    03/07/2017   CREATSERUM 0.66 03/07/2

## 2017-03-07 NOTE — PHYSICAL THERAPY NOTE
PHYSICAL THERAPY TREATMENT NOTE - INPATIENT    Room Number: 555/555-A       Presenting Problem: fevers    hx of septic arthritis right hip  recent  I and D last admission    PMH HIV  ID is following pt      Problem List  Active Problems:    * No active ho ----poor posture in stand ---stood briefly with need to return to sit due to pain and weakness. PT will follow progressing as approp. D/c plans presently now for possible transfer to another hospital for care ---pending acute management .       Pt w (e.g., wheelchair, bedside commode, etc.): A Lot   -   Moving from lying on back to sitting on the side of the bed?: A Lot   How much help from another person does the patient currently need. ..   -   Moving to and from a bed to a chair (including a wheelch stated for d/c   To get to chair for this visit     Goal #1  Patient is able to demonstrate supine - sit EOB @ level:CGA       Goal #1    Current Status   max assist cues needed extra time    Goal #2  Patient is able to demonstrate transfers EOB to/from Ch

## 2017-03-07 NOTE — PROGRESS NOTES
[facility] [place] [facility]    [facility]    Carrillo Amaral Patient Status:  Inpatient    3/31/1965 MRN [id number]   Location [facility] 5SW/SE Attending Chrissy Goode MD   Hosp Day # 2 PCP No primary care provider on file.      Subjective:  R h Intravenous Q24H   • vancomycin  1.25 g Intravenous Q12H      PRN Meds: Sodium Chloride, acetaminophen, oxyCODONE-acetaminophen, HYDROmorphone HCl PF **OR** HYDROmorphone HCl PF, ondansetron HCl     Assessment and Plan:     Septic arthritis.  Recent dischar

## 2017-03-08 LAB
ALBUMIN SERPL BCP-MCNC: 1.4 G/DL (ref 3.5–4.8)
ALBUMIN/GLOB SERPL: 0.3 {RATIO} (ref 1–2)
ALP SERPL-CCNC: 52 U/L (ref 32–100)
ALT SERPL-CCNC: 37 U/L (ref 17–63)
ANION GAP SERPL CALC-SCNC: 2 MMOL/L (ref 0–18)
AST SERPL-CCNC: 68 U/L (ref 15–41)
BASOPHILS # BLD: 0 K/UL (ref 0–0.2)
BASOPHILS NFR BLD: 1 %
BILIRUB SERPL-MCNC: 0.6 MG/DL (ref 0.3–1.2)
BUN SERPL-MCNC: 7 MG/DL (ref 8–20)
BUN/CREAT SERPL: 10.6 (ref 10–20)
CALCIUM SERPL-MCNC: 7.6 MG/DL (ref 8.5–10.5)
CHLORIDE SERPL-SCNC: 106 MMOL/L (ref 95–110)
CO2 SERPL-SCNC: 25 MMOL/L (ref 22–32)
CREAT SERPL-MCNC: 0.66 MG/DL (ref 0.5–1.5)
EOSINOPHIL # BLD: 0.1 K/UL (ref 0–0.7)
EOSINOPHIL NFR BLD: 6 %
ERYTHROCYTE [DISTWIDTH] IN BLOOD BY AUTOMATED COUNT: 15.8 % (ref 11–15)
GLOBULIN PLAS-MCNC: 4.5 G/DL (ref 2.5–3.7)
GLUCOSE SERPL-MCNC: 92 MG/DL (ref 70–99)
HCT VFR BLD AUTO: 25 % (ref 41–52)
HGB BLD-MCNC: 8.3 G/DL (ref 13.5–17.5)
LYMPHOCYTES # BLD: 0.4 K/UL (ref 1–4)
LYMPHOCYTES NFR BLD: 22 %
MCH RBC QN AUTO: 27.4 PG (ref 27–32)
MCHC RBC AUTO-ENTMCNC: 33.1 G/DL (ref 32–37)
MCV RBC AUTO: 82.8 FL (ref 80–100)
MONOCYTES # BLD: 0.4 K/UL (ref 0–1)
MONOCYTES NFR BLD: 19 %
NEUTROPHILS # BLD AUTO: 1 K/UL (ref 1.8–7.7)
NEUTROPHILS NFR BLD: 52 %
OSMOLALITY UR CALC.SUM OF ELEC: 274 MOSM/KG (ref 275–295)
PLATELET # BLD AUTO: 328 K/UL (ref 140–400)
PMV BLD AUTO: 6.8 FL (ref 7.4–10.3)
POTASSIUM SERPL-SCNC: 4.1 MMOL/L (ref 3.3–5.1)
POTASSIUM SERPL-SCNC: 4.1 MMOL/L (ref 3.3–5.1)
PROT SERPL-MCNC: 5.9 G/DL (ref 5.9–8.4)
RBC # BLD AUTO: 3.02 M/UL (ref 4.5–5.9)
SODIUM SERPL-SCNC: 133 MMOL/L (ref 136–144)
WBC # BLD AUTO: 1.9 K/UL (ref 4–11)

## 2017-03-08 PROCEDURE — 99233 SBSQ HOSP IP/OBS HIGH 50: CPT | Performed by: HOSPITALIST

## 2017-03-08 RX ORDER — EMTRICITABINE AND TENOFOVIR DISOPROXIL FUMARATE 200; 300 MG/1; MG/1
1 TABLET, FILM COATED ORAL DAILY
Refills: 0 | Status: ON HOLD | COMMUNITY
Start: 2017-03-08 | End: 2017-04-07

## 2017-03-08 RX ORDER — 0.9 % SODIUM CHLORIDE 0.9 %
VIAL (ML) INJECTION
Status: COMPLETED
Start: 2017-03-08 | End: 2017-03-08

## 2017-03-08 RX ORDER — RITONAVIR 100 MG/1
100 TABLET ORAL DAILY
Refills: 0 | Status: ON HOLD | COMMUNITY
Start: 2017-03-08 | End: 2017-04-07

## 2017-03-08 RX ORDER — ATAZANAVIR 300 MG/1
300 CAPSULE ORAL
Refills: 0 | Status: ON HOLD | COMMUNITY
Start: 2017-03-08 | End: 2017-04-07

## 2017-03-08 RX ORDER — ADENOSINE 3 MG/ML
6 INJECTION, SOLUTION INTRAVENOUS ONCE
Status: COMPLETED | OUTPATIENT
Start: 2017-03-08 | End: 2017-03-08

## 2017-03-08 NOTE — OCCUPATIONAL THERAPY NOTE
Attempted to see pt for OT tx. Pt initially agreeable to tx. Pt then refusing when right leg is gently moved. Pt reporting a lot of pain in right leg. RN aware and reports pt may be transferred to another hospital today. Will continue to follow.

## 2017-03-08 NOTE — PROGRESS NOTES
[place] [facility]    Progress Note    Martín Campa Patient Status:  Inpatient    3/31/1965 MRN [id number]   Location [facility] 5SW/SE Attending Shirley Asher MD   Hosp Day # 3 PCP No primary care provider on file.      Subjective: supplements.     Prophylaxis  Therapeutic Lovenox    CODE STATUS  Full    Dispo: pending, possible transfer to [facility].     Results:       Lab Results  Component Value Date   WBC 1.9* 03/08/2017   HGB 8.3* 03/08/2017   HCT 25.0* 03/08/2017   PL

## 2017-03-08 NOTE — CONSULTS
[facility]    PATIENT'S NAME: [facility] PHYSICIAN: Jayla Patiño MD   CONSULTING PHYSICIAN: Annie Alvarez MD   PATIENT ACCOUNT#:   [id number]    LOCATION:  [address] [place] [address] #:   [id number]       DATE OF BIRTH:  [birth year] Hematology had been consulted as well as Infectious Disease. A PICC line had been placed in the right upper extremity. At the nursing home, he was maintained on IV antibiotics. He had possibility of mental status changes.   According to his wife, he ma Hemoglobin is approximately 8.8 and stable. On previous admission, because of immunosuppression, he did not mount an increased reticulocyte count. He was iron deficient. His electrolytes had been within normal limits. AST 73. His albumin is 1.9.   His Currently on Lovenox. Right lower extremity is resolving. 11.   Elevated pro time to 15.3 with an INR of 1.3. No evidence of any active bleeding. Swelling to the right lower extremity is resolving.     PLAN:    1.   IV antibiotics as per Dr. Omi Webster an sitting much more comfortably up in a chair, something he has never been able to do before.   9.   We will eventually be signing off on this case, allowing the hip specialist who specialize in hip reconstruction, possible total hip arthroplasty, in the pres

## 2017-03-08 NOTE — PROGRESS NOTES
[facility]    Progress Note    Fang Every Patient Status:  Inpatient    3/31/1965 MRN [id number]   Location [facility] 5SW/SE Attending Nori Balderrama,  [facility] Day # 3 PCP No primary care provider on file.        Subjecti every 12 hours    Right hip hematoma  - Appears unchanged  - continues to have pain and right lower abd pain/swelling  - percocet prn    HIV positive  - cont on norvir, truvada, rayataz  - ID following    Chronic anemia  - Close to baseline, no signs of ac

## 2017-03-08 NOTE — PROGRESS NOTES
[facility] dosing service    Follow-up Pharmacokinetic Consult for Vancomycin Dosing     Carrillo Amaral is a 45year old male admitted on 3/6 who is being treated for bacteremia. Patient is on day 2 of Vancomycin 1.25 gm IV Q 12 hours.   Goal trough

## 2017-03-08 NOTE — PHYSICAL THERAPY NOTE
AM: Attempted to work with pt but he reports that he is having pain in his R leg, that he needs to use the washroom. Wanted to assist pt to the washroom but he grabbed his bedside urinal and proceeded to use it.     Therapist waited about 10 minutes but pt

## 2017-03-08 NOTE — PHYSICAL THERAPY NOTE
PM: Spoke with RN, Lara Silvestre, who stated that she just gave patient pain medication and to try therapy a little later this afternoon. Will attempt as schedule allows. Pt may be transferred to different hospital tomorrow.

## 2017-03-08 NOTE — CONSULTS
[place] [address]  TEL: [phone]  FAX: [phone]    Jelly Diaz Patient Status:  Inpatient    3/31/1965 MRN [id number]   Location [facility] 5SW/SE Attending Delia Jones MD   Hosp Day # 3 PCP No primary CefTRIAXone Sodium (ROCEPHIN) 1 g in sodium chloride 0.9 % 100 mL IVPB-minibag, 1 g, Intravenous, Q24H  •  oxyCODONE-acetaminophen (PERCOCET)  MG per tab 1 tablet, 1 tablet, Oral, Q4H PRN  •  HYDROmorphone HCl PF (DILAUDID) 1 MG/ML injection 0.5 mg, 5.9 03/08/2017   AST 68 03/08/2017   ALT 37 03/08/2017       Microbiologic Data:     Reviewed in EMR    Imaging:  X-Ray    Imaging results reviewed     Impression:  Patient Active Problem List:     Hip pain, acute     Hip pain, acute, right     Hip joint e

## 2017-03-08 NOTE — CONSULTS
Brody Palomo   [address] [place] [address]  TEL: [phone]  FAX: [phone]    Erenest Query Patient Status:  Inpatient    3/31/1965 MRN [id number]   Location [facility] 5SW/SE Attending Geraldine Keith MD   Hosp Day # 2 PCP No primary 1 tablet, 1 tablet, Oral, Q4H PRN  •  HYDROmorphone HCl PF (DILAUDID) 1 MG/ML injection 0.5 mg, 0.5 mg, Intravenous, Q2H PRN **OR** HYDROmorphone HCl PF (DILAUDID) 1 MG/ML injection 1 mg, 1 mg, Intravenous, Q2H PRN  •  ondansetron HCl (ZOFRAN) injection 4 Active Problem List:     Hip pain, acute     Hip pain, acute, right     Hip joint effusion, right     Penile edema     Streptococcal arthritis of right hip (HCC)     IV drug abuse     Non compliance w medication regimen     Acute deep vein thrombosis (DVT)

## 2017-03-08 NOTE — PLAN OF CARE
Problem: Patient/Family Goals  Goal: Patient/Family Long Term Goal  Patient’s Long Term Goal: to be pain free.     Interventions:  - Pain management  - IV antibiotics/medications as prescribed  - See additional Care Plan goals for specific interventions   O without S/S of infection  INTERVENTIONS:  - Assess and document risk factors for pressure ulcer development  - Assess and document skin integrity  - Assess and document dressing/incision, wound bed, drain sites and surrounding tissue  - Implement wound car care  Interventions:  - Assess ability and encourage patient to participate in ADLs to maximize function  - Promote sitting position while performing ADLs such as feeding, grooming, and bathing  - Educate and encourage patient/family in tolerated functiona plan reviewed with patient. Bed alarm on. Frequent rounding implemented. Call light within reach.      Problem: DISCHARGE PLANNING  Goal: Discharge to home or other facility with appropriate resources  INTERVENTIONS:  - Identify barriers to discharge w/pt a to patient/family  - Incorporate patient and family knowledge, values, beliefs, and cultural backgrounds into the planning and delivery of care  - Encourage patient/family to participate in care and decision-making at the level they choose  - Honor patient

## 2017-03-08 NOTE — PLAN OF CARE
Problem: Patient/Family Goals  Goal: Patient/Family Long Term Goal  Patient’s Long Term Goal: to be pain free.     Interventions:  - Pain management  - IV antibiotics/medications as prescribed  - See additional Care Plan goals for specific interventions   O algorithm/standards of care as needed   Outcome: Progressing  No new skin breakdown noted.   Goal: Incision(s), wounds(s) or drain site(s) healing without S/S of infection  INTERVENTIONS:  - Assess and document risk factors for pressure ulcer development  - and engage patient/family in tolerated activity level and precautions   Outcome: Not Progressing    Problem: Impaired Activities of Daily Living  Goal: Achieve highest/safest level of independence in self care  Interventions:  - Assess ability and encourag Educate pt/family on patient safety including physical limitations  - Instruct pt to call for assistance with activity based on assessment  - Modify environment to reduce risk of injury  - Provide assistive devices as appropriate  - Consider OT/PT consult patient   Outcome: Progressing  Pt's wife is the only visitor allowed to see him.     Problem: Patient Centered Care  Goal: Patient preferences are identified and integrated in the patient’s plan of care  Interventions:  - What would you like us to know as

## 2017-03-08 NOTE — CM/SW NOTE
CTL contacted CHAYA Ernst [phone] regarding progression of care and transfer to [address] informed CTL that currently there are no beds at [facility]; awaiting call back from [facility] transfer center.   Dr. Vincent Black is the transferring

## 2017-03-09 LAB
ANION GAP SERPL CALC-SCNC: 2 MMOL/L (ref 0–18)
BASOPHILS # BLD: 0 K/UL (ref 0–0.2)
BASOPHILS NFR BLD: 1 %
BUN SERPL-MCNC: 5 MG/DL (ref 8–20)
BUN/CREAT SERPL: 8.2 (ref 10–20)
CALCIUM SERPL-MCNC: 7.3 MG/DL (ref 8.5–10.5)
CHLORIDE SERPL-SCNC: 108 MMOL/L (ref 95–110)
CO2 SERPL-SCNC: 27 MMOL/L (ref 22–32)
CREAT SERPL-MCNC: 0.61 MG/DL (ref 0.5–1.5)
EOSINOPHIL # BLD: 0.1 K/UL (ref 0–0.7)
EOSINOPHIL NFR BLD: 3 %
ERYTHROCYTE [DISTWIDTH] IN BLOOD BY AUTOMATED COUNT: 15.8 % (ref 11–15)
GLUCOSE SERPL-MCNC: 95 MG/DL (ref 70–99)
HCT VFR BLD AUTO: 24.9 % (ref 41–52)
HGB BLD-MCNC: 8.3 G/DL (ref 13.5–17.5)
LYMPHOCYTES # BLD: 0.5 K/UL (ref 1–4)
LYMPHOCYTES NFR BLD: 21 %
MCH RBC QN AUTO: 27.1 PG (ref 27–32)
MCHC RBC AUTO-ENTMCNC: 33.3 G/DL (ref 32–37)
MCV RBC AUTO: 81.4 FL (ref 80–100)
MONOCYTES # BLD: 0.4 K/UL (ref 0–1)
MONOCYTES NFR BLD: 16 %
NEUTROPHILS # BLD AUTO: 1.4 K/UL (ref 1.8–7.7)
NEUTROPHILS NFR BLD: 59 %
OSMOLALITY UR CALC.SUM OF ELEC: 281 MOSM/KG (ref 275–295)
PLATELET # BLD AUTO: 345 K/UL (ref 140–400)
PMV BLD AUTO: 6.6 FL (ref 7.4–10.3)
POTASSIUM SERPL-SCNC: 3.7 MMOL/L (ref 3.3–5.1)
RBC # BLD AUTO: 3.06 M/UL (ref 4.5–5.9)
SODIUM SERPL-SCNC: 137 MMOL/L (ref 136–144)
VANCOMYCIN TROUGH SERPL-MCNC: 12.7 MCG/ML (ref 10–20)
WBC # BLD AUTO: 2.3 K/UL (ref 4–11)

## 2017-03-09 PROCEDURE — 99233 SBSQ HOSP IP/OBS HIGH 50: CPT | Performed by: HOSPITALIST

## 2017-03-09 RX ORDER — POTASSIUM CHLORIDE 20 MEQ/1
40 TABLET, EXTENDED RELEASE ORAL ONCE
Status: COMPLETED | OUTPATIENT
Start: 2017-03-09 | End: 2017-03-09

## 2017-03-09 NOTE — PROGRESS NOTES
Dr Anitha Rubio / ICU on call   RRT   RRT called for palpitation / pt with heart rate 216   Pt feels palpitation  But no cp or sob   He is awake and alert   BP 88/56 RR 18 sat 96 % temp 97.3 and heart rate 216   Neck no jvd   Chest distant with few crackles in

## 2017-03-09 NOTE — HISTORICAL OFFICE NOTE
Mary List  [phone]  : 1965  ACCOUNT:  [id number]  CARDIOLOGIST: Zina Kawasaki, M.D.   Hospital: [facility]   Admitted: 02/15/2017  Cardiology signed off: 2017    DISCHARGE SUMMARY    HOSPITAL COURSE: The patient presented to the

## 2017-03-09 NOTE — PROGRESS NOTES
[facility] [facility] - [facility]    Progress Note    Devyn Diego Patient Status:  Inpatient    3/31/1965 MRN [id number]   Location [facility] 5SW/SE Attending Antonella Bowers,  [facility]  Day # 4 PCP No primary care provider on file.        Subjecti fever and mental status  - transfer to [facility] for potentially greater reconstruction of the right hip    SVT last night. Converted to NSR with adenosine.    Pt also with hypotension.  - cardiology consult  - cont metoprolol  - give IVF at 100 ml

## 2017-03-09 NOTE — RESPIRATORY THERAPY NOTE
Respiratory Therapy Rapid Response Note:    Patient assessment:    RRT called for respiratory distress? no  Breathing pattern: Normal  Patiently currently being seen by Respiratory Care? yes    RT interventions necessary? no    Oxygen applied/increased? no

## 2017-03-09 NOTE — PHYSICAL THERAPY NOTE
Pt refused to amb or transfer from bed to chair due to pain in R hip. Will attempt pt again tomorrow if appropriate.

## 2017-03-09 NOTE — PLAN OF CARE
Problem: Patient/Family Goals  Goal: Patient/Family Long Term Goal  Patient’s Long Term Goal: to be pain free.     Interventions:  - Pain management  - IV antibiotics/medications as prescribed  - See additional Care Plan goals for specific interventions   O integrity  - Assess and document dressing/incision, wound bed, drain sites and surrounding tissue  - Implement wound care per orders  - Initiate isolation precautions as appropriate  - Initiate Pressure Ulcer prevention bundle as indicated   Outcome: Not P Living  Goal: Achieve highest/safest level of independence in self care  Interventions:  - Assess ability and encourage patient to participate in ADLs to maximize function  - Promote sitting position while performing ADLs such as feeding, grooming, and bat PLANNING  Goal: Discharge to home or other facility with appropriate resources  INTERVENTIONS:  - Identify barriers to discharge w/pt and caregiver  - Include patient/family/discharge partner in discharge planning  - Arrange for needed discharge resources the level they choose  - Honor patient and family perspectives and choices   Outcome: Progressing

## 2017-03-09 NOTE — CONSULTS
Bianca Barroso   [address] [place] [address]  TEL: [phone]  FAX: [phone]    Kimberley Saleh Patient Status:  Inpatient    3/31/1965 MRN [id number]   Location [facility] 5SW/SE Attending Nathalia Gordon MD   Hosp Day # 4 PCP No primary Inhalation, Daily  •  CefTRIAXone Sodium (ROCEPHIN) 1 g in sodium chloride 0.9 % 100 mL IVPB-minibag, 1 g, Intravenous, Q24H  •  oxyCODONE-acetaminophen (PERCOCET)  MG per tab 1 tablet, 1 tablet, Oral, Q4H PRN  •  HYDROmorphone HCl PF (DILAUDID) 1 MG EMR    Imaging:  X-Ray    Imaging results reviewed     Impression:  Patient Active Problem List:     Hip pain, acute     Hip pain, acute, right     Hip joint effusion, right     Penile edema     Streptococcal arthritis of right hip ([address]     IV drug abuse

## 2017-03-09 NOTE — PLAN OF CARE
RRT    *See RRT Documentation Record*    Reason the RRT was called:   Assessment of patient leading up to RRT: , BP 83/57  Interventions/Testing: EKG stat, Adenosine 6mg, 500ml bolus of NS  Patient Outcome/Disposition: , patient states no

## 2017-03-09 NOTE — PROGRESS NOTES
[place] [facility] - [facility]    Progress Note    Diaz Villegas Patient Status:  Inpatient    3/31/1965 MRN [id number]   Location [facility] 5SW/SE Attending Vanna Meng MD   Hosp Day # 4 PCP No primary care provider on file.      Subjective: supplements.     Prophylaxis  Therapeutic Lovenox    CODE STATUS  Full    Dispo: pending, possible transfer to [facility].     Results:       Lab Results  Component Value Date   WBC 2.3* 03/09/2017   HGB 8.3* 03/09/2017   HCT 24.9* 03/09/2017   PLT 34

## 2017-03-09 NOTE — PLAN OF CARE
Impaired Activities of Daily Living    • Achieve highest/safest level of independence in self care Not Progressing        Impaired Functional Mobility    • Achieve highest/safest level of mobility/gait Not Progressing        MUSCULOSKELETAL - ADULT    • Re monitored.

## 2017-03-10 LAB
ANION GAP SERPL CALC-SCNC: 2 MMOL/L (ref 0–18)
BASOPHILS # BLD: 0.1 K/UL (ref 0–0.2)
BASOPHILS NFR BLD: 5 %
BUN SERPL-MCNC: 5 MG/DL (ref 8–20)
BUN/CREAT SERPL: 7.7 (ref 10–20)
CALCIUM SERPL-MCNC: 7.5 MG/DL (ref 8.5–10.5)
CHLORIDE SERPL-SCNC: 108 MMOL/L (ref 95–110)
CO2 SERPL-SCNC: 26 MMOL/L (ref 22–32)
CREAT SERPL-MCNC: 0.65 MG/DL (ref 0.5–1.5)
EOSINOPHIL # BLD: 0 K/UL (ref 0–0.7)
EOSINOPHIL NFR BLD: 1 %
ERYTHROCYTE [DISTWIDTH] IN BLOOD BY AUTOMATED COUNT: 16 % (ref 11–15)
GLUCOSE SERPL-MCNC: 90 MG/DL (ref 70–99)
HCT VFR BLD AUTO: 25.6 % (ref 41–52)
HGB BLD-MCNC: 8.6 G/DL (ref 13.5–17.5)
LYMPHOCYTES # BLD: 0.4 K/UL (ref 1–4)
LYMPHOCYTES NFR BLD: 25 %
MCH RBC QN AUTO: 27.8 PG (ref 27–32)
MCHC RBC AUTO-ENTMCNC: 33.6 G/DL (ref 32–37)
MCV RBC AUTO: 82.7 FL (ref 80–100)
METAMYELOCYTES # BLD MANUAL: 0.02 K/UL
METAMYELOCYTES # BLD MANUAL: 0.03 K/UL
METAMYELOCYTES NFR BLD: 1 %
MONOCYTES # BLD: 0.3 K/UL (ref 0–1)
MONOCYTES NFR BLD: 19 %
MYELOCYTES NFR BLD: 2 %
NEUTROPHILS # BLD AUTO: 0.7 K/UL (ref 1.8–7.7)
NEUTROPHILS NFR BLD: 38 %
NEUTS BAND NFR BLD: 8 %
OSMOLALITY UR CALC.SUM OF ELEC: 279 MOSM/KG (ref 275–295)
PLATELET # BLD AUTO: 333 K/UL (ref 140–400)
PMV BLD AUTO: 6.9 FL (ref 7.4–10.3)
POTASSIUM SERPL-SCNC: 4 MMOL/L (ref 3.3–5.1)
POTASSIUM SERPL-SCNC: 4 MMOL/L (ref 3.3–5.1)
RBC # BLD AUTO: 3.09 M/UL (ref 4.5–5.9)
SODIUM SERPL-SCNC: 136 MMOL/L (ref 136–144)
VARIANT LYMPHS NFR BLD MANUAL: 1 %
WBC # BLD AUTO: 1.7 K/UL (ref 4–11)

## 2017-03-10 PROCEDURE — 99239 HOSP IP/OBS DSCHRG MGMT >30: CPT | Performed by: HOSPITALIST

## 2017-03-10 NOTE — PLAN OF CARE
Problem: NEUROLOGICAL - ADULT  Goal: Achieves stable or improved neurological status  INTERVENTIONS  - Assess for and report changes in neurological status  - Initiate measures to prevent increased intracranial pressure  - Maintain blood pressure and fluid Progressing    Problem: MUSCULOSKELETAL - ADULT  Goal: Return mobility to safest level of function  INTERVENTIONS:  - Assess patient stability and activity tolerance for standing, transferring and ambulating w/ or w/o assistive devices  - Assist with trans needs  - Identify cognitive and physical deficits and behaviors that affect risk of falls.   - [place] fall precautions as indicated by assessment.  - Educate pt/family on patient safety including physical limitations  - Instruct pt to call for assistance needed  - Communicate barriers and strategies to overcome with those who interact with patient   Outcome: Progressing    Problem: Patient Centered Care  Goal: Patient preferences are identified and integrated in the patient’s plan of care  Interventions:

## 2017-03-10 NOTE — CONSULTS
Liliana Alvarado   [address] [place] [address]  TEL: [phone]  FAX: [phone]    Tamikanora Sharath Patient Status:  Inpatient    3/31/1965 MRN [id number]   Location [facility] 5SW/SE Attending Junior Disla MD   Hosp Day # 5 PCP No primary ELLIPTA) 62.5 MCG/INH inhaler 1 puff, 1 puff, Inhalation, Daily  •  CefTRIAXone Sodium (ROCEPHIN) 1 g in sodium chloride 0.9 % 100 mL IVPB-minibag, 1 g, Intravenous, Q24H  •  oxyCODONE-acetaminophen (PERCOCET)  MG per tab 1 tablet, 1 tablet, Oral, Q4 Microbiologic Data:     Reviewed in EMR    Imaging:  X-Ray    Imaging results reviewed     Impression:  Patient Active Problem List:     Hip pain, acute     Hip pain, acute, right     Hip joint effusion, right     Penile edema     Streptococcal arthr

## 2017-03-10 NOTE — PROGRESS NOTES
[facility] - [facility]    Progress Note    Obie Sykes Patient Status:  Inpatient    3/31/1965 MRN [id number]   Location [facility] 5SW/SE Attending Ginny Molina, [facility] Day # 5 PCP No primary care provider on file.        Subjecti epidermidis. - f/u repeat blood cultures.  So far negative.   - monitor fever and mental status  - transfer to [facility] for potentially greater reconstruction of the right hip    SVT 3/8.   Converted to NSR with adenosine.   Pt also with hypotensio

## 2017-03-10 NOTE — OCCUPATIONAL THERAPY NOTE
OCCUPATIONAL THERAPY TREATMENT NOTE - INPATIENT     Room Number: 555/555-A   Presenting Problem:  (Fever)    Problem List  Active Problems:    * No active hospital problems. *      ASSESSMENT   Notified RN prior to OT/PT cotx.  Patient presents to therapy w Putting on and taking off regular lower body clothing?: A Little  -   Bathing (including washing, rinsing, drying)?: A Little  -   Toileting, which includes using toilet, bedpan or urinal? : A Little  -   Putting on and taking off regular upper body clot

## 2017-03-10 NOTE — PHYSICAL THERAPY NOTE
PHYSICAL THERAPY TREATMENT NOTE - INPATIENT    Room Number: 555/555-A       Presenting Problem: fevers    hx of septic arthritis right hip  recent  I and D last admission    PMH HIV  ID is following pt      Problem List  Active Problems:    * No active ho A Little   -   Need to walk in hospital room?: A Little   -   Climbing 3-5 steps with a railing?: A Little    AM-PAC Score:  Raw Score: 21   PT Approx Degree of Impairment Score: 28.97%   Standardized Score (AM-PAC Scale): 50.25   CMS Modifier (G-Code): CJ

## 2017-03-11 VITALS
BODY MASS INDEX: 20.65 KG/M2 | HEIGHT: 71 IN | RESPIRATION RATE: 18 BRPM | TEMPERATURE: 98 F | DIASTOLIC BLOOD PRESSURE: 66 MMHG | HEART RATE: 104 BPM | WEIGHT: 147.5 LBS | OXYGEN SATURATION: 95 % | SYSTOLIC BLOOD PRESSURE: 104 MMHG

## 2017-03-11 NOTE — PLAN OF CARE
Patient/Family is able to understand and participate in their care Adequate for Discharge      Discharge to home or other facility with appropriate resources Adequate for Discharge      Achieve highest/safest level of independence in self care Adequate for

## 2017-03-11 NOTE — DISCHARGE SUMMARY
[place] [facility] - [facility]    Discharge Summary    Trinidad Stanford Patient Status:  Inpatient    3/31/1965 MRN [id number]   Location [facility] 5SW/SE Attending No att. providers found   Hosp Day # 5 PCP No primary care provider on file. and relieved only by pain medication.  He denies any other complaints at this time.  He was started on vancomycin and cultures were pending.  He was admitted there under name [employer].     Hospital Course:     Pt was admitted and treated as below: given:  1 tablet on 3/10/2017  9:11 AM   Commonly known as:  TRUVADA        Take 1 tablet by mouth daily.     Refills:  0       Ritonavir 100 MG Tabs   Last time this was given:  100 mg on 3/10/2017  9:11 AM        Take 1 tablet (100 mg total) by mouth shona (25 mg total) by mouth 2x Daily(Beta Blocker).     Quantity:  60 tablet   Refills:  0       oxyCODONE-acetaminophen  MG Tabs   Last time this was given:  1 tablet on 3/10/2017  5:41 AM   Commonly known as:  PERCOCET        Take 1 tablet by mouth every

## 2017-03-22 ENCOUNTER — APPOINTMENT (OUTPATIENT)
Dept: MRI IMAGING | Facility: HOSPITAL | Age: 52
DRG: 969 | End: 2017-03-22
Attending: EMERGENCY MEDICINE
Payer: MEDICAID

## 2017-03-22 ENCOUNTER — APPOINTMENT (OUTPATIENT)
Dept: CT IMAGING | Facility: HOSPITAL | Age: 52
DRG: 969 | End: 2017-03-22
Attending: EMERGENCY MEDICINE
Payer: MEDICAID

## 2017-03-22 ENCOUNTER — APPOINTMENT (OUTPATIENT)
Dept: ULTRASOUND IMAGING | Facility: HOSPITAL | Age: 52
DRG: 969 | End: 2017-03-22
Attending: EMERGENCY MEDICINE
Payer: MEDICAID

## 2017-03-22 ENCOUNTER — HOSPITAL ENCOUNTER (INPATIENT)
Facility: HOSPITAL | Age: 52
LOS: 19 days | Discharge: NURSING, REHAB OR LONG-TERM CARE | DRG: 969 | End: 2017-04-10
Attending: EMERGENCY MEDICINE | Admitting: HOSPITALIST
Payer: MEDICAID

## 2017-03-22 DIAGNOSIS — M00.9 PYOGENIC ARTHRITIS OF RIGHT HIP, DUE TO UNSPECIFIED ORGANISM (HCC): Primary | ICD-10-CM

## 2017-03-22 PROCEDURE — 73721 MRI JNT OF LWR EXTRE W/O DYE: CPT

## 2017-03-22 PROCEDURE — 93971 EXTREMITY STUDY: CPT

## 2017-03-22 PROCEDURE — 74177 CT ABD & PELVIS W/CONTRAST: CPT

## 2017-03-22 PROCEDURE — 99223 1ST HOSP IP/OBS HIGH 75: CPT | Performed by: HOSPITALIST

## 2017-03-22 RX ORDER — ACETAMINOPHEN 325 MG/1
650 TABLET ORAL EVERY 4 HOURS PRN
Status: DISCONTINUED | OUTPATIENT
Start: 2017-03-22 | End: 2017-03-26

## 2017-03-22 RX ORDER — HYDROMORPHONE HYDROCHLORIDE 1 MG/ML
2 INJECTION, SOLUTION INTRAMUSCULAR; INTRAVENOUS; SUBCUTANEOUS ONCE
Status: COMPLETED | OUTPATIENT
Start: 2017-03-22 | End: 2017-03-22

## 2017-03-22 RX ORDER — SODIUM PHOSPHATE, DIBASIC AND SODIUM PHOSPHATE, MONOBASIC 7; 19 G/133ML; G/133ML
1 ENEMA RECTAL ONCE AS NEEDED
Status: ACTIVE | OUTPATIENT
Start: 2017-03-22 | End: 2017-03-22

## 2017-03-22 RX ORDER — MORPHINE SULFATE 4 MG/ML
4 INJECTION, SOLUTION INTRAMUSCULAR; INTRAVENOUS EVERY 2 HOUR PRN
Status: DISCONTINUED | OUTPATIENT
Start: 2017-03-22 | End: 2017-03-26

## 2017-03-22 RX ORDER — ACETAMINOPHEN 500 MG
1000 TABLET ORAL ONCE
Status: COMPLETED | OUTPATIENT
Start: 2017-03-22 | End: 2017-03-22

## 2017-03-22 RX ORDER — SODIUM CHLORIDE 9 MG/ML
INJECTION, SOLUTION INTRAVENOUS CONTINUOUS
Status: DISCONTINUED | OUTPATIENT
Start: 2017-03-22 | End: 2017-03-25

## 2017-03-22 RX ORDER — ONDANSETRON 2 MG/ML
4 INJECTION INTRAMUSCULAR; INTRAVENOUS EVERY 6 HOURS PRN
Status: DISCONTINUED | OUTPATIENT
Start: 2017-03-22 | End: 2017-03-31 | Stop reason: HOSPADM

## 2017-03-22 RX ORDER — ENOXAPARIN SODIUM 100 MG/ML
1 INJECTION SUBCUTANEOUS EVERY 12 HOURS SCHEDULED
Status: DISPENSED | OUTPATIENT
Start: 2017-03-22 | End: 2017-03-30

## 2017-03-22 RX ORDER — DOCUSATE SODIUM 100 MG/1
100 CAPSULE, LIQUID FILLED ORAL 2 TIMES DAILY
Status: DISCONTINUED | OUTPATIENT
Start: 2017-03-22 | End: 2017-03-31 | Stop reason: HOSPADM

## 2017-03-22 RX ORDER — ACETAMINOPHEN 325 MG/1
650 TABLET ORAL EVERY 6 HOURS PRN
Status: DISCONTINUED | OUTPATIENT
Start: 2017-03-22 | End: 2017-03-31 | Stop reason: HOSPADM

## 2017-03-22 RX ORDER — HYDROMORPHONE HYDROCHLORIDE 1 MG/ML
1 INJECTION, SOLUTION INTRAMUSCULAR; INTRAVENOUS; SUBCUTANEOUS ONCE
Status: COMPLETED | OUTPATIENT
Start: 2017-03-22 | End: 2017-03-22

## 2017-03-22 RX ORDER — POLYETHYLENE GLYCOL 3350 17 G/17G
17 POWDER, FOR SOLUTION ORAL DAILY PRN
Status: DISCONTINUED | OUTPATIENT
Start: 2017-03-22 | End: 2017-03-31 | Stop reason: HOSPADM

## 2017-03-22 RX ORDER — 0.9 % SODIUM CHLORIDE 0.9 %
VIAL (ML) INJECTION
Status: COMPLETED
Start: 2017-03-22 | End: 2017-03-22

## 2017-03-22 RX ORDER — HYDROCODONE BITARTRATE AND ACETAMINOPHEN 5; 325 MG/1; MG/1
1 TABLET ORAL EVERY 4 HOURS PRN
Status: DISCONTINUED | OUTPATIENT
Start: 2017-03-22 | End: 2017-03-23

## 2017-03-22 RX ORDER — MORPHINE SULFATE 2 MG/ML
2 INJECTION, SOLUTION INTRAMUSCULAR; INTRAVENOUS EVERY 2 HOUR PRN
Status: DISCONTINUED | OUTPATIENT
Start: 2017-03-22 | End: 2017-03-26

## 2017-03-22 RX ORDER — HYDROCODONE BITARTRATE AND ACETAMINOPHEN 5; 325 MG/1; MG/1
2 TABLET ORAL EVERY 4 HOURS PRN
Status: DISCONTINUED | OUTPATIENT
Start: 2017-03-22 | End: 2017-03-23

## 2017-03-22 RX ORDER — MORPHINE SULFATE 2 MG/ML
1 INJECTION, SOLUTION INTRAMUSCULAR; INTRAVENOUS EVERY 2 HOUR PRN
Status: DISCONTINUED | OUTPATIENT
Start: 2017-03-22 | End: 2017-03-26

## 2017-03-22 RX ORDER — BISACODYL 10 MG
10 SUPPOSITORY, RECTAL RECTAL
Status: DISCONTINUED | OUTPATIENT
Start: 2017-03-22 | End: 2017-03-31 | Stop reason: HOSPADM

## 2017-03-22 NOTE — ED NOTES
[facility] pt [facility] er [facility] for evaluation of chronic right hip and leg pain/swelling: pt was here early March for septic arthritis that grew strep pneumo, pt has also history of DVT to right leg.  Pt was seen at [facility] on Saturday and given

## 2017-03-22 NOTE — H&P
[facility]    PATIENT'S NAME: Kimberly Blanco PHYSICIAN: Angela Garibay MD   PATIENT ACCOUNT#:   [id number]    LOCATION:  [facility] [address] [facility]  MEDICAL RECORD #:   [id number]       YOB: 1965  ADMISSION DATE:       03/22 x 2.1 cm fluid collection at the medial margin of the right iliacus muscle is unchanged in size, but now contains complex fluid either hemorrhagic or abscess.   A new fluid collection in the distal rectus abdominis just above the pubic insertion, the larges he has been complaining of pain and swelling in his right lower extremity, pain in the lateral aspect of his right hip down to his right knee. Other 12-point review of systems is unobtainable.         PHYSICAL EXAMINATION:    GENERAL:  Alert and oriented t

## 2017-03-23 PROCEDURE — 99233 SBSQ HOSP IP/OBS HIGH 50: CPT | Performed by: HOSPITALIST

## 2017-03-23 RX ORDER — EMTRICITABINE AND TENOFOVIR DISOPROXIL FUMARATE 200; 300 MG/1; MG/1
1 TABLET, FILM COATED ORAL DAILY
Status: DISCONTINUED | OUTPATIENT
Start: 2017-03-23 | End: 2017-04-10

## 2017-03-23 RX ORDER — 0.9 % SODIUM CHLORIDE 0.9 %
VIAL (ML) INJECTION
Status: COMPLETED
Start: 2017-03-23 | End: 2017-03-23

## 2017-03-23 RX ORDER — ATAZANAVIR 300 MG/1
300 CAPSULE ORAL
Status: DISCONTINUED | OUTPATIENT
Start: 2017-03-24 | End: 2017-04-10

## 2017-03-23 RX ORDER — OXYCODONE AND ACETAMINOPHEN 10; 325 MG/1; MG/1
1 TABLET ORAL EVERY 4 HOURS PRN
Status: DISCONTINUED | OUTPATIENT
Start: 2017-03-23 | End: 2017-03-24

## 2017-03-23 RX ORDER — GENTAMICIN SULFATE 3 MG/ML
1 SOLUTION/ DROPS OPHTHALMIC 4 TIMES DAILY
Status: DISCONTINUED | OUTPATIENT
Start: 2017-03-23 | End: 2017-04-10

## 2017-03-23 RX ORDER — RITONAVIR 100 MG/1
100 TABLET ORAL DAILY
Status: DISCONTINUED | OUTPATIENT
Start: 2017-03-23 | End: 2017-04-10

## 2017-03-23 NOTE — PLAN OF CARE
PAIN - ADULT    • Verbalizes/displays adequate comfort level or patient's stated pain goal Not Progressing        Patient continues to be in significant pain. Morphine and percocet prn for pain management.      Altered Communication/Language Barrier    • Pa in medication list.

## 2017-03-23 NOTE — CONSULTS
[facility]    Report of Consultation    Trinidad Stanford Patient Status:  Inpatient    3/31/1965 MRN [id number]   Location [facility] 5SW/SE Attending Markus Isabel MD   Hosp Day # 0 PCP Harleen Rodgers MD     Date of Admission reports that he quit smoking about a year ago. He has quit using smokeless tobacco. He reports that he does not use illicit drugs.     Allergies:  No Known Allergies    Medications:    Current facility-administered medications:   •  CefTRIAXone Sodium %.    General appearance: alert, appears stated age, cachectic and moderate distress  Head: Normocephalic, without obvious abnormality, atraumatic  Neck: no JVD and supple, symmetrical, trachea midline  Pulmonary:  No labored breathing, equal respiratory e cm new complex fluid collection in the soft tissues lateral to the right hip. The high T1 signal is most typically associated with hematoma, but can also be seen with abscess.  3. A 2.6 x 2 x 2.1 cm fluid collection medial margin of right iliacus muscle is central narrowing at L4-5. 10. Calcified lymph node adjacent to the neck of the gallbladder versus a calcified gallstone. Additional periportal calcification compatible with a calcified lymph node.                  Impression and Plan:  Patient Active Probl as counseling/coordination of care:    47191- 80 min    [facility]  3/22/2017  7:51 PM

## 2017-03-23 NOTE — DISCHARGE PLANNING
Pt is unable to provide much information regarding his recent medical needs. Pt had previously been at [facility] and was sent to [facility]. Leeroy Hartley placed call to the  listed on the pt's insurance card, Carlos Falling [phone].  She states she is

## 2017-03-23 NOTE — PROGRESS NOTES
[place] [facility] - [facility]    Progress Note    Trinidad Stanford Patient Status:  Inpatient    3/31/1965 MRN [id number]   Location [facility] 5SW/SE Attending Mady Wagoner MD   Hosp Day # 1 PCP Harleen Rodgers MD       Subjective:     Pt c/o rig associated with hematoma, but can also be seen with abscess. 3. A 2.6 x 2 x 2.1 cm fluid collection medial margin of right iliacus muscle is unchanged in size, but now contains complex fluid-either hemorrhage or abscess.  4. New fluid collections distal rec gallstone. Additional periportal calcification compatible with a calcified lymph node.                Assessment and Plan:       Persistent right hip septic arthritis with abscesses developing on the medial and lateral sides through the compartment musculat

## 2017-03-23 NOTE — PAYOR COMM NOTE
Mary Force #[id number]  (47 year old M)       [facility] [id number]         Laura Jo MD Physician Signed  H&P 3/22/2017  3:33 PM      [facility]      PATIENT'S NAME:  Walker DOZIER   ATTENDING PHYS complex fluid collection in the soft tissue lateral to the right hip.  The high T1 signal is most typically associated with hematoma but could also be associated with abscess.  A 2.6 x 2 x 2.1 cm fluid collection at the medial margin of the right iliacus m use, currently does not use drugs.  It is not clear if the patient drinks alcohol or smokes.      REVIEW OF SYSTEMS:  Difficult to obtain from the patient because of a language barrier, but he has been complaining of pain and swelling in his right lower ex consult.  Dr. Gregoria Caballero and Dr. Shelbi Odell to be notified.  Further recommendations to follow.      Dictated By Darek Zabala MD  d:     03/22/2017 15:33:18  t:      03/22/2017 15:57:33  [facility]   [id number]  FB/          Last signed by: Agapito Ellis

## 2017-03-23 NOTE — CONSULTS
[facility]    Report of Consultation    Bess Butler Patient Status:  Inpatient    3/31/1965 MRN [id number]   Location [facility] 5SW/SE Attending Sandford Duane, MD   Hosp Day # 0 PCP Lisandra Fraga MD     Date of Admission Current Medications:    Current Facility-Administered Medications:  CefTRIAXone Sodium (ROCEPHIN) 1 g in sodium chloride 0.9 % 100 mL IVPB-minibag 1 g Intravenous Q24H   Vancomycin HCl (VANCOCIN) 1,000 mg in sodium chloride 0.9 % 250 mL IVPB add-van 1.5 g into the vein every 12 (twelve) hours. emtricitabine-tenofovir 200-300 MG Oral Tab Take 1 tablet by mouth daily. ritonavir (NORVIR) 100 MG Oral Cap Take 100 mg by mouth daily.    Atazanavir Sulfate (REYATAZ) 300 MG Oral Cap Take 300 mg by mouth da Take 1 tablet (25 mg total) by mouth 2x Daily(Beta Blocker). ferrous sulfate 325 (65 FE) MG Oral Tab EC Take 1 tablet (325 mg total) by mouth 2 (two) times daily with meals.    Cyclobenzaprine HCl 10 MG Oral Tab Take 10 mg by mouth 3 (three) times daily a TROP 0.00 10/29/2016   B12 686 02/22/2017         Imaging:  Mri Hips, Right (ipi=[id number])    3/22/2017  CONCLUSION:  1.  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 4. Small fluid collections distal right rectus abdominis muscle are almost isodense with the muscle, but were clearly delineated on MRI.  5. Right common femoral and femoral ve

## 2017-03-23 NOTE — PAYOR COMM NOTE
Attending Physician: Nathaly Vo MD    Review Type: ADMISSION   Reviewer:  Elise Barth       Date: March 23, 2017 - 2:03 PM  Payor: [address] [place] [address]  Authorization Number: [id number]  Admit date: 3/22/2017  9:42 AM   Admitt which showed advanced erosive inflammatory arthritis of the right hip compatible with septic arthritis and destructive changes of the right hip and the acetabulum, persistent inflammatory synovitis and complex joint effusion, and more pronounced marked moe drainage on recent hospitalization in February 2016. The patient was supposed to follow up at [facility] for definitive reconstruction of his right hip. MEDICATIONS:  At home, the patient said he is not currently on any antibiotics.   He finishe abscesses developing on the medial and lateral sides through the compartment musculature, as outlined above via an MRI report. 2.   Possible persistent sepsis. 3.   HIV-positive status. 4.   Anemia of chronic disease.    5.   Right lower extremity ellen

## 2017-03-23 NOTE — PROGRESS NOTES
[facility]    Progress Note    Emma Reed Patient Status:  Inpatient    3/31/1965 MRN [id number]   Location [facility] 5SW/SE Attending Stacy Carroll, [address] [facility] Day # 1 PCP Adilson Murrell MD     Subjective:     Constituti 03/23/2017   CO2 27 03/23/2017   GLU 94 03/23/2017   CA 8.1* 03/23/2017   ALB 2.3* 03/22/2017   ALKPHO 108* 03/22/2017   BILT 0.6 03/22/2017   TP 7.3 03/22/2017   AST 49* 03/22/2017   ALT 24 03/22/2017   PTT 38.9* 03/22/2017   INR 1.3* 03/22/2017   ESRML 9 the medial wall likely account for the fluid confined to the right pelvic side wall along the medial acetabulum.  2. A right proximal thigh fluid collection lateral to the intertrochanteric line contains foci of gas which are either related to recent sampli

## 2017-03-23 NOTE — PROGRESS NOTES
[facility]    Progress Note    Eufemia Rey Patient Status:  Inpatient    3/31/1965 MRN [id number]   Location [facility] 5SW/SE Attending Diana Jean MD   Hosp Day # 1 PCP Aziza Morales MD       Subjective:    [employer] 219       Lab Results  Component Value Date   INR 1.3* 03/22/2017       Recent Labs   Lab  03/22/17   0954  03/23/17   0609   GLU  139*  94   BUN  21*  11   CREATSERUM  1.01  0.76   GFRAA  >60  >60   GFRNAA  >60  >60   CA  8.4*  8.1*   ALB  2.3*   --    NA and proximal thigh. Protrusio acetabula with small fracture defects in the medial wall likely account for the fluid confined to the right pelvic side wall along the medial acetabulum.  2. A right proximal thigh fluid collection lateral to the intertrochante

## 2017-03-23 NOTE — PLAN OF CARE
Patient asked to have family member bring in a list of patient medications prior to admission. Patient's family stated they will bring list either tonight or tomorrow.  Explained to patient that he will not be able to receive his home medications until his

## 2017-03-23 NOTE — PLAN OF CARE
Problem: Patient/Family Goals  Goal: Patient/Family Long Term Goal  Patient’s Long Term Goal: To go home    Interventions:  - Adequate pain control  -Return to baseline  -Free from injury  -Free from infection  - See additional Care Plan goals for specific Consider OT/PT consult to assist with strengthening/mobility  - Encourage toileting schedule   Outcome: Progressing    Problem: DISCHARGE PLANNING  Goal: Discharge to home or other facility with appropriate resources  INTERVENTIONS:  - Identify barriers to knowledge, values, beliefs, and cultural backgrounds into the planning and delivery of care  - Encourage patient/family to participate in care and decision-making at the level they choose  - Honor patient and family perspectives and choices   Outcome: Prog

## 2017-03-23 NOTE — PLAN OF CARE
Problem: Patient/Family Goals  Goal: Patient/Family Long Term Goal  Patient’s Long Term Goal: To go home    Interventions:  - Adequate pain control  -Return to baseline  -Free from injury  -Free from infection  - See additional Care Plan goals for specific consult to assist with strengthening/mobility  - Encourage toileting schedule   Outcome: Progressing    Problem: DISCHARGE PLANNING  Goal: Discharge to home or other facility with appropriate resources  INTERVENTIONS:  - Identify barriers to discharge w/pt beliefs, and cultural backgrounds into the planning and delivery of care  - Encourage patient/family to participate in care and decision-making at the level they choose  - Honor patient and family perspectives and choices   Outcome: Progressing    Comments

## 2017-03-23 NOTE — PROGRESS NOTES
120 [facility] dosing service    Initial Pharmacokinetic Consult for Vancomycin Dosing     Johann Pereyra is a 45year old male who is being treated for septic arthritis.   Pharmacy has been asked to dose Vancomycin by Dr. Andrzej Wall    He has No Known Aller (25mg/kg, capped at 2g) x 1 dose, followed by 1 gm Q 12 hours  based upon, 68.9 kg weight, renal function, and pharmacokinetics. 2.  Pharmacy ordered Vancomycin trough level(s) prior to 4th dose. Goal trough level 15-20 ug/mL.     3.  Pharmacy will nee

## 2017-03-24 PROCEDURE — 99233 SBSQ HOSP IP/OBS HIGH 50: CPT | Performed by: HOSPITALIST

## 2017-03-24 RX ORDER — 0.9 % SODIUM CHLORIDE 0.9 %
VIAL (ML) INJECTION
Status: COMPLETED
Start: 2017-03-24 | End: 2017-03-24

## 2017-03-24 RX ORDER — OXYCODONE HYDROCHLORIDE AND ACETAMINOPHEN 5; 325 MG/1; MG/1
1 TABLET ORAL EVERY 4 HOURS PRN
Status: DISCONTINUED | OUTPATIENT
Start: 2017-03-24 | End: 2017-03-26

## 2017-03-24 NOTE — PROGRESS NOTES
ORTHO ATTENDING    Patient is Polish speaking only and no family is around to discuss the case.     Has sig contracture to RLE and pain with any motion of the hip  Blister on the RLE present as well    Plan: Patient with post-septic R hip with severe secon

## 2017-03-24 NOTE — PROGRESS NOTES
[facility]    Progress Note    Gin Brooks Patient Status:  Inpatient    3/31/1965 MRN [id number]   Location [facility] 5SW/SE Attending Maddie Pineda MD   Hosp Day # 2 PCP Gurinder Camp MD       Subjective:   Leg pain prasanna unchanged in size, but now contains complex fluid-either hemorrhage or abscess. 4. New fluid collections distal rectus abdominous just above pubic insertion-largest measuring 5.1 x 2.2 x 1 cm. Differential diagnosis is hemorrhage and/or abscess.  5. Small b hip septic arthritis with abscesses developing on the medial and lateral sides through the compartment musculature, as outlined above via an MRI report.    - discussed with general surgery and ortho on consult.  Pt needs specialized trauma/pelvic ortho for

## 2017-03-24 NOTE — PLAN OF CARE
Verbalizes/displays adequate comfort level or patient's stated pain goal Progressing    Morphine 2mgm ivp q2 hours prn pain,  Percocet 5/325 q 4 hours prn pain,

## 2017-03-24 NOTE — PROGRESS NOTES
[facility]    Progress Note    Johann Pereyra Patient Status:  Inpatient    3/31/1965 MRN [id number]   Location [facility] 5SW/SE Attending Donis Dobbins MD   Hosp Day # 2 PCP Eva Montana MD       Subjective:    Intellicyt present.           Results:       Recent Labs   Lab  03/22/17   0954  03/23/17   0609   RBC  3.42*  3.22*   HGB  9.5*  9.0*   HCT  28.6*  27.0*   MCV  83.6  84.1   MCH  27.8  28.0   MCHC  33.2  33.3   RDW  19.2*  19.2*   WBC  4.3  2.4*   PLT  244  219 and short saphenous veins. Findings similar to February 21, 2017. Ct Abdomen Pelvis Iv Contrast, No Oral (er)    3/22/2017  CONCLUSION:  1.  Destructive septic arthritis of right hip with complex joint effusion with synovitis, and marked surrounding

## 2017-03-24 NOTE — PROGRESS NOTES
120 [facility] dosing service    Follow-up Pharmacokinetic Consult for Vancomycin Dosing     Ting Bae is a 45year old male admitted on 03/22/17 who is being treated for septic arthritis. Patient is on day 3 of Vancomycin 1 gm IV Q 12 hours.   Goal the above:    1. Increase Vancomycin at 750 mg IVPB Q 8 hours (based on Trough of 10.9 ug/mL, pharmacokinetics, dosing weight (68.9 kg) and renal function)    2. Pharmacy will re-check Vancomycin trough levels prior to 4th dose on 03/35/17 at 1730 hrs.   G

## 2017-03-24 NOTE — PROGRESS NOTES
INFECTIOUS DISEASE PROGRESS NOTE    Roger Gomez Patient Status:  Inpatient    3/31/1965 MRN [id number]   Location [facility] 5SW/SE Attending Pravin Pedroza MD   Hosp Day # 2 PCP Andrea Alvarado MD     Subjective:  Afebrile.  Still with signi with +S. pneumo and well as S. pneumo bacteremia. Discharged on ceftriaxone for anticipated 6 week course until anticipated 4/2/17. On 3/3/17 restarted on ART after genotype returned.  Re-admitted on [address] [place] [address] on 3/5/17 with bacteremia with CoNS 2 different types 2017  # Leukopenia, neutropenia noted - likely 2/2 HIV/AIDS and medications - monitor    PLAN:    - continue ceftriaxone and vancomycin  - previously EOT of ceftriaxone 4/2/17 - final TBD  - will need extensive surgery of R hip not limited to amputation pe

## 2017-03-24 NOTE — PROGRESS NOTES
[place] [facility]    Progress Note    Catarina Villagomez Patient Status:  Inpatient    3/31/1965 MRN [id number]   Location [facility] 5SW/SE Attending Fernando Ellis, [address] [facility] Day # 2 PCP Priyanka Varghese MD     Subjective:     Constitut CREATSERUM 0.90 03/24/2017   BUN 10 03/24/2017   * 03/24/2017   K 3.9 03/24/2017    03/24/2017   CO2 26 03/24/2017   * 03/24/2017   CA 8.0* 03/24/2017   ALB 2.3* 03/22/2017   ALKPHO 108* 03/22/2017   BILT 0.6 03/22/2017   TP 7.3 03/22/ along the medial acetabulum. 2. A right proximal thigh fluid collection lateral to the intertrochanteric line contains foci of gas which are either related to recent sampling, or abscess.  3. Small fluid collection medial aspect of right iliacus muscle bett

## 2017-03-24 NOTE — PLAN OF CARE
Problem: Patient/Family Goals  Goal: Patient/Family Long Term Goal  Patient’s Long Term Goal: To go home    Interventions:  - Adequate pain control  -Return to baseline  -Free from injury  -Free from infection  - See additional Care Plan goals for specific pt to call for assistance with activity based on assessment  - Modify environment to reduce risk of injury  - Provide assistive devices as appropriate  - Consider OT/PT consult to assist with strengthening/mobility  - Encourage toileting schedule   Outcome with those who interact with patient   Outcome: Progressing  Using staff for translating prn, above interventions assisting with communication    Problem: Patient Centered Care  Goal: Patient preferences are identified and integrated in the patient’s plan

## 2017-03-25 PROCEDURE — 99233 SBSQ HOSP IP/OBS HIGH 50: CPT | Performed by: HOSPITALIST

## 2017-03-25 RX ORDER — 0.9 % SODIUM CHLORIDE 0.9 %
VIAL (ML) INJECTION
Status: COMPLETED
Start: 2017-03-25 | End: 2017-03-25

## 2017-03-25 RX ORDER — HYDROMORPHONE HYDROCHLORIDE 1 MG/ML
1 INJECTION, SOLUTION INTRAMUSCULAR; INTRAVENOUS; SUBCUTANEOUS EVERY 6 HOURS PRN
Status: DISCONTINUED | OUTPATIENT
Start: 2017-03-25 | End: 2017-03-26

## 2017-03-25 RX ORDER — SODIUM CHLORIDE 9 MG/ML
INJECTION, SOLUTION INTRAVENOUS
Status: COMPLETED
Start: 2017-03-25 | End: 2017-03-25

## 2017-03-25 NOTE — PROGRESS NOTES
[facility]    Progress Note    Alayna Eid Patient Status:  Inpatient    3/31/1965 MRN [id number]   Location [facility] 5SW/SE Attending Kilo Duncan MD   Hosp Day # 3 PCP Medina Crump MD       Subjective:    Kojami complicated radiographic findings, noncompliance as documented by his ID physicians who have treated his HIV/AIDS. Further care will be deferred to him and his partner. Please call with any further concerns.     High complexity decision making and care co

## 2017-03-25 NOTE — PROGRESS NOTES
INFECTIOUS DISEASE PROGRESS NOTE    Obie Sykes Patient Status:  Inpatient    3/31/1965 MRN [id number]   Location [facility] 5SW/SE Attending Ginny Molina MD   Hosp Day # 3 PCP Livan Jean MD     Subjective:  Afebrile.  Still with signi arthritis s/p aspiration and I&D with +S. pneumo and well as S. pneumo bacteremia. Discharged on ceftriaxone for anticipated 6 week course until anticipated 4/2/17. On 3/3/17 restarted on ART after genotype returned.  Re-admitted on [facility] on 3/5/17 with bacte Juliette apparently last in ? January 2017  # Leukopenia, neutropenia noted - likely 2/2 HIV/AIDS and medications - monitor    PLAN:    - continue ceftriaxone and vancomycin  - previously EOT of ceftriaxone 4/2/17 - final TBD  - will need extensive surgery

## 2017-03-25 NOTE — CONSULTS
Jennifer Magana Patient Status:  Inpatient    3/31/1965 MRN [id number]   Location [facility] 5SW/SE Attending Claudio Moffett MD   Hosp Day # 3 PCP Rebecca Eddy MD     Date:  3/25/201 REMOVAL OF LUNG,LOBECTOMY      HERNIA SURGERY       Family History   Problem Relation Age of Onset   • Diabetes Father    • Hypertension Father    • Heart Disorder Father    • Diabetes Mother    • Heart Disorder Mother    • Hypertension Mother      Social Aerosol Powder, Breath Activated Inhale 1 puff into the lungs daily. oxyCODONE-acetaminophen  MG Oral Tab Take 1 tablet by mouth every 4 (four) hours as needed for Pain.    Enoxaparin Sodium 80 MG/0.8ML Subcutaneous Solution Inject 1 mg/kg into the gallop, regular rate and rhythm, regular rate and rhythm  Extremities: extremities normal, atraumatic, no cyanosis or edema  Pulses: 2+ and symmetric  Skin: Skin color, texture, turgor normal. No rashes or lesions  Neurologic: Sensory: normal  Motor:grossl extremely high, making him a suboptimal candidate for ultimate reconstruction.  As such, I've offered him either a [place] procedure or a stage I antibiotic spacer, emphasizing that this would not be functional options (ie he would not be able to weight

## 2017-03-25 NOTE — PROGRESS NOTES
[facility]    Progress Note    David Mcnulty Patient Status:  Inpatient    3/31/1965 MRN [id number]   Location [facility] 5SW/SE Attending Radha Parsons MD   Hosp Day # 3 PCP Cain Pitt MD       Subjective:     Pt with in MD  3/25/2017

## 2017-03-25 NOTE — PLAN OF CARE
Endorsed to Tyrese Arauz RN to reconcile patient's home medications when wife brings meds/list of meds.

## 2017-03-26 PROCEDURE — 02HV33Z INSERTION OF INFUSION DEVICE INTO SUPERIOR VENA CAVA, PERCUTANEOUS APPROACH: ICD-10-PCS | Performed by: HOSPITALIST

## 2017-03-26 PROCEDURE — 99233 SBSQ HOSP IP/OBS HIGH 50: CPT | Performed by: HOSPITALIST

## 2017-03-26 RX ORDER — ZOLPIDEM TARTRATE 5 MG/1
5 TABLET ORAL NIGHTLY PRN
Status: DISCONTINUED | OUTPATIENT
Start: 2017-03-26 | End: 2017-03-28

## 2017-03-26 RX ORDER — OXYCODONE AND ACETAMINOPHEN 10; 325 MG/1; MG/1
1 TABLET ORAL EVERY 4 HOURS PRN
Status: DISCONTINUED | OUTPATIENT
Start: 2017-03-26 | End: 2017-03-31

## 2017-03-26 RX ORDER — HYDROMORPHONE HYDROCHLORIDE 1 MG/ML
1 INJECTION, SOLUTION INTRAMUSCULAR; INTRAVENOUS; SUBCUTANEOUS EVERY 4 HOURS PRN
Status: DISCONTINUED | OUTPATIENT
Start: 2017-03-26 | End: 2017-03-31 | Stop reason: ALTCHOICE

## 2017-03-26 RX ORDER — LIDOCAINE HYDROCHLORIDE 10 MG/ML
0.5 INJECTION, SOLUTION INFILTRATION; PERINEURAL ONCE AS NEEDED
Status: ACTIVE | OUTPATIENT
Start: 2017-03-26 | End: 2017-03-26

## 2017-03-26 RX ORDER — SODIUM CHLORIDE 0.9 % (FLUSH) 0.9 %
10 SYRINGE (ML) INJECTION AS NEEDED
Status: DISCONTINUED | OUTPATIENT
Start: 2017-03-26 | End: 2017-03-31 | Stop reason: HOSPADM

## 2017-03-26 NOTE — PROGRESS NOTES
PICC Line Insertion Note    Procedure: Insertion of # 5 FR / Double Power Solo    Indications:  Poor Access    Procedure Details   Patient and/or significant others educated regarding insertion of PICC line, rationale for procedure, and after care.  Informe

## 2017-03-26 NOTE — PROGRESS NOTES
120 [facility] dosing service    Follow-up Pharmacokinetic Consult for Vancomycin Dosing     Elvira Delong is a 45year old male admitted on 03/22/17 who is being treated for osteomyelitis and septic arthritis.    Patient is on day 4 of Vancomycin 750 mg N/A         Based on the above:    1. Continue Vancomycin at 750 mg IVPB Q 8 hours (based on Trough of 15.6 ug/mL, pharmacokinetics, actual body weight and renal function)    2. Pharmacy will re-check Vancomycin trough levels prior to 3-5 days.   Goal trou

## 2017-03-26 NOTE — PROGRESS NOTES
[place] [facility]    Progress Note    Austin Jim Patient Status:  Inpatient    3/31/1965 MRN [id number]   Location [facility] 5SW/SE Attending Deshawn Britt MD   Hosp Day # 4 PCP Flaco Paulino MD       Subjective:     Pt c/o leg proph:   lovenox    Code status:   Full    >35 minutes spent    Chiquis Mckee MD  3/26/2017

## 2017-03-26 NOTE — PLAN OF CARE
Problem: Patient/Family Goals  Goal: Patient/Family Long Term Goal  Patient’s Long Term Goal: To go home    Interventions:  - Adequate pain control  -Return to baseline  -Free from injury  -Free from infection  - See additional Care Plan goals for specific assessment.  - Educate pt/family on patient safety including physical limitations  - Instruct pt to call for assistance with activity based on assessment  - Modify environment to reduce risk of injury  - Provide assistive devices as appropriate  - Consider Outcome: Progressing    Problem: Patient Centered Care  Goal: Patient preferences are identified and integrated in the patient’s plan of care  Interventions:  - What would you like us to know as we care for you?  I am Mohawk speaking  - Provide timely, c

## 2017-03-26 NOTE — PLAN OF CARE
Problem: Patient/Family Goals  Goal: Patient/Family Long Term Goal  Patient’s Long Term Goal: To go home    Interventions:  - Adequate pain control  -Return to baseline  -Free from injury  -Free from infection  - See additional Care Plan goals for specific assessment.  - Educate pt/family on patient safety including physical limitations  - Instruct pt to call for assistance with activity based on assessment  - Modify environment to reduce risk of injury  - Provide assistive devices as appropriate  - Consider to overcome with those who interact with patient   Outcome: Not Progressing  Patient Omani speaking only. Used a three way phone with a  to get a consent for a PICC line placement.      Problem: Patient Centered Care  Goal: Patient preferences a

## 2017-03-27 PROCEDURE — 99232 SBSQ HOSP IP/OBS MODERATE 35: CPT | Performed by: HOSPITALIST

## 2017-03-27 RX ORDER — 0.9 % SODIUM CHLORIDE 0.9 %
VIAL (ML) INJECTION
Status: COMPLETED
Start: 2017-03-27 | End: 2017-03-27

## 2017-03-27 RX ORDER — SODIUM CHLORIDE 9 MG/ML
INJECTION, SOLUTION INTRAVENOUS
Status: COMPLETED
Start: 2017-03-27 | End: 2017-03-28

## 2017-03-27 NOTE — PLAN OF CARE
Problem: Patient/Family Goals  Goal: Patient/Family Long Term Goal  Patient’s Long Term Goal: To go home    Interventions:  - Adequate pain control  -Return to baseline  -Free from injury  -Free from infection  - See additional Care Plan goals for specific OT/PT consult to assist with strengthening/mobility  - Encourage toileting schedule   Outcome: Progressing    Problem: DISCHARGE PLANNING  Goal: Discharge to home or other facility with appropriate resources  INTERVENTIONS:  - Identify barriers to discharg values, beliefs, and cultural backgrounds into the planning and delivery of care  - Encourage patient/family to participate in care and decision-making at the level they choose  - Honor patient and family perspectives and choices   Outcome: Progressing

## 2017-03-27 NOTE — PROGRESS NOTES
[facility]    Progress Note    Tiffanie Hammond Patient Status:  Inpatient    3/31/1965 MRN [id number]   Location [facility] 5SW/SE Attending Renny Delgadillo, [address] [facility] Day # 5 PCP Debby Butler MD     Subjective:     Constitu  03/25/2017   CREATSERUM 0.82 03/25/2017   BUN 7* 03/25/2017   * 03/25/2017   K 4.0 03/25/2017    03/25/2017   CO2 25 03/25/2017   * 03/25/2017   CA 8.5 03/25/2017   ALB 2.3* 03/22/2017   ALKPHO 108* 03/22/2017   BILT 0.6 03/22/

## 2017-03-27 NOTE — PROGRESS NOTES
INFECTIOUS DISEASE PROGRESS NOTE    Bess Butler Patient Status:  Inpatient    3/31/1965 MRN [id number]   Location [facility] 5SW/SE Attending Jayce Sullivan, [address] [facility] Day # 5 PCP Lisandra Fraga MD     Subjective:  Afebrile.  Still with signi anticipated 4/2/17. On 3/3/17 restarted on ART after genotype returned. Re-admitted on [address] [place] [address] on 3/5/17 with bacteremia with CoNS 2 different types in 2 sets on 2/27 ([place]) and 3/4/17 (CoNS). Started on vancomycin with anticipated 2 week course.  Thought continue ceftriaxone and vancomycin  - previously EOT of ceftriaxone 4/2/17 - final TBD  - will need extensive surgery of R hip not limited to amputation per Ortho notes and possibly needs transfer to tertiary care center  - continue HIV meds - restarted

## 2017-03-27 NOTE — PROGRESS NOTES
[place] [facility] - [facility]    Progress Note    Sakina Lezama Patient Status:  Inpatient    3/31/1965 MRN [id number]   Location [facility] 5SW/SE Attending Augustus Savage MD   Hosp Day # 5 PCP Junita Duane, MD       Subjective:     Right leg proph:   lovenox    Code status:   Full      Ahsan Byrd MD  3/27/2017

## 2017-03-28 PROCEDURE — 99232 SBSQ HOSP IP/OBS MODERATE 35: CPT | Performed by: HOSPITALIST

## 2017-03-28 RX ORDER — SODIUM CHLORIDE 9 MG/ML
INJECTION, SOLUTION INTRAVENOUS CONTINUOUS
Status: DISCONTINUED | OUTPATIENT
Start: 2017-03-28 | End: 2017-03-31

## 2017-03-28 RX ORDER — 0.9 % SODIUM CHLORIDE 0.9 %
VIAL (ML) INJECTION
Status: COMPLETED
Start: 2017-03-28 | End: 2017-03-28

## 2017-03-28 RX ORDER — ZOLPIDEM TARTRATE 10 MG/1
10 TABLET ORAL NIGHTLY PRN
Status: DISCONTINUED | OUTPATIENT
Start: 2017-03-28 | End: 2017-03-31 | Stop reason: HOSPADM

## 2017-03-28 RX ORDER — FAMOTIDINE 20 MG/1
10 TABLET ORAL 2 TIMES DAILY
Status: DISCONTINUED | OUTPATIENT
Start: 2017-03-28 | End: 2017-03-31 | Stop reason: HOSPADM

## 2017-03-28 RX ORDER — POTASSIUM CHLORIDE 20 MEQ/1
40 TABLET, EXTENDED RELEASE ORAL EVERY 4 HOURS
Status: DISPENSED | OUTPATIENT
Start: 2017-03-28 | End: 2017-03-28

## 2017-03-28 NOTE — PLAN OF CARE
Problem: Patient Centered Care  Goal: Patient preferences are identified and integrated in the patient’s plan of care  Interventions:  - What would you like us to know as we care for you?  I am Cypriot speaking  - Provide timely, complete, and accurate info

## 2017-03-28 NOTE — PLAN OF CARE
Problem: Patient/Family Goals  Goal: Patient/Family Long Term Goal  Patient’s Long Term Goal: To go home    Interventions:  - Adequate pain control  -Return to baseline  -Free from injury  -Free from infection  - See additional Care Plan goals for specific

## 2017-03-28 NOTE — PLAN OF CARE
Problem: SAFETY ADULT - FALL  Goal: Free from fall injury  INTERVENTIONS:  - Assess pt frequently for physical needs  - Identify cognitive and physical deficits and behaviors that affect risk of falls.   - [place] fall precautions as indicated by assessme

## 2017-03-28 NOTE — PLAN OF CARE
Problem: Integumentary status not within defined limits  Goal: Pt’s integumentary status will be adequate for discharge  Outcome: Progressing

## 2017-03-28 NOTE — PROGRESS NOTES
[place] [facility]    Progress Note    Sakina Lezama Patient Status:  Inpatient    3/31/1965 MRN [id number]   Location [facility] 5SW/SE Attending Augustus Savage MD   Hosp Day # 6 PCP Junita Duane, MD       Subjective:     Leg pain c deep vein thrombosis.   - pt has an IVC filter  - cont lovenox     dvt proph:   lovenox    Code status:   Full      Mustapha Ohara MD  3/28/2017

## 2017-03-28 NOTE — PROGRESS NOTES
[place] [facility]    Progress Note    Trinidad Stanford Patient Status:  Inpatient    3/31/1965 MRN [id number]   Location [facility] 5SW/SE Attending Mady Wagoner, [address] [facility] Day # 6 PCP Harleen Rodgers MD     Subjective:     Constitu 9.0* 03/28/2017   HCT 27.3* 03/28/2017    03/28/2017   CREATSERUM 0.68 03/28/2017   BUN 10 03/28/2017   * 03/28/2017   K 3.5 03/28/2017    03/28/2017   CO2 27 03/28/2017   * 03/28/2017   CA 8.0* 03/28/2017   ALB 2.3* 03/22/2017

## 2017-03-28 NOTE — PLAN OF CARE
Problem: Patient/Family Goals  Goal: Patient/Family Short Term Goal  Patient’s Short Term Goal: Pain free    Interventions:   - Assess for pain  - Give medication as prescribed  - See additional Care Plan goals for specific interventions   Outcome: Not Pro

## 2017-03-28 NOTE — PLAN OF CARE
PAIN - ADULT    • Verbalizes/displays adequate comfort level or patient's stated pain goal Not Progressing        Patient/Family Goals    • Patient/Family Long Term Goal Not Progressing    • Patient/Family Short Term Goal Not Progressing          Altered C

## 2017-03-28 NOTE — PLAN OF CARE
Problem: Altered Communication/Language Barrier  Goal: Patient/Family is able to understand and participate in their care  Interventions:  - Assess communication ability and preferred communication style  - Implement communication aides and strategies  - U

## 2017-03-29 ENCOUNTER — TELEPHONE (OUTPATIENT)
Dept: HEMATOLOGY/ONCOLOGY | Facility: HOSPITAL | Age: 52
End: 2017-03-29

## 2017-03-29 PROCEDURE — 99232 SBSQ HOSP IP/OBS MODERATE 35: CPT | Performed by: HOSPITALIST

## 2017-03-29 RX ORDER — HYDROMORPHONE HYDROCHLORIDE 1 MG/ML
1 INJECTION, SOLUTION INTRAMUSCULAR; INTRAVENOUS; SUBCUTANEOUS ONCE
Status: COMPLETED | OUTPATIENT
Start: 2017-03-29 | End: 2017-03-29

## 2017-03-29 NOTE — TELEPHONE ENCOUNTER
Tony Lovell just to let you know Maral is in the hospital and looks like he's having surgery on 3/31 (hip). Just a FYI.  [address] [facility]

## 2017-03-29 NOTE — PROGRESS NOTES
[facility]    Progress Note    Felicia Steven Patient Status:  Inpatient    3/31/1965 MRN [id number]   Location [facility] 5SW/SE Attending Jennifer Arango MD   Hosp Day # 7 PCP Pancho Dave MD     Subjective:     Constit Results  Component Value Date   WBC 2.7* 03/28/2017   HGB 9.0* 03/28/2017   HCT 27.3* 03/28/2017    03/28/2017   CREATSERUM 0.69 03/29/2017   BUN 7* 03/29/2017   * 03/29/2017   K 4.3 03/29/2017    03/29/2017   CO2 27 03/29/2017   GLU 95

## 2017-03-29 NOTE — PLAN OF CARE
Problem: Patient/Family Goals  Goal: Patient/Family Long Term Goal  Patient’s Long Term Goal: To go home    Interventions:  - Adequate pain control  -Return to baseline  -Free from injury  -Free from infection  - See additional Care Plan goals for specific consult to assist with strengthening/mobility  - Encourage toileting schedule   Outcome: Progressing    Problem: DISCHARGE PLANNING  Goal: Discharge to home or other facility with appropriate resources  INTERVENTIONS:  - Identify barriers to discharge w/pt beliefs, and cultural backgrounds into the planning and delivery of care  - Encourage patient/family to participate in care and decision-making at the level they choose  - Honor patient and family perspectives and choices   Outcome: Progressing

## 2017-03-29 NOTE — PROGRESS NOTES
ORTHO PROGRESS NOTE    After discussions this weekend with the patient and Sadia Soto and Mamadou Kumar, it has become clear that the patient consents to definitive surgery for his Right hip including even a disarticulation given the chronicity and

## 2017-03-29 NOTE — PLAN OF CARE
Problem: Patient/Family Goals  Goal: Patient/Family Long Term Goal  Patient’s Long Term Goal: To go home    Interventions:  - Adequate pain control  -Return to baseline  -Free from injury  -Free from infection  - See additional Care Plan goals for specific consult to assist with strengthening/mobility  - Encourage toileting schedule   Outcome: Progressing    Problem: Altered Communication/Language Barrier  Goal: Patient/Family is able to understand and participate in their care  Interventions:  - Assess comm

## 2017-03-29 NOTE — PROGRESS NOTES
[facility]  Hospitalist Progress  Note     Johann Pereyra Patient Status:  Inpatient    3/31/1965  45year old [facility] [id number]   Location 552/552-A Attending Marquis Adilene MD   Hosp Day # 7 PCP Eva Montana MD     ASSESSMENT/PLAN    Nancy Camejo

## 2017-03-30 PROCEDURE — 99232 SBSQ HOSP IP/OBS MODERATE 35: CPT | Performed by: HOSPITALIST

## 2017-03-30 RX ORDER — CELECOXIB 200 MG/1
400 CAPSULE ORAL ONCE
Status: COMPLETED | OUTPATIENT
Start: 2017-03-31 | End: 2017-03-31

## 2017-03-30 RX ORDER — ACETAMINOPHEN 10 MG/ML
1000 INJECTION, SOLUTION INTRAVENOUS ONCE
Status: COMPLETED | OUTPATIENT
Start: 2017-03-31 | End: 2017-03-31

## 2017-03-30 RX ORDER — PREGABALIN 75 MG/1
75 CAPSULE ORAL ONCE
Status: COMPLETED | OUTPATIENT
Start: 2017-03-31 | End: 2017-03-31

## 2017-03-30 RX ORDER — FAMOTIDINE 10 MG/ML
40 INJECTION, SOLUTION INTRAVENOUS ONCE
Status: COMPLETED | OUTPATIENT
Start: 2017-03-31 | End: 2017-03-31

## 2017-03-30 RX ORDER — SCOLOPAMINE TRANSDERMAL SYSTEM 1 MG/1
1 PATCH, EXTENDED RELEASE TRANSDERMAL
Status: DISCONTINUED | OUTPATIENT
Start: 2017-03-31 | End: 2017-04-10

## 2017-03-30 RX ORDER — ACETAMINOPHEN 10 MG/ML
1000 INJECTION, SOLUTION INTRAVENOUS ONCE
Status: DISCONTINUED | OUTPATIENT
Start: 2017-03-30 | End: 2017-03-30

## 2017-03-30 NOTE — PLAN OF CARE
Problem: Patient/Family Goals  Goal: Patient/Family Long Term Goal  Patient’s Long Term Goal: To go home    Interventions:  - Adequate pain control  -Return to baseline  -Free from injury  -Free from infection  - See additional Care Plan goals for specific risk of injury  - Provide assistive devices as appropriate  - Consider OT/PT consult to assist with strengthening/mobility  - Encourage toileting schedule   Outcome: Progressing  Pt up with staff assist in rolling chair to bathroom.   Fall precautions in  P and integrated in the patient’s plan of care  Interventions:  - What would you like us to know as we care for you?  I am Albanian speaking  - Provide timely, complete, and accurate information to patient/family  - Incorporate patient and family knowledge, va

## 2017-03-30 NOTE — PROGRESS NOTES
[facility]    Progress Note    Dipti Aponte Patient Status:  Inpatient    3/31/1965 MRN [id number]   Location [facility] 5SW/SE Attending Ruy Maldonado, [address] [facility] Day # 8 PCP Mely Tamez MD     Subjective:     Deb Elizabeth tomorrow    Results:       Lab Results  Component Value Date   WBC 2.4* 03/30/2017   HGB 8.6* 03/30/2017   HCT 26.4* 03/30/2017    03/30/2017   CREATSERUM 0.69 03/29/2017   BUN 7* 03/29/2017   * 03/29/2017   K 4.3 03/29/2017    03/29/201

## 2017-03-30 NOTE — PROGRESS NOTES
INFECTIOUS DISEASE PROGRESS NOTE    [facility] Patient Status:  Inpatient    3/31/1965 MRN [id number]   Location [facility] 5SW/SE Attending Rylee Rock, [address] [facility] Se Day # 7 PCP Fernanda Sosa MD     Subjective:  Afebrile.  OR likely this F until anticipated 4/2/17. On 3/3/17 restarted on ART after genotype returned. Re-admitted on [facility] on 3/5/17 with bacteremia with CoNS 2 different types in 2 sets on 2/27 (S. Hominis) and 3/4/17 (CoNS). Started on vancomycin with anticipated 2 week course.  Garrison Essex last saw HIV provider 12/2016              - last labs 2/2016 with vL undetectable and CD4 50              - repeat CD4 2/15/17 13 (20%)              - restarted on ARV on 3/3/17 - genotype wild type off meds  # Arthritis  # IVDU with Heroine apparently la

## 2017-03-30 NOTE — PROGRESS NOTES
[facility]  Hospitalist Progress  Note     Demaris Kathia Patient Status:  Inpatient    3/31/1965  45year old [facility] [id number]   Location 552/552-A Attending Omi Bravo MD   Hosp Day # 8 PCP Anastasia Cheadle, MD     ASSESSMENT/PLAN    Cain Prakash 3.5  4.3   CL  103  105   CO2  27  27

## 2017-03-30 NOTE — PLAN OF CARE
Problem: Patient/Family Goals  Goal: Patient/Family Long Term Goal  Patient’s Long Term Goal: To go home    Interventions:  - Adequate pain control  -Return to baseline  -Free from injury  -Free from infection  - See additional Care Plan goals for specific as indicated by assessment.  - Educate pt/family on patient safety including physical limitations  - Instruct pt to call for assistance with activity based on assessment  - Modify environment to reduce risk of injury  - Provide assistive devices as appropr overcome with those who interact with patient   Outcome: Not Progressing  Patient mood is unpredictable at this time re:surgery and pain    Problem: Patient Centered Care  Goal: Patient preferences are identified and integrated in the patient’s plan of car

## 2017-03-31 ENCOUNTER — APPOINTMENT (OUTPATIENT)
Dept: GENERAL RADIOLOGY | Facility: HOSPITAL | Age: 52
DRG: 969 | End: 2017-03-31
Attending: ORTHOPAEDIC SURGERY
Payer: MEDICAID

## 2017-03-31 ENCOUNTER — SURGERY (OUTPATIENT)
Age: 52
End: 2017-03-31

## 2017-03-31 ENCOUNTER — ANESTHESIA (OUTPATIENT)
Dept: SURGERY | Facility: HOSPITAL | Age: 52
DRG: 969 | End: 2017-03-31
Payer: MEDICAID

## 2017-03-31 ENCOUNTER — ANESTHESIA EVENT (OUTPATIENT)
Dept: SURGERY | Facility: HOSPITAL | Age: 52
DRG: 969 | End: 2017-03-31
Payer: MEDICAID

## 2017-03-31 PROCEDURE — 3E0U029 INTRODUCTION OF OTHER ANTI-INFECTIVE INTO JOINTS, OPEN APPROACH: ICD-10-PCS | Performed by: ORTHOPAEDIC SURGERY

## 2017-03-31 PROCEDURE — 73502 X-RAY EXAM HIP UNI 2-3 VIEWS: CPT

## 2017-03-31 PROCEDURE — 99232 SBSQ HOSP IP/OBS MODERATE 35: CPT | Performed by: HOSPITALIST

## 2017-03-31 PROCEDURE — 0QT60ZZ RESECTION OF RIGHT UPPER FEMUR, OPEN APPROACH: ICD-10-PCS | Performed by: ORTHOPAEDIC SURGERY

## 2017-03-31 DEVICE — CEMENT BONE ZIM PALICOS R: Type: IMPLANTABLE DEVICE | Site: HIP | Status: FUNCTIONAL

## 2017-03-31 RX ORDER — DIPHENHYDRAMINE HCL 25 MG
25 CAPSULE ORAL EVERY 4 HOURS PRN
Status: DISCONTINUED | OUTPATIENT
Start: 2017-03-31 | End: 2017-04-10

## 2017-03-31 RX ORDER — MORPHINE SULFATE 10 MG/ML
6 INJECTION, SOLUTION INTRAMUSCULAR; INTRAVENOUS EVERY 10 MIN PRN
Status: DISCONTINUED | OUTPATIENT
Start: 2017-03-31 | End: 2017-03-31 | Stop reason: HOSPADM

## 2017-03-31 RX ORDER — GLYCOPYRROLATE 0.2 MG/ML
INJECTION INTRAMUSCULAR; INTRAVENOUS AS NEEDED
Status: DISCONTINUED | OUTPATIENT
Start: 2017-03-31 | End: 2017-03-31 | Stop reason: SURG

## 2017-03-31 RX ORDER — NALOXONE HYDROCHLORIDE 0.4 MG/ML
80 INJECTION, SOLUTION INTRAMUSCULAR; INTRAVENOUS; SUBCUTANEOUS AS NEEDED
Status: DISCONTINUED | OUTPATIENT
Start: 2017-03-31 | End: 2017-03-31 | Stop reason: HOSPADM

## 2017-03-31 RX ORDER — ATAZANAVIR 300 MG/1
300 CAPSULE ORAL
Status: DISCONTINUED | OUTPATIENT
Start: 2017-04-01 | End: 2017-03-31

## 2017-03-31 RX ORDER — TRANEXAMIC ACID 100 MG/ML
INJECTION, SOLUTION INTRAVENOUS AS NEEDED
Status: DISCONTINUED | OUTPATIENT
Start: 2017-03-31 | End: 2017-03-31 | Stop reason: SURG

## 2017-03-31 RX ORDER — HYDROMORPHONE HYDROCHLORIDE 1 MG/ML
0.4 INJECTION, SOLUTION INTRAMUSCULAR; INTRAVENOUS; SUBCUTANEOUS EVERY 5 MIN PRN
Status: DISCONTINUED | OUTPATIENT
Start: 2017-03-31 | End: 2017-03-31 | Stop reason: HOSPADM

## 2017-03-31 RX ORDER — CYCLOBENZAPRINE HCL 10 MG
10 TABLET ORAL 3 TIMES DAILY PRN
Status: DISCONTINUED | OUTPATIENT
Start: 2017-03-31 | End: 2017-04-10

## 2017-03-31 RX ORDER — MELATONIN
325 2 TIMES DAILY WITH MEALS
Status: DISCONTINUED | OUTPATIENT
Start: 2017-03-31 | End: 2017-04-10

## 2017-03-31 RX ORDER — DOCUSATE SODIUM 100 MG/1
100 CAPSULE, LIQUID FILLED ORAL 2 TIMES DAILY
Status: DISCONTINUED | OUTPATIENT
Start: 2017-03-31 | End: 2017-04-10

## 2017-03-31 RX ORDER — PHENYLEPHRINE HCL 10 MG/ML
VIAL (ML) INJECTION AS NEEDED
Status: DISCONTINUED | OUTPATIENT
Start: 2017-03-31 | End: 2017-03-31 | Stop reason: SURG

## 2017-03-31 RX ORDER — ACETAMINOPHEN 10 MG/ML
1000 INJECTION, SOLUTION INTRAVENOUS EVERY 6 HOURS
Status: DISPENSED | OUTPATIENT
Start: 2017-03-31 | End: 2017-04-01

## 2017-03-31 RX ORDER — ONDANSETRON 2 MG/ML
4 INJECTION INTRAMUSCULAR; INTRAVENOUS ONCE AS NEEDED
Status: DISCONTINUED | OUTPATIENT
Start: 2017-03-31 | End: 2017-03-31 | Stop reason: HOSPADM

## 2017-03-31 RX ORDER — GABAPENTIN 100 MG/1
100 CAPSULE ORAL 2 TIMES DAILY
Status: DISCONTINUED | OUTPATIENT
Start: 2017-03-31 | End: 2017-04-10

## 2017-03-31 RX ORDER — MAGNESIUM HYDROXIDE 1200 MG/15ML
LIQUID ORAL CONTINUOUS PRN
Status: DISCONTINUED | OUTPATIENT
Start: 2017-03-31 | End: 2017-03-31

## 2017-03-31 RX ORDER — SODIUM CHLORIDE 0.9 % (FLUSH) 0.9 %
10 SYRINGE (ML) INJECTION AS NEEDED
Status: DISCONTINUED | OUTPATIENT
Start: 2017-03-31 | End: 2017-04-10

## 2017-03-31 RX ORDER — HYDROMORPHONE HYDROCHLORIDE 1 MG/ML
0.8 INJECTION, SOLUTION INTRAMUSCULAR; INTRAVENOUS; SUBCUTANEOUS EVERY 2 HOUR PRN
Status: DISCONTINUED | OUTPATIENT
Start: 2017-03-31 | End: 2017-04-03

## 2017-03-31 RX ORDER — TRAMADOL HYDROCHLORIDE 50 MG/1
50 TABLET ORAL EVERY 6 HOURS PRN
Status: DISCONTINUED | OUTPATIENT
Start: 2017-03-31 | End: 2017-04-10

## 2017-03-31 RX ORDER — HYDROMORPHONE HYDROCHLORIDE 1 MG/ML
INJECTION, SOLUTION INTRAMUSCULAR; INTRAVENOUS; SUBCUTANEOUS AS NEEDED
Status: DISCONTINUED | OUTPATIENT
Start: 2017-03-31 | End: 2017-03-31 | Stop reason: SURG

## 2017-03-31 RX ORDER — MORPHINE SULFATE 2 MG/ML
2 INJECTION, SOLUTION INTRAMUSCULAR; INTRAVENOUS EVERY 10 MIN PRN
Status: DISCONTINUED | OUTPATIENT
Start: 2017-03-31 | End: 2017-03-31 | Stop reason: HOSPADM

## 2017-03-31 RX ORDER — TRAMADOL HYDROCHLORIDE 50 MG/1
50 TABLET ORAL EVERY 6 HOURS
Status: DISCONTINUED | OUTPATIENT
Start: 2017-03-31 | End: 2017-04-03

## 2017-03-31 RX ORDER — FAMOTIDINE 20 MG/1
20 TABLET ORAL 2 TIMES DAILY
Status: DISCONTINUED | OUTPATIENT
Start: 2017-03-31 | End: 2017-03-31

## 2017-03-31 RX ORDER — OXYCODONE HYDROCHLORIDE 5 MG/1
5 TABLET ORAL EVERY 4 HOURS PRN
Status: DISCONTINUED | OUTPATIENT
Start: 2017-03-31 | End: 2017-04-06

## 2017-03-31 RX ORDER — LIDOCAINE HYDROCHLORIDE 10 MG/ML
INJECTION, SOLUTION EPIDURAL; INFILTRATION; INTRACAUDAL; PERINEURAL AS NEEDED
Status: DISCONTINUED | OUTPATIENT
Start: 2017-03-31 | End: 2017-03-31 | Stop reason: SURG

## 2017-03-31 RX ORDER — METOCLOPRAMIDE HYDROCHLORIDE 5 MG/ML
10 INJECTION INTRAMUSCULAR; INTRAVENOUS EVERY 6 HOURS PRN
Status: ACTIVE | OUTPATIENT
Start: 2017-03-31 | End: 2017-04-02

## 2017-03-31 RX ORDER — METOPROLOL SUCCINATE 25 MG/1
25 TABLET, EXTENDED RELEASE ORAL 2 TIMES DAILY
Status: DISCONTINUED | OUTPATIENT
Start: 2017-03-31 | End: 2017-03-31

## 2017-03-31 RX ORDER — DEXAMETHASONE SODIUM PHOSPHATE 4 MG/ML
10 VIAL (ML) INJECTION ONCE
Status: COMPLETED | OUTPATIENT
Start: 2017-04-01 | End: 2017-04-01

## 2017-03-31 RX ORDER — ZOLPIDEM TARTRATE 5 MG/1
5 TABLET ORAL NIGHTLY PRN
Status: DISCONTINUED | OUTPATIENT
Start: 2017-03-31 | End: 2017-04-10

## 2017-03-31 RX ORDER — HALOPERIDOL 5 MG/ML
0.25 INJECTION INTRAMUSCULAR ONCE AS NEEDED
Status: DISCONTINUED | OUTPATIENT
Start: 2017-03-31 | End: 2017-03-31 | Stop reason: HOSPADM

## 2017-03-31 RX ORDER — ASPIRIN 325 MG
325 TABLET ORAL 2 TIMES DAILY
Status: DISCONTINUED | OUTPATIENT
Start: 2017-04-01 | End: 2017-03-31

## 2017-03-31 RX ORDER — MORPHINE SULFATE 4 MG/ML
4 INJECTION, SOLUTION INTRAMUSCULAR; INTRAVENOUS EVERY 10 MIN PRN
Status: DISCONTINUED | OUTPATIENT
Start: 2017-03-31 | End: 2017-03-31 | Stop reason: HOSPADM

## 2017-03-31 RX ORDER — NEOSTIGMINE METHYLSULFATE 0.5 MG/ML
INJECTION INTRAVENOUS AS NEEDED
Status: DISCONTINUED | OUTPATIENT
Start: 2017-03-31 | End: 2017-03-31 | Stop reason: SURG

## 2017-03-31 RX ORDER — SENNOSIDES 8.6 MG
17.2 TABLET ORAL NIGHTLY
Status: DISCONTINUED | OUTPATIENT
Start: 2017-03-31 | End: 2017-04-10

## 2017-03-31 RX ORDER — DIPHENHYDRAMINE HYDROCHLORIDE 50 MG/ML
25 INJECTION INTRAMUSCULAR; INTRAVENOUS ONCE AS NEEDED
Status: ACTIVE | OUTPATIENT
Start: 2017-03-31 | End: 2017-03-31

## 2017-03-31 RX ORDER — POLYETHYLENE GLYCOL 3350 17 G/17G
17 POWDER, FOR SOLUTION ORAL DAILY PRN
Status: DISCONTINUED | OUTPATIENT
Start: 2017-03-31 | End: 2017-04-10

## 2017-03-31 RX ORDER — SODIUM PHOSPHATE, DIBASIC AND SODIUM PHOSPHATE, MONOBASIC 7; 19 G/133ML; G/133ML
1 ENEMA RECTAL ONCE AS NEEDED
Status: ACTIVE | OUTPATIENT
Start: 2017-03-31 | End: 2017-03-31

## 2017-03-31 RX ORDER — TOBRAMYCIN 1.2 G/30ML
INJECTION, POWDER, LYOPHILIZED, FOR SOLUTION INTRAVENOUS AS NEEDED
Status: DISCONTINUED | OUTPATIENT
Start: 2017-03-31 | End: 2017-03-31 | Stop reason: HOSPADM

## 2017-03-31 RX ORDER — ROCURONIUM BROMIDE 10 MG/ML
INJECTION, SOLUTION INTRAVENOUS AS NEEDED
Status: DISCONTINUED | OUTPATIENT
Start: 2017-03-31 | End: 2017-03-31 | Stop reason: SURG

## 2017-03-31 RX ORDER — EMTRICITABINE AND TENOFOVIR DISOPROXIL FUMARATE 200; 300 MG/1; MG/1
1 TABLET, FILM COATED ORAL DAILY
Status: DISCONTINUED | OUTPATIENT
Start: 2017-03-31 | End: 2017-03-31 | Stop reason: ALTCHOICE

## 2017-03-31 RX ORDER — FLUCONAZOLE 100 MG/1
200 TABLET ORAL DAILY
Status: DISCONTINUED | OUTPATIENT
Start: 2017-03-31 | End: 2017-03-31

## 2017-03-31 RX ORDER — HYDROMORPHONE HYDROCHLORIDE 1 MG/ML
0.4 INJECTION, SOLUTION INTRAMUSCULAR; INTRAVENOUS; SUBCUTANEOUS EVERY 2 HOUR PRN
Status: DISCONTINUED | OUTPATIENT
Start: 2017-03-31 | End: 2017-04-03

## 2017-03-31 RX ORDER — SODIUM CHLORIDE, SODIUM LACTATE, POTASSIUM CHLORIDE, CALCIUM CHLORIDE 600; 310; 30; 20 MG/100ML; MG/100ML; MG/100ML; MG/100ML
INJECTION, SOLUTION INTRAVENOUS CONTINUOUS
Status: DISCONTINUED | OUTPATIENT
Start: 2017-03-31 | End: 2017-04-07

## 2017-03-31 RX ORDER — SERTRALINE HYDROCHLORIDE 25 MG/1
25 TABLET, FILM COATED ORAL DAILY
Status: DISCONTINUED | OUTPATIENT
Start: 2017-03-31 | End: 2017-03-31

## 2017-03-31 RX ORDER — HYDROCODONE BITARTRATE AND ACETAMINOPHEN 5; 325 MG/1; MG/1
1 TABLET ORAL AS NEEDED
Status: DISCONTINUED | OUTPATIENT
Start: 2017-03-31 | End: 2017-03-31 | Stop reason: HOSPADM

## 2017-03-31 RX ORDER — HYDROCODONE BITARTRATE AND ACETAMINOPHEN 5; 325 MG/1; MG/1
2 TABLET ORAL AS NEEDED
Status: DISCONTINUED | OUTPATIENT
Start: 2017-03-31 | End: 2017-03-31 | Stop reason: HOSPADM

## 2017-03-31 RX ORDER — FAMOTIDINE 10 MG/ML
20 INJECTION, SOLUTION INTRAVENOUS 2 TIMES DAILY
Status: DISCONTINUED | OUTPATIENT
Start: 2017-03-31 | End: 2017-03-31

## 2017-03-31 RX ORDER — CELECOXIB 200 MG/1
200 CAPSULE ORAL EVERY 12 HOURS SCHEDULED
Status: DISCONTINUED | OUTPATIENT
Start: 2017-03-31 | End: 2017-04-10

## 2017-03-31 RX ORDER — BISACODYL 10 MG
10 SUPPOSITORY, RECTAL RECTAL
Status: DISCONTINUED | OUTPATIENT
Start: 2017-03-31 | End: 2017-04-10

## 2017-03-31 RX ORDER — ONDANSETRON 2 MG/ML
4 INJECTION INTRAMUSCULAR; INTRAVENOUS EVERY 4 HOURS PRN
Status: DISCONTINUED | OUTPATIENT
Start: 2017-03-31 | End: 2017-04-10

## 2017-03-31 RX ORDER — HYDROMORPHONE HYDROCHLORIDE 1 MG/ML
0.2 INJECTION, SOLUTION INTRAMUSCULAR; INTRAVENOUS; SUBCUTANEOUS EVERY 2 HOUR PRN
Status: DISCONTINUED | OUTPATIENT
Start: 2017-03-31 | End: 2017-04-03

## 2017-03-31 RX ORDER — HYDROMORPHONE HYDROCHLORIDE 1 MG/ML
0.2 INJECTION, SOLUTION INTRAMUSCULAR; INTRAVENOUS; SUBCUTANEOUS EVERY 5 MIN PRN
Status: DISCONTINUED | OUTPATIENT
Start: 2017-03-31 | End: 2017-03-31 | Stop reason: HOSPADM

## 2017-03-31 RX ORDER — DIPHENHYDRAMINE HYDROCHLORIDE 50 MG/ML
12.5 INJECTION INTRAMUSCULAR; INTRAVENOUS EVERY 4 HOURS PRN
Status: DISCONTINUED | OUTPATIENT
Start: 2017-03-31 | End: 2017-04-10

## 2017-03-31 RX ORDER — MIDAZOLAM HYDROCHLORIDE 1 MG/ML
INJECTION INTRAMUSCULAR; INTRAVENOUS AS NEEDED
Status: DISCONTINUED | OUTPATIENT
Start: 2017-03-31 | End: 2017-03-31 | Stop reason: SURG

## 2017-03-31 RX ORDER — CYCLOBENZAPRINE HCL 10 MG
10 TABLET ORAL 3 TIMES DAILY
Status: DISCONTINUED | OUTPATIENT
Start: 2017-03-31 | End: 2017-03-31 | Stop reason: ALTCHOICE

## 2017-03-31 RX ORDER — ENOXAPARIN SODIUM 100 MG/ML
40 INJECTION SUBCUTANEOUS DAILY
Status: DISCONTINUED | OUTPATIENT
Start: 2017-04-01 | End: 2017-04-10

## 2017-03-31 RX ORDER — ACETAMINOPHEN 325 MG/1
650 TABLET ORAL EVERY 6 HOURS
Status: DISCONTINUED | OUTPATIENT
Start: 2017-04-01 | End: 2017-04-07

## 2017-03-31 RX ORDER — HYDROMORPHONE HYDROCHLORIDE 1 MG/ML
0.6 INJECTION, SOLUTION INTRAMUSCULAR; INTRAVENOUS; SUBCUTANEOUS EVERY 5 MIN PRN
Status: DISCONTINUED | OUTPATIENT
Start: 2017-03-31 | End: 2017-03-31 | Stop reason: HOSPADM

## 2017-03-31 RX ORDER — GABAPENTIN 300 MG/1
300 CAPSULE ORAL NIGHTLY
Status: DISCONTINUED | OUTPATIENT
Start: 2017-03-31 | End: 2017-04-10

## 2017-03-31 RX ADMIN — MIDAZOLAM HYDROCHLORIDE 2 MG: 1 INJECTION INTRAMUSCULAR; INTRAVENOUS at 07:34:00

## 2017-03-31 RX ADMIN — TRANEXAMIC ACID 1000 MG: 100 INJECTION, SOLUTION INTRAVENOUS at 09:20:00

## 2017-03-31 RX ADMIN — SODIUM CHLORIDE: 9 INJECTION, SOLUTION INTRAVENOUS at 10:15:00

## 2017-03-31 RX ADMIN — GLYCOPYRROLATE 0.4 MG: 0.2 INJECTION INTRAMUSCULAR; INTRAVENOUS at 09:55:00

## 2017-03-31 RX ADMIN — PHENYLEPHRINE HCL 100 MCG: 10 MG/ML VIAL (ML) INJECTION at 09:23:00

## 2017-03-31 RX ADMIN — PHENYLEPHRINE HCL 100 MCG: 10 MG/ML VIAL (ML) INJECTION at 09:25:00

## 2017-03-31 RX ADMIN — PHENYLEPHRINE HCL 100 MCG: 10 MG/ML VIAL (ML) INJECTION at 09:10:00

## 2017-03-31 RX ADMIN — LIDOCAINE HYDROCHLORIDE 50 MG: 10 INJECTION, SOLUTION EPIDURAL; INFILTRATION; INTRACAUDAL; PERINEURAL at 07:34:00

## 2017-03-31 RX ADMIN — SODIUM CHLORIDE: 9 INJECTION, SOLUTION INTRAVENOUS at 08:10:00

## 2017-03-31 RX ADMIN — NEOSTIGMINE METHYLSULFATE 2 MG: 0.5 INJECTION INTRAVENOUS at 09:55:00

## 2017-03-31 RX ADMIN — HYDROMORPHONE HYDROCHLORIDE 0.25 MG: 1 INJECTION, SOLUTION INTRAMUSCULAR; INTRAVENOUS; SUBCUTANEOUS at 09:50:00

## 2017-03-31 RX ADMIN — TRANEXAMIC ACID 1000 MG: 100 INJECTION, SOLUTION INTRAVENOUS at 07:50:00

## 2017-03-31 RX ADMIN — SODIUM CHLORIDE: 9 INJECTION, SOLUTION INTRAVENOUS at 07:33:00

## 2017-03-31 RX ADMIN — SODIUM CHLORIDE: 9 INJECTION, SOLUTION INTRAVENOUS at 09:00:00

## 2017-03-31 RX ADMIN — HYDROMORPHONE HYDROCHLORIDE 0.25 MG: 1 INJECTION, SOLUTION INTRAMUSCULAR; INTRAVENOUS; SUBCUTANEOUS at 08:45:00

## 2017-03-31 RX ADMIN — ROCURONIUM BROMIDE 30 MG: 10 INJECTION, SOLUTION INTRAVENOUS at 07:34:00

## 2017-03-31 RX ADMIN — HYDROMORPHONE HYDROCHLORIDE 0.5 MG: 1 INJECTION, SOLUTION INTRAMUSCULAR; INTRAVENOUS; SUBCUTANEOUS at 08:06:00

## 2017-03-31 NOTE — RESPIRATORY THERAPY NOTE
DARBY ASSESSMENT:    Pt does not have a previous diagnosis of DARBY. Pt does not routinely use a CPAP device at home. This pt is not suspected to be at high risk for DARBY and sleep lab packet was not provided to patient for outpatient follow-up.     CPAP INITIAT

## 2017-03-31 NOTE — DISCHARGE PLANNING
MD orders received regarding discharge planning. RONAK spoke with Dr. Deepti Reza, regarding limited options for rehab due to the pt's insurance. Referral has been sent to [facility] and DON screen has been requested from [facility].   Acceptance and insurance approval wi

## 2017-03-31 NOTE — PLAN OF CARE
Problem: Patient/Family Goals  Goal: Patient/Family Long Term Goal  Patient’s Long Term Goal: To go home    Interventions:  - Adequate pain control  -Return to baseline  -Free from injury  -Free from infection  - See additional Care Plan goals for specific assessment  - Modify environment to reduce risk of injury  - Provide assistive devices as appropriate  - Consider OT/PT consult to assist with strengthening/mobility  - Encourage toileting schedule   Outcome: Progressing  Patient up with assist only, fall What would you like us to know as we care for you?  I am Greenlandic speaking  - Provide timely, complete, and accurate information to patient/family  - Incorporate patient and family knowledge, values, beliefs, and cultural backgrounds into the planning and de

## 2017-03-31 NOTE — PROGRESS NOTES
[facility]  Hospitalist Progress  Note     Devyn Diego Patient Status:  Inpatient    3/31/1965  47year old [facility] [id number]   Location 552/552-A Attending Zoey Toure MD   Hosp Day # 9 PCP Gurinder Villalpando MD     ASSESSMENT/PLAN    Rosebud Collet

## 2017-03-31 NOTE — OCCUPATIONAL THERAPY NOTE
Hold therapy for today due to low bp per RN Page Croft). Activity orders clarified w/ Dr Parveen Freeman.  Pt to be oob, ttwb RLE, limit extremes of movement R hip

## 2017-03-31 NOTE — PROGRESS NOTES
[place] [facility]    Progress Note    Diaz Villegas Patient Status:  Inpatient    3/31/1965 MRN [id number]   Location [facility] 5SW/SE Attending Nathaly Vo, [address] [facility] Day # 9 PCP Junior Ellis MD     Subjective:     Kandis Diaz monitor    DVT proph: asa, lovenox 4/1  Code status:   Full  Dispo: pending    Results:       Lab Results  Component Value Date   WBC 2.4* 03/30/2017   HGB 8.6* 03/30/2017   HCT 26.4* 03/30/2017    03/30/2017   CREATSERUM 0.69 03/29/2017   BUN 7* 03

## 2017-03-31 NOTE — PHYSICAL THERAPY NOTE
Attempted physical therapy evaluation. Per RN Annalise, patient is in extreme pain and hypotensive at this time. Will re-attempt as he is appropriate. Discussed orders with Dr. Kae Knott.  Instructions provided to proceed with TTWB and global precautions (in order

## 2017-03-31 NOTE — H&P
Interval History & Physical Examination    Patient Name: Juan Jose Bragg  MRN: [id number]  CSN: [id number]  YOB: 1965    Diagnosis: Right Hip Septic Pyogenic Arthritis    Present Illness: 45year old male English speaking male with history Take 100 mg by mouth daily. Disp:  Rfl:     Atazanavir Sulfate (REYATAZ) 300 MG Oral Cap Take 300 mg by mouth daily with breakfast. Disp:  Rfl:     Sertraline HCl 25 MG Oral Tab Take 25 mg by mouth daily.  Disp:  Rfl:     Methadone HCl 5 MG Oral Tab Take 5 Oral Cap Take 1 capsule (100 mg total) by mouth 2 (two) times daily. Disp: 30 capsule Rfl: 0 3/5/2017 at Unknown time   gabapentin 300 MG Oral Cap Take 1 capsule (300 mg total) by mouth nightly.  Disp: 30 capsule Rfl: 0 Past Week at Unknown time   Metoprolo (TYLENOL) tab 650 mg 650 mg Oral Q6H PRN   docusate sodium (COLACE) cap 100 mg 100 mg Oral BID   PEG 3350 (MIRALAX) powder packet 17 g 17 g Oral Daily PRN   magnesium hydroxide (MILK OF MAGNESIA) 400 MG/5ML suspension 30 mL 30 mL Oral Daily PRN   bisacodyl

## 2017-03-31 NOTE — PROGRESS NOTES
INFECTIOUS DISEASE PROGRESS NOTE    Juan Jose Bragg Patient Status:  Inpatient    3/31/1965 MRN [id number]   Location [facility] 5SW/SE Attending Candis Hyatt MD   Hosp Day # 9 PCP Hipolito Chavez MD     Subjective:  Patient seen and examined, ART after genotype returned. Re-admitted on [address] [place] [address] on 3/5/17 with bacteremia with CoNS 2 different types in 2 sets on 2/27 (S. Hominis) and 3/4/17 (CoNS). Started on vancomycin with anticipated 2 week course.  Thought to be PICC related and concern for possibl compliant              - last saw HIV provider 12/2016              - last labs 2/2016 with vL undetectable and CD4 50              - repeat CD4 2/15/17 13 (20%)              - restarted on ARV on 3/3/17 - genotype wild type off meds  # Arthritis  # IVDU w

## 2017-03-31 NOTE — ANESTHESIA PREPROCEDURE EVALUATION
Anesthesia PreOp Note    HPI:     Diaz Villegas is a 47year old male who presents for preoperative consultation requested by: Esther Jack MD    Date of Surgery: 3/31/2017    Procedure(s):  HIP TOTAL ARTHROPLASTY REVISION  Indication: Infected righ 0    sodium chloride 0.9 % SOLN 100 mL with CefTRIAXone Sodium 1 g SOLR 1 g Inject 1 g into the vein daily. Disp: 34 Dose Rfl: 0    sodium chloride 0.9 % SOLN 500 mL with Vancomycin HCl 500 MG SOLR 1.5 g Inject 1.5 g into the vein every 12 (twelve) hours. as needed. Disp: 30 tablet Rfl: 0 3/5/2017 at Unknown time   acetaminophen 325 MG Oral Tab Take 2 tablets (650 mg total) by mouth every 6 (six) hours as needed.  Disp: 30 tablet Rfl: 0 Past Month at Unknown time   Enoxaparin Sodium 80 MG/0.8ML Subcutaneous tablet 1 tablet Oral Q4H PRN Renny Delgadillo MD 1 tablet at 03/27/17 2054    HYDROmorphone HCl PF (DILAUDID) 1 MG/ML injection 1 mg 1 mg Intravenous Q4H PRN Kalen Mcfarland MD 1 mg at 03/31/17 0300    [MAR Hold] Normal Saline Flush 0.9 % injection 10 mL Relation Age of Onset   • Diabetes Father    • Hypertension Father    • Heart Disorder Father    • Diabetes Mother    • Heart Disorder Mother    • Hypertension Mother        Social History   Marital Status:   Spouse Name: N/A    Years of Education: 03/31/17  0645   BP: 116/75 128/67  113/65   Pulse: 117 105  99   Temp: 97.8 °F (36.6 °C) 97.7 °F (36.5 °C)  97.9 °F (36.6 °C)   TempSrc: Oral Oral  Oral   Resp: 20 20  20   Height:       Weight:   72.292 kg (159 lb 6 oz)    SpO2: 95% 100%  100%        Ane

## 2017-03-31 NOTE — ANESTHESIA POSTPROCEDURE EVALUATION
Patient:  Cara Buchanan [facility]    Procedure Summary     Date Anesthesia Start Anesthesia Stop Room / Location    03/31/17 0733  [facility] MAIN OR 06 / [facility] MAIN OR       Procedure Diagnosis Surgeon Responsible Provider    HIP TOTAL ARTHROPLASTY REVISION (Right Hip) (

## 2017-04-01 PROCEDURE — 99233 SBSQ HOSP IP/OBS HIGH 50: CPT | Performed by: HOSPITALIST

## 2017-04-01 PROCEDURE — 30233N1 TRANSFUSION OF NONAUTOLOGOUS RED BLOOD CELLS INTO PERIPHERAL VEIN, PERCUTANEOUS APPROACH: ICD-10-PCS | Performed by: HOSPITALIST

## 2017-04-01 RX ORDER — SODIUM CHLORIDE 9 MG/ML
INJECTION, SOLUTION INTRAVENOUS ONCE
Status: DISCONTINUED | OUTPATIENT
Start: 2017-04-01 | End: 2017-04-10

## 2017-04-01 RX ORDER — SODIUM CHLORIDE 9 MG/ML
INJECTION, SOLUTION INTRAVENOUS
Status: COMPLETED
Start: 2017-04-01 | End: 2017-04-01

## 2017-04-01 RX ORDER — MELATONIN
325 2 TIMES DAILY WITH MEALS
Qty: 60 TABLET | Refills: 0 | Status: ON HOLD | OUTPATIENT
Start: 2017-04-01 | End: 2018-10-20

## 2017-04-01 RX ORDER — TRAMADOL HYDROCHLORIDE 50 MG/1
50 TABLET ORAL EVERY 6 HOURS PRN
Qty: 100 TABLET | Refills: 0 | Status: SHIPPED | OUTPATIENT
Start: 2017-04-15 | End: 2017-04-07

## 2017-04-01 RX ORDER — OXYCODONE HCL 20 MG/1
20 TABLET, FILM COATED, EXTENDED RELEASE ORAL EVERY 12 HOURS
Status: DISCONTINUED | OUTPATIENT
Start: 2017-04-01 | End: 2017-04-02

## 2017-04-01 RX ORDER — SODIUM CHLORIDE 0.9 % (FLUSH) 0.9 %
10 SYRINGE (ML) INJECTION AS NEEDED
Status: DISCONTINUED | OUTPATIENT
Start: 2017-04-01 | End: 2017-04-10

## 2017-04-01 RX ORDER — TRAMADOL HYDROCHLORIDE 50 MG/1
50 TABLET ORAL EVERY 6 HOURS PRN
Qty: 100 TABLET | Refills: 0 | Status: SHIPPED | OUTPATIENT
Start: 2017-04-01 | End: 2017-04-07

## 2017-04-01 RX ORDER — CELECOXIB 200 MG/1
200 CAPSULE ORAL DAILY
Qty: 30 CAPSULE | Refills: 0 | Status: ON HOLD | OUTPATIENT
Start: 2017-04-01 | End: 2018-10-20

## 2017-04-01 RX ORDER — OXYCODONE HYDROCHLORIDE 5 MG/1
5 TABLET ORAL EVERY 4 HOURS PRN
Qty: 100 TABLET | Refills: 0 | Status: SHIPPED | OUTPATIENT
Start: 2017-04-01 | End: 2017-04-07

## 2017-04-01 NOTE — PROGRESS NOTES
[facility]  Hospitalist Progress  Note     Vishal Morse Patient Status:  Inpatient    3/31/1965  47year old CSN [id number]   Location 552/552-A Attending Juan Jose Roque MD   Hosp Day # 10 PCP Phil Us MD     ASSESSMENT/PLAN    R deficit. Coordination  and gait normal.   MS: No joint effusions. Right lower extremity edema, stable  Skin: Skin is warm and dry. No rashes, erythema, diaphoresis. Psych: Normal mood and affect.  Behavior and judgment normal.     Labs:  Recent Labs   La

## 2017-04-01 NOTE — PLAN OF CARE
Altered Communication/Language Barrier    • Patient/Family is able to understand and participate in their care Progressing    JUAN SPEAKS MAINLY Cameroonian, PHAN PCT AVAILABLE TO TRANSLATE THIS [address]    • Discharge to home or other f ROUNDING, USES CALL LIGHT APPROPRIATELY      SKIN/TISSUE INTEGRITY - ADULT    • Incision(s), wounds(s) or drain site(s) healing without S/S of infection Progressing    JUAN HAS INCISION TO RIGHT HIP COVERED IN AQUACELL DRESSING, C/D/I. SWELLING TO RLE.

## 2017-04-01 NOTE — PHYSICAL THERAPY NOTE
PT orders received, chart reviewed. Holding PT at this time, due to HgB 6.7, and pt is to receive blood transfusion this morning. Will follow up as pt is appropriate. New WB orders 4/1/17 from TIFFANY Ernandez.

## 2017-04-01 NOTE — OCCUPATIONAL THERAPY NOTE
Orders received for OT evaluation. Spoke with RN and MD. MD changing WB status to NWB. Pt also in severe pain and is awaiting a blood transfusion for Hgb of 6.7. Will reschedule for tomorrow.     Crys Lau MA, OTR/L  Occupational Therapist

## 2017-04-01 NOTE — PROGRESS NOTES
[facility]    Progress Note    Diaz Villegas Patient Status:  Inpatient    3/31/1965 MRN [id number]   Location [facility] 4W/SW/SE Attending Leopoldo Bruns, MD   Hosp Day # 10 PCP Junior Ellis MD       Subjective:    [facility] 04/01/2017    04/01/2017   CO2 24 04/01/2017   * 04/01/2017   CA 7.3* 04/01/2017   ALB 2.3* 03/22/2017   ALKPHO 108* 03/22/2017   BILT 0.6 03/22/2017   TP 7.3 03/22/2017   AST 49* 03/22/2017   ALT 24 03/22/2017   PTT 38.9* 03/22/2017   INR 1.3

## 2017-04-02 PROCEDURE — 99233 SBSQ HOSP IP/OBS HIGH 50: CPT | Performed by: HOSPITALIST

## 2017-04-02 RX ORDER — MAGNESIUM SULFATE HEPTAHYDRATE 40 MG/ML
2 INJECTION, SOLUTION INTRAVENOUS ONCE
Status: COMPLETED | OUTPATIENT
Start: 2017-04-02 | End: 2017-04-02

## 2017-04-02 RX ORDER — OXYCODONE HCL 10 MG/1
20 TABLET, FILM COATED, EXTENDED RELEASE ORAL EVERY 8 HOURS
Status: DISCONTINUED | OUTPATIENT
Start: 2017-04-02 | End: 2017-04-10

## 2017-04-02 NOTE — PROGRESS NOTES
[facility]    Progress Note    Carrillo Amaral Patient Status:  Inpatient    3/31/1965 MRN [id number]   Location [facility] 4W/SW/SE Attending Chrissy Goode MD   [facility] Day # 6 PCP Rebecca Eddy MD     Subjective:  Got up to chair to celecoxib  200 mg Oral Q12H   • acetaminophen  650 mg Oral Q6H   • TraMADol HCl  50 mg Oral Q6H   • enoxaparin  40 mg Subcutaneous Daily   • Scopolamine  1 patch Transdermal Q72H   • vancomycin  750 mg Intravenous Q8H   • Gentamicin Sulfate  1 drop Both Ey MD

## 2017-04-02 NOTE — PLAN OF CARE
Altered Communication/Language Barrier    • Patient/Family is able to understand and participate in their care Progressing          PAIN - ADULT    • Verbalizes/displays adequate comfort level or patient's stated pain goal Progressing          Patient Cent

## 2017-04-02 NOTE — OCCUPATIONAL THERAPY NOTE
OCCUPATIONAL THERAPY EVALUATION - INPATIENT      Room Number: 179/465-J  Evaluation Date: 4/2/2017  Type of Evaluation: Initial  Presenting Problem: R hip resection    Physician Order: IP Consult to Occupational Therapy  Reason for Therapy: ADL/IADL Dysfun sponge;3-in-1 commode;Grab bars;Transfer tub bench    PLAN  OT Treatment Plan: ADL training;Functional transfer training;Patient/Family education;Equipment eval/education         OCCUPATIONAL THERAPY MEDICAL/SOCIAL HISTORY     Problem List   Principal Prob limits  Arousal/Alertness:  inconsistent responses to stimuli  Attention Span:  difficulty attending to directions and difficulty dividing attention  Memory:  decreased recall of precautions  Following Commands:  follows one step commands with increased ti dressing with mod assist with use of AE. Comment:     Patient will participate in toilet transfer assessment. Comment:    Pt will stand for 1 min while maintaining NWB status in preparation for ADLs.   Comment:    Patient will independently recall global

## 2017-04-02 NOTE — PHYSICAL THERAPY NOTE
PHYSICAL THERAPY HIP EVALUATION - INPATIENT     Room Number: 211/361-Y  Evaluation Date: 4/2/2017  Type of Evaluation: Initial  Physician Order: PT Eval and Treat    Presenting Problem: s/p R hip resection arthroplasty with placement of antibiotic beads  R 3/31/17, pt received blood transfusion on 4/1/17    Problem List  Principal Problem:    Pyogenic arthritis of right hip, due to unspecified organism [facility])  Active Problems:    Pyogenic arthritis of right hip (HCC)    AIDS (acquired immune deficiency syndro recent events  · Motor Planning: impaired  · Perseveration: perseverates during conversation  · Safety Judgement:  decreased awareness of need for assistance and decreased awareness of need for safety    RANGE OF MOTION AND STRENGTH ASSESSMENT  Upper extre #1 Patient is able to demonstrate supine - sit EOB @ level: supervision   Goal #1   Current Status    Goal #2 Patient is able to demonstrate transfers Sit to/from Stand at assistance level: supervision     Goal #2  Current Status    Goal #3 Patient is able

## 2017-04-02 NOTE — PROGRESS NOTES
[place] [facility] - [facility]    Progress Note    Benedicto Rom Patient Status:  Inpatient    3/31/1965 MRN [id number]   Location [facility] 4W/SW/SE Attending Yoli Serrano MD   [address] Day # 6 PCP Robbin Palomares MD       Subjective:    [facility] 04/02/2017   K 4.3 04/02/2017    04/02/2017   CO2 25 04/02/2017   * 04/02/2017   CA 8.0* 04/02/2017   ALB 2.3* 03/22/2017   ALKPHO 108* 03/22/2017   BILT 0.6 03/22/2017   TP 7.3 03/22/2017   AST 49* 03/22/2017   ALT 24 03/22/2017   PTT 38.9* 0

## 2017-04-03 PROCEDURE — 99254 IP/OBS CNSLTJ NEW/EST MOD 60: CPT | Performed by: INTERNAL MEDICINE

## 2017-04-03 PROCEDURE — 99232 SBSQ HOSP IP/OBS MODERATE 35: CPT | Performed by: HOSPITALIST

## 2017-04-03 RX ORDER — FAMOTIDINE 20 MG/1
10 TABLET ORAL 2 TIMES DAILY
Status: DISCONTINUED | OUTPATIENT
Start: 2017-04-03 | End: 2017-04-04

## 2017-04-03 RX ORDER — HYDROMORPHONE HYDROCHLORIDE 1 MG/ML
0.3 INJECTION, SOLUTION INTRAMUSCULAR; INTRAVENOUS; SUBCUTANEOUS EVERY 2 HOUR PRN
Status: DISCONTINUED | OUTPATIENT
Start: 2017-04-03 | End: 2017-04-03

## 2017-04-03 RX ORDER — HYDROMORPHONE HYDROCHLORIDE 1 MG/ML
0.3 INJECTION, SOLUTION INTRAMUSCULAR; INTRAVENOUS; SUBCUTANEOUS ONCE
Status: COMPLETED | OUTPATIENT
Start: 2017-04-03 | End: 2017-04-03

## 2017-04-03 RX ORDER — DILTIAZEM HYDROCHLORIDE 60 MG/1
60 TABLET, FILM COATED ORAL AS NEEDED
Status: COMPLETED | OUTPATIENT
Start: 2017-04-03 | End: 2017-04-08

## 2017-04-03 RX ORDER — HYDROMORPHONE HYDROCHLORIDE 1 MG/ML
0.2 INJECTION, SOLUTION INTRAMUSCULAR; INTRAVENOUS; SUBCUTANEOUS EVERY 4 HOURS PRN
Status: DISCONTINUED | OUTPATIENT
Start: 2017-04-03 | End: 2017-04-06

## 2017-04-03 RX ORDER — 0.9 % SODIUM CHLORIDE 0.9 %
VIAL (ML) INJECTION
Status: COMPLETED
Start: 2017-04-03 | End: 2017-04-03

## 2017-04-03 RX ORDER — DILTIAZEM HYDROCHLORIDE 5 MG/ML
INJECTION INTRAVENOUS
Status: DISPENSED
Start: 2017-04-03 | End: 2017-04-04

## 2017-04-03 RX ORDER — HYDROMORPHONE HYDROCHLORIDE 1 MG/ML
0.8 INJECTION, SOLUTION INTRAMUSCULAR; INTRAVENOUS; SUBCUTANEOUS EVERY 4 HOURS PRN
Status: DISCONTINUED | OUTPATIENT
Start: 2017-04-03 | End: 2017-04-06

## 2017-04-03 RX ORDER — DILTIAZEM HYDROCHLORIDE 5 MG/ML
10 INJECTION INTRAVENOUS
Status: DISCONTINUED | OUTPATIENT
Start: 2017-04-03 | End: 2017-04-10

## 2017-04-03 RX ORDER — HYDROMORPHONE HYDROCHLORIDE 1 MG/ML
0.4 INJECTION, SOLUTION INTRAMUSCULAR; INTRAVENOUS; SUBCUTANEOUS EVERY 4 HOURS PRN
Status: DISCONTINUED | OUTPATIENT
Start: 2017-04-03 | End: 2017-04-06

## 2017-04-03 NOTE — PROGRESS NOTES
[place] [facility]    Progress Note    Sakina Lezama Patient Status:  Inpatient    3/31/1965 MRN [id number]   Location [facility] 5SW/SE Attending Augustus Savage,  [facility] Se Day # 12 PCP Junita Duane, MD     Subjective:     Cons seen by pcp. Then resume therapeutic dosing per PCP. HIV+, AIDS  - ID on consult  - anti- retrovirals per ID    Asthma  - stable.  monitor    DVT proph: lovenox  Code status:   Full  Dispo: pending, [place] assisting with rehab    Results:       Lab Results

## 2017-04-03 NOTE — PROGRESS NOTES
04/03/17 1651   Clinical Encounter Type   Visited With Patient   Routine Visit Introduction   Continue Visiting Yes   Crisis Visit (RRT)     Pt speaks Frisian only, was able to speak to him through the language line.   Pt said he is okay, that he appreci

## 2017-04-03 NOTE — PROGRESS NOTES
INFECTIOUS DISEASE PROGRESS NOTE    Evan Corrales Patient Status:  Inpatient    3/31/1965 MRN [id number]   Location [facility] 5SW/SE Attending Saige Melgoza MD   Hosp Day # 15 PCP Timmy Murphy MD     Subjective:  Patient seen and examined truvada previously admitted 2/15 - 2/28 with R hip septic arthritis s/p aspiration and I&D with +S. pneumo and well as S. pneumo bacteremia. Discharged on ceftriaxone for anticipated 6 week course until anticipated 4/2/17.  On 3/3/17 restarted on ART after bacteremia 2/27 and 3/4 likely 2/2 PICC s/p vancomycin  # Multiple joints involved including R shoulder bilateral MCP c/w polyarticular septic arthritis due to pneumococcus - improved  # HIV/AIDS on truvada, norvir, Reyataz, bactrim - non compliant

## 2017-04-03 NOTE — PLAN OF CARE
MUSCULOSKELETAL - ADULT    • Return mobility to safest level of function Not Progressing        PAIN - ADULT    • Verbalizes/displays adequate comfort level or patient's stated pain goal Not Progressing        Patient/Family Goals    • Patient/Family Short Shane. He is voiding freely. He ambulates with 2 assist in order to pivot to rolling chair. He refuses to use the walker despite being educated to do so and is non-compliant with his non-weight bearing status. PICC line maintained.  Receiving vanco and ceftr

## 2017-04-03 NOTE — PHYSICAL THERAPY NOTE
PHYSICAL THERAPY HIP TREATMENT NOTE - INPATIENT    Room Number: 120/584-S            Presenting Problem: s/p R hip resection arthroplasty with placement of antibiotic beads    Problem List  Principal Problem:    Pyogenic arthritis of right hip, due to unsp mechanics; Relaxation;Repositioning    BALANCE  Static Sitting: Good  Dynamic Sitting: Good  Static Standing: Poor +  Dynamic Standing: Poor +  ACTIVITY TOLERANCE  Room air    AM-PAC '6-Clicks' INPATIENT SHORT FORM - BASIC MOBILITY  How much difficulty does ambulate 30 feet with assistive device at assistance level: supervision   Goal #3   Current Status Pt hopped 3'x2 with RW min A   Goal #4 Stairs are not an appropriate goal at this time.     Goal #4   Current Status NT   Goal #5 Patient verbalizes and/or de

## 2017-04-03 NOTE — CONSULTS
Pulmonary/Critical Care Consult Note    HPI:   Kimberley Saleh is a 47year old male with Patient presents with:  Cellulitis (integumentary, infectious)    Altagracia Chapman MD    Pt is a 46 yo with HIV, IVDA, non compliance, HTN, HL, SVT, anemia, asthma, and (PEPCID) tab 10 mg 10 mg Oral BID   HYDROmorphone HCl PF (DILAUDID) 1 MG/ML injection 0.2 mg 0.2 mg Intravenous Q4H PRN   Or      HYDROmorphone HCl PF (DILAUDID) 1 MG/ML injection 0.4 mg 0.4 mg Intravenous Q4H PRN   Or      HYDROmorphone HCl PF (DILAUDID) Atropine Sulfate 0.1 MG/ML injection 0.5 mg 0.5 mg Intravenous PRN   [DISCONTINUED] ondansetron HCl (ZOFRAN) injection 4 mg 4 mg Intravenous Once PRN   [DISCONTINUED] Prochlorperazine Edisylate (COMPAZINE) injection 5 mg 5 mg Intravenous Once PRN   [DISCON Daily PRN   bisacodyl (DULCOLAX) rectal suppository 10 mg 10 mg Rectal Daily PRN   [] FLEET ENEMA (FLEET) 7-19 GM/118ML enema 133 mL 1 enema Rectal Once PRN   ondansetron HCl (ZOFRAN) injection 4 mg 4 mg Intravenous Q4H PRN   [] Metoclopramid irrigation   PRN   [DISCONTINUED] sodium chloride 0.9 % irrigation   Continuous PRN   [DISCONTINUED] Tobramycin Sulfate (NEBCIN) 1.2 g injection   PRN   TraMADol HCl (ULTRAM) tab 50 mg 50 mg Oral Q6H PRN   Enoxaparin Sodium (LOVENOX) 40 MG/0.4ML injection solution      [COMPLETED] sodium chloride 0.9 % infusion      [DISCONTINUED] oxyCODONE-acetaminophen (PERCOCET) 5-325 MG per tab 1 tablet 1 tablet Oral Q4H PRN   Vancomycin HCl (VANCOCIN) 750 mg in sodium chloride 0.9 % 250 mL IVPB 750 mg Intravenous Q8H docusate sodium (COLACE) cap 100 mg 100 mg Oral BID   [DISCONTINUED] PEG 3350 (MIRALAX) powder packet 17 g 17 g Oral Daily PRN   [DISCONTINUED] magnesium hydroxide (MILK OF MAGNESIA) 400 MG/5ML suspension 30 mL 30 mL Oral Daily PRN   [DISCONTINUED] bisacod Disorder Father    • Diabetes Mother    • Heart Disorder Mother    • Hypertension Mother             PHYSICAL EXAM:   /88 mmHg  Pulse 124  Temp(Src) 98.8 °F (37.1 °C) (Temporal)  Resp 18  Ht 5' 5\" (1.651 m)  Wt 159 lb 6 oz (72.292 kg)  BMI 26.52 kg/

## 2017-04-03 NOTE — OCCUPATIONAL THERAPY NOTE
OCCUPATIONAL THERAPY TREATMENT NOTE - INPATIENT     Room Number: 765/929-H         Presenting Problem:  (R hip resection arthroplasty)    Problem List  Principal Problem:    Pyogenic arthritis of right hip, due to unspecified organism [facility])  Active Problem simplification techniques;ADL training;Functional transfer training; Endurance training;Patient/Family education;Patient/Family training;Equipment eval/education; Compensatory technique education    SUBJECTIVE  \"I want to go out and get coffee. \"     OBJECT  Comment:  Max. A & max verbal instructions to adhere to global precautions    Patient will participate in toilet transfer assessment.   Comment:  N/t; unsafe at this time   Pt will stand for 1 min while maintaining NWB status in preparation for ADLs.    Co

## 2017-04-03 NOTE — PROGRESS NOTES
[facility]  Hospitalist Progress  Note     Bess Butler Patient Status:  Inpatient    3/31/1965  47year old [facility] [id number]   Location 552/552-A Attending Kyle Vincent MD   Hosp Day # 12 PCP Lisandra Fraga MD     ASSESSMENT/PLAN    R edema, stable  Skin: Skin is warm and dry. No rashes, erythema, diaphoresis. Psych: Normal mood and affect.  Behavior and judgment normal.     Labs:  Recent Labs   Lab  04/01/17   0545  04/01/17   2229  04/02/17   0520  04/03/17   0515   RBC  2.35*   --

## 2017-04-03 NOTE — DISCHARGE PLANNING
SW sent an update to [facility] and left a message for intake to see if they are willing to accept the pt. Waiting on return call. DON screen has been previously requested from [facility].     Nigel DuenasEm[facility] Hospital ext [phone]

## 2017-04-03 NOTE — PROGRESS NOTES
04/03/17 1820   Clinical Encounter Type   Visited With Patient   Routine Visit Follow-up   Continue Visiting Yes   Crisis Visit Critical care   Patient's Supportive Strategies/Resources wife   Patient Spiritual Encounters   Spiritual Assessment Complete

## 2017-04-04 ENCOUNTER — APPOINTMENT (OUTPATIENT)
Dept: CV DIAGNOSTICS | Facility: HOSPITAL | Age: 52
DRG: 969 | End: 2017-04-04
Attending: INTERNAL MEDICINE
Payer: MEDICAID

## 2017-04-04 PROCEDURE — 99233 SBSQ HOSP IP/OBS HIGH 50: CPT | Performed by: HOSPITALIST

## 2017-04-04 PROCEDURE — 93306 TTE W/DOPPLER COMPLETE: CPT | Performed by: INTERNAL MEDICINE

## 2017-04-04 PROCEDURE — 93306 TTE W/DOPPLER COMPLETE: CPT

## 2017-04-04 PROCEDURE — 99233 SBSQ HOSP IP/OBS HIGH 50: CPT | Performed by: INTERNAL MEDICINE

## 2017-04-04 RX ORDER — PANTOPRAZOLE SODIUM 40 MG/1
40 TABLET, DELAYED RELEASE ORAL
Status: DISCONTINUED | OUTPATIENT
Start: 2017-04-05 | End: 2017-04-04

## 2017-04-04 RX ORDER — CALCIUM CARBONATE 200(500)MG
500 TABLET,CHEWABLE ORAL 3 TIMES DAILY PRN
Status: DISCONTINUED | OUTPATIENT
Start: 2017-04-04 | End: 2017-04-10

## 2017-04-04 RX ORDER — FAMOTIDINE 20 MG/1
10 TABLET ORAL 2 TIMES DAILY
Status: DISCONTINUED | OUTPATIENT
Start: 2017-04-04 | End: 2017-04-10

## 2017-04-04 RX ORDER — 0.9 % SODIUM CHLORIDE 0.9 %
VIAL (ML) INJECTION
Status: COMPLETED
Start: 2017-04-04 | End: 2017-04-04

## 2017-04-04 RX ORDER — MAGNESIUM HYDROXIDE/ALUMINUM HYDROXICE/SIMETHICONE 120; 1200; 1200 MG/30ML; MG/30ML; MG/30ML
30 SUSPENSION ORAL 4 TIMES DAILY PRN
Status: DISCONTINUED | OUTPATIENT
Start: 2017-04-04 | End: 2017-04-10

## 2017-04-04 NOTE — PLAN OF CARE
CARDIOVASCULAR - ADULT    • Maintains optimal cardiac output and hemodynamic stability Progressing    • Absence of cardiac arrhythmias or at baseline Progressing    Pt converted from A-fib to sinus

## 2017-04-04 NOTE — PLAN OF CARE
MUSCULOSKELETAL - ADULT    • Return mobility to safest level of function Not Progressing    Pt with c/o severe pain on his R knee, unable to ambulate at this time.       Altered Communication/Language Barrier    • Patient/Family is able to understand and pa

## 2017-04-04 NOTE — PLAN OF CARE
Verbalizes/displays adequate comfort level or patient's stated pain goal Not Progressing    Pt on dilaudid every 4 hours for pain, with intermittent fair pain control, plus scheduled oxycontin. Pt has a lot of pain in right hip with any movement.   Patient/ attempting to get up without help, but reinforced with pt not to do this. Incision(s), wounds(s) or drain site(s) healing without S/S of infection Progressing    No s/s infection from wound noted.     Pt transferred out of PCCU, report given to Jenn and pt

## 2017-04-04 NOTE — DISCHARGE PLANNING
RONAK following up on d/c planning for the patient. He was transferred to CCU and will be returning to the floor today.  Prior SNF referral placed to [facility] and they are reviewing the referral.  Therapy is also recommending acute rehab an

## 2017-04-04 NOTE — ED PROVIDER NOTES
Patient Seen in: [facility] 2w/sw    History   Patient presents with:  Cellulitis (integumentary, infectious)    Stated Complaint: right leg pain and swelling    HPI    47year old Tunisian speaking male with history h/o IVDA and HIV/AIDS with poor c celecoxib 200 MG Oral Cap,  Take 1 capsule (200 mg total) by mouth daily. ferrous sulfate 325 (65 FE) MG Oral Tab EC,  Take 1 tablet (325 mg total) by mouth 2 (two) times daily with meals.    Atazanavir Sulfate 300 MG Oral Cap,  Take 1 capsule (300 mg to Enoxaparin Sodium 80 MG/0.8ML Subcutaneous Solution,  Inject 0.8 mL (80 mg total) into the skin every 12 (twelve) hours. gabapentin 100 MG Oral Cap,  Take 1 capsule (100 mg total) by mouth 2 (two) times daily.    gabapentin 300 MG Oral Cap,  Take 1 capsu Constitutional: He is oriented to person, place, and time. He is cooperative. Non-toxic appearance. He does not have a sickly appearance. He does not appear ill. No distress. HENT:   Head: Normocephalic and atraumatic.  Head is without raccoon's eyes, wi 0400 04/04/17  0500 04/04/17  0600   BP: 93/53 94/52 100/61 102/56   Pulse: 86 86 87 94   Temp:  98.2 °F (36.8 °C)     TempSrc:  Temporal     Resp: 17 15 13 21   Height:       Weight:    83.87 kg   SpO2: 99% 97% 96% 98%     *I personally reviewed and inter MD Discussions or Sign-Outs: admitting MD, ortho from prior admit not pt's primary ortho and requested we consult ortho on-call, and given fluid collection involving rectus, they requested gen surg eval as well.  Spoke with en surg on call who requests CT a Take 1 tablet (50 mg total) by mouth every 6 (six) hours as needed. Qty: 100 tablet Refills: 0    celecoxib 200 MG Oral Cap  Take 1 capsule (200 mg total) by mouth daily. Qty: 30 capsule Refills: 0    !! - Potential duplicate medications found.  Please di

## 2017-04-04 NOTE — OCCUPATIONAL THERAPY NOTE
OCCUPATIONAL THERAPY TREATMENT NOTE - INPATIENT     Room Number: 611/376-N         Presenting Problem:  (R hip resection arthroplasty)    Problem List  Principal Problem:    Pyogenic arthritis of right hip, due to unspecified organism [facility])  Active Problem Patient End of Session: Up in chair;Call light within reach;RN aware of session/findings; All patient questions and concerns addressed; Family present  OT Discharge Recommendations: Acute rehabilitation  OT Device Recommendations: Reacher;Sock aid;Long-h of 2 for safety in transfer  Shower Transfer: NT  Chair Transfer: mod a x 2  Car Transfer: NT    Bedroom Mobility: NT      FUNCTIONAL ADL ASSESSMENT  Grooming: NT  Bathing: NT  Addie Care: NT- pt has a phillips  Upper Body Dressing: min a   Lower Body Dressing

## 2017-04-04 NOTE — CONSULTS
[facility]    PATIENT'S NAME: Pau Byrne   ATTENDING PHYSICIAN: Rob Angela MD   CONSULTING PHYSICIAN: Valeria Perdomo.  Radha Overton MD   PATIENT ACCOUNT#:   [id number]    LOCATION:  [address] [place] [address] #:   [id number]       DATE OF BIRTH: scopolamine, Norvir, OxyContin, oxycodone, Neurontin, Garamycin, iron, Pepcid, Lovenox, Truvada, Cardizem, cyclobenzaprine, Rocephin, Reyataz, Tylenol. SOCIAL HISTORY:  Former smoker. No alcohol noted on chart.     FAMILY HISTORY:  Diabetes, hypertensio in place. Further orders per primary care physician. PLAN:  Patient is back in sinus rhythm with sinus tach. Elevated heart rates are likely due to current medical conditions going on. Continue to treat medical problems. No further SVT noted.   Suyapa Lord

## 2017-04-04 NOTE — PLAN OF CARE
Pt received in room 223 at approx 1820 after RRT called when he was in 419 for a fib, heart rate reportedly in the 190-200 range. Pt alert, oriented, in a lot of pain. Heart rate remains in the 120s.  Pt reportedly received cardizem and adenosine ivp prior

## 2017-04-04 NOTE — PROGRESS NOTES
INFECTIOUS DISEASE PROGRESS NOTE    Catarina Villagomez Patient Status:  Inpatient    3/31/1965 MRN [id number]   Location [facility] 5SW/SE Attending Fernando Ellis MD   Hosp Day # 15 PCP Priyanka Varghese MD     Subjective:  Patient seen and examined [facility])      Antibiotics:  ceftriaxone, vancomycin      ASSESSMENT:  46 yo Albanian speaking male with history h/o HIV/AIDS with poor compliance on Reyatax, norvir, truvada previously admitted 2/15 - 2/28 with R hip septic arthritis s/p aspiration and I&D wit girdlestone resection arthroplasty  # S. pneumoniae bacteremia - cleared              - CXR with no acute findings, emphysema with pulmonary fibrosis  # CoNS bacteremia 2/27 and 3/4 likely 2/2 PICC s/p vancomycin  # Multiple joints involved including R kacy

## 2017-04-04 NOTE — PROGRESS NOTES
[place] [facility]  Hospitalist Progress  Note     Velasquez [facility] Patient Status:  Inpatient    3/31/1965  47year old CSN [id number]   Location 552/552-A Attending Prisca Murray MD   Hosp Day # 15 PCP Fernanda Sosa MD     ASSESSMENT/PLAN    R mmHg    Physical Exam:    Gen: A+Ox3. No distress. HEENT: NCAT, neck supple, no carotid bruit. CV: RRR, S1S2, and intact distal pulses. No gallop, rub, murmur. Pulm: Effort and breath sounds normal. No distress, wheezes, rales, rhonchi.   Abd: Soft, NT

## 2017-04-04 NOTE — PHYSICAL THERAPY NOTE
PHYSICAL THERAPY HIP TREATMENT NOTE - INPATIENT    Room Number: 368           Presenting Problem: s/p R hip resection arthroplasty with placement of antibiotic beads on 3/31/17     Problem List  Principal Problem:    Pyogenic arthritis of right hip, due to precautions. Pt in ICU on telemetry . Pt with decrease right LE AROM and str    Decrease balance  Decrease activity tolerance   Decrease safety awarenss   Decrease complaince and follow through with precautions  Increased fall risk.     PT will follow (including adjusting bedclothes, sheets and blankets)?: A Lot   -   Sitting down on and standing up from a chair with arms (e.g., wheelchair, bedside commode, etc.): A Lot   -   Moving from lying on back to sitting on the side of the bed?: A Lot   How much addressed; Family present    CURRENT GOALS   Goals to be met by: 4/15/17  Patient Goal Patient's self-stated goal is: \"go home\"   Goal #1 Patient is able to demonstrate supine - sit EOB @ level: supervision   Goal #1   Current Status Min assist  --assist

## 2017-04-05 PROCEDURE — 99233 SBSQ HOSP IP/OBS HIGH 50: CPT | Performed by: HOSPITALIST

## 2017-04-05 PROCEDURE — 99232 SBSQ HOSP IP/OBS MODERATE 35: CPT | Performed by: INTERNAL MEDICINE

## 2017-04-05 RX ORDER — MAGNESIUM OXIDE 400 MG (241.3 MG MAGNESIUM) TABLET
400 TABLET ONCE
Status: COMPLETED | OUTPATIENT
Start: 2017-04-05 | End: 2017-04-05

## 2017-04-05 NOTE — PLAN OF CARE
DISCHARGE PLANNING    • Discharge to home or other facility with appropriate resources Not Progressing        MUSCULOSKELETAL - ADULT    • Return mobility to safest level of function Not Progressing    • Maintain proper alignment of affected body part Not

## 2017-04-05 NOTE — PROGRESS NOTES
Pulmonary/Critical Care Follow Up Note    HPI:   Ting Bae is a 47year old male with Patient presents with:  Cellulitis (integumentary, infectious)      PCP Anastasia Cheadle, MD  Admission Attending Maddy Barrera MD    Hospital Day #14    Pain in R l inhaler 1 puff, 1 puff, Inhalation, Daily  •  Cyclobenzaprine HCl (cyclobenzaprine) tab 10 mg, 10 mg, Oral, TID PRN  •  ferrous sulfate EC tab 325 mg, 325 mg, Oral, BID with meals  •  Normal Saline Flush 0.9 % injection 10 mL, 10 mL, Intravenous, PRN  •  S pressure 124/68, pulse 121, temperature 98.1 °F (36.7 °C), temperature source Oral, resp. rate 18, height 5' 5\" (1.651 m), weight 182 lb 8 oz (82.781 kg), SpO2 100 %.     Intake/Output Summary (Last 24 hours) at 04/05/17 1424  Last data filed at 04/05/17 1

## 2017-04-05 NOTE — PROGRESS NOTES
INFECTIOUS DISEASE PROGRESS NOTE    Gin Brooks Patient Status:  Inpatient    3/31/1965 MRN [id number]   Location [facility] 5SW/SE Attending Maddie Pineda MD   Hosp Day # 15 PCP Gurinder Camp MD     Subjective:  Patient seen and examined admitted 2/15 - 2/28 with R hip septic arthritis s/p aspiration and I&D with +S. pneumo and well as S. pneumo bacteremia. Discharged on ceftriaxone for anticipated 6 week course until anticipated 4/2/17. On 3/3/17 restarted on ART after genotype returned. likely 2/2 PICC s/p vancomycin  # Multiple joints involved including R shoulder bilateral MCP c/w polyarticular septic arthritis due to pneumococcus - improved  # HIV/AIDS on truvada, norvir, Reyataz, bactrim - non compliant              - last saw HIV pro

## 2017-04-05 NOTE — DISCHARGE PLANNING
PM&R recommending acute rehab and Cheryl Mathew to see the patient today. Update and PM&R consult sent to [place].      Diane Lopez [facility]  [phone]

## 2017-04-05 NOTE — OCCUPATIONAL THERAPY NOTE
OCCUPATIONAL THERAPY TREATMENT NOTE - INPATIENT     Room Number: 347/812-M         Presenting Problem:  (R hip resection arthroplasty)    Problem List  Principal Problem:    Pyogenic arthritis of right hip, due to unspecified organism [facility])  Active Problem Techniques:  (meds, repositioning, distraction)     ACTIVITY TOLERANCE  Activity limited by pain    ACTIVITIES OF DAILY LIVING ASSESSMENT  AM-PAC ‘6-Clicks’ Inpatient Daily Activity Short Form  How much help from another person does the patient currently n

## 2017-04-05 NOTE — PROGRESS NOTES
[place] [facility] - [facility]    Progress Note    Genevie Every Patient Status:  Inpatient    3/31/1965 MRN [id number]   Location [facility] 5SW/SE Attending Nori Balderrama MD   [address] Day # 15 PCP Dio Diaz MD     Subjective:     Con resolved.  Cardiology consultation appreciated  -Transfer to telemetry floor  -Cardiology following  - rates remain elevated at times, plan to manage pain    Hypotension  - IVF  - monitor    Anemia of Chronic disease  - hgb stable  - s/p 2 units prbc    DV Electronically signed on 04/03/2017 at 22:21 by Chuy Higgins APN  4/5/2017

## 2017-04-05 NOTE — PHYSICAL THERAPY NOTE
PHYSICAL THERAPY TREATMENT NOTE - INPATIENT    Room Number: 099/629-H       Presenting Problem: s/p R hip resection arthroplasty with placement of antibiotic beads    Problem List  Principal Problem:    Pyogenic arthritis of right hip, due to unspecified rehabilitation     PLAN  PT Treatment Plan: Bed mobility; Body mechanics; Patient education;Gait training;Transfer training;Balance training;Strengthening    SUBJECTIVE  \"Allejuah\"    OBJECTIVE  Precautions: HARSHA - anterior;HARSHA - posterior;  neede concerns addressed    CURRENT GOALS   Goals to be met by: 4/15/17  Patient Martin Cabrera Patient's self-stated goal is: \"go home\"    Goal #1  Patient is able to demonstrate supine - sit EOB @ level: supervision    Goal #1    Current Status  Min assist  --assist

## 2017-04-05 NOTE — PROGRESS NOTES
[facility]  Cardiology Progress Note    Devyn Jungshannan Patient Status:  Inpatient    3/31/1965 MRN [id number]   Location [facility] 3W/SW Attending Ryan Greco MD   Hosp Day # 15 PCP Gurinder Villalpando MD       Assessment and Plan: Epi kg)  03/22/17 1730 : 151 lb 12.8 oz (68.856 kg)  03/22/17 0943 : 150 lb (68.04 kg)  03/22/17 1104 : 149 lb 14.6 oz (68 kg)  03/06/17 0138 : 147 lb 8 oz (66.906 kg)      Tele: NSR/ST   Exam unchanged       Laboratory/Data:    Labs:         Recent Labs   Lab 10 mL Intravenous PRN   0.9%  NaCl infusion  Intravenous Once   gabapentin (NEURONTIN) cap 100 mg 100 mg Oral BID   gabapentin (NEURONTIN) cap 300 mg 300 mg Oral Nightly   umeclidinium-vilanterol (ANORO ELLIPTA) 62.5-25 MCG/INH inhaler 1 puff 1 puff Inhala ritonavir (NORVIR) tab 100 mg Oral Daily       Teri Taveras MD  4/5/2017  5:56 PM

## 2017-04-05 NOTE — PROGRESS NOTES
[place] [facility] - [facility]    Progress Note    Alpesh Kahn Patient Status:  Inpatient    3/31/1965 MRN [id number]   Location [facility] 5SW/SE Attending Timoteo Michaud MD   [address] Day # 15 PCP Veronika Fontana MD     Subjective:     Con Resolved with adenosine.  Has maintained normal sinus rhythm overnight.  Hypotension resolved. Previous echo okay   cardiology consultation appreciated  -Cardizem drip never started, heart rates still on the higher side ? Pain ?   Anxiety  -Cardiology fol

## 2017-04-05 NOTE — CONSULTS
PHYSICAL MEDICINE AND REHABILITATION CONSULTATION     CC: Impaired mobility and ADL dysfunction secondary to R hip septic arthritis s/p Gridlestone arthroplasty    HPI:  This is a 44 yo Lithuanian speaking male with history h/o HIV/AIDS, IVDA, htn, hl, svt, a Chloride 0.9 % solution      [DISCONTINUED] famoTIDine (PEPCID) tab 10 mg 10 mg Oral BID   HYDROmorphone HCl PF (DILAUDID) 1 MG/ML injection 0.2 mg 0.2 mg Intravenous Q4H PRN   Or      HYDROmorphone HCl PF (DILAUDID) 1 MG/ML injection 0.4 mg 0.4 mg Jimbo Quill Intravenous Q5 Min PRN   [DISCONTINUED] morphINE sulfate (PF) 2 MG/ML injection 2 mg 2 mg Intravenous Q10 Min PRN   [DISCONTINUED] morphINE sulfate (PF) 4 MG/ML injection 4 mg 4 mg Intravenous Q10 Min PRN   [DISCONTINUED] morphINE sulfate (PF) 10 MG/ML inj BID with meals   Normal Saline Flush 0.9 % injection 10 mL 10 mL Intravenous PRN   [] sodium chloride 0.9% IV bolus 500 mL 500 mL Intravenous Once PRN   Senna (SENOKOT) tab TABS 17.2 mg 17.2 mg Oral Nightly   docusate sodium (COLACE) cap 100 mg 100 650 mg Oral Q6H   [DISCONTINUED] TraMADol HCl (ULTRAM) tab 50 mg 50 mg Oral Q6H   OxyCODONE HCl (ROXICODONE) immediate release tab 5 mg 5 mg Oral Q4H PRN   [COMPLETED] dexamethasone Sodium Phosphate (DECADRON) 4 MG/ML injection 10 mg 10 mg Intravenous Once mg 1 mg Intravenous Q4H PRN   [DISCONTINUED] Zolpidem Tartrate (AMBIEN) tab 5 mg 5 mg Oral Nightly PRN   [DISCONTINUED] Normal Saline Flush 0.9 % injection 10 mL 10 mL Intravenous PRN   [] Lidocaine HCl (XYLOCAINE) 1 % injection 0.5 mL 0.5 mL Other tablet Oral Q4H PRN   [DISCONTINUED] HYDROcodone-acetaminophen (NORCO) 5-325 MG per tab 2 tablet 2 tablet Oral Q4H PRN   [DISCONTINUED] morphINE sulfate (PF) 2 MG/ML injection 1 mg 1 mg Intravenous Q2H PRN   [DISCONTINUED] morphINE sulfate (PF) 2 MG/ML inj • Osteoarthritis    • High cholesterol    • HTN    • Septic arthritis (HCC)      R hip   • D[facility] (deep vein thrombosis) [facility]    • S/P IVC filter 2/17   • Non compliance w medication regimen    • IVDA (intravenous drug abuse) complicating preg non-distended. No hepatomegaly appreciated.   Extrems: no clubbing/cyanosis noted in digits or nails  Skin: skin color/turgor appear normal, no subcutaneous nodules appreciated on hands  Psych: awake,alert, oriented; normal mood and affect  MSK: PROM of BUE MD  [address]

## 2017-04-06 PROCEDURE — 99233 SBSQ HOSP IP/OBS HIGH 50: CPT | Performed by: HOSPITALIST

## 2017-04-06 RX ORDER — HYDROMORPHONE HYDROCHLORIDE 1 MG/ML
0.2 INJECTION, SOLUTION INTRAMUSCULAR; INTRAVENOUS; SUBCUTANEOUS EVERY 6 HOURS PRN
Status: DISCONTINUED | OUTPATIENT
Start: 2017-04-06 | End: 2017-04-07

## 2017-04-06 RX ORDER — HYDROMORPHONE HYDROCHLORIDE 1 MG/ML
0.8 INJECTION, SOLUTION INTRAMUSCULAR; INTRAVENOUS; SUBCUTANEOUS EVERY 6 HOURS PRN
Status: DISCONTINUED | OUTPATIENT
Start: 2017-04-06 | End: 2017-04-07

## 2017-04-06 RX ORDER — OXYCODONE HYDROCHLORIDE 5 MG/1
10 TABLET ORAL EVERY 4 HOURS PRN
Status: DISCONTINUED | OUTPATIENT
Start: 2017-04-06 | End: 2017-04-10

## 2017-04-06 RX ORDER — HYDROMORPHONE HYDROCHLORIDE 1 MG/ML
0.4 INJECTION, SOLUTION INTRAMUSCULAR; INTRAVENOUS; SUBCUTANEOUS EVERY 6 HOURS PRN
Status: DISCONTINUED | OUTPATIENT
Start: 2017-04-06 | End: 2017-04-10

## 2017-04-06 NOTE — PROGRESS NOTES
[facility]    Progress Note    Alpesh Kahn Patient Status:  Inpatient    3/31/1965 MRN [id number]   Location [facility] 5SW/SE Attending Timoteo Michaud MD   Psychiatric Day # 15 PCP Veronika Fontana MD     Subjective:     Con well    Supraventricular tachycardia.  Rapid response called on 4/3/2017.  Sustained tachycardia for over an hour with some hypotension.  Resolved with adenosine.  Has maintained normal sinus rhythm overnight.  Hypotension resolved.  Cardiology consultatio

## 2017-04-06 NOTE — PROGRESS NOTES
[place] [facility] - [facility]    Progress Note    Denilson Silva Patient Status:  Inpatient    3/31/1965 MRN [id number]   Location [facility] 5SW/SE Attending Sean Peterson MD   [facility] Day # 15 PCP Rosita Santos MD     Subjective:     Con 4/3/2017.  Sustained tachycardia for over an hour with some hypotension.  Resolved with adenosine.  Has maintained normal sinus rhythm overnight.  Hypotension resolved.  Cardiology consultation appreciated  - Cardizem drip never started, heart rates still

## 2017-04-06 NOTE — PHYSICAL THERAPY NOTE
PHYSICAL THERAPY TREATMENT NOTE - INPATIENT    Room Number: 743/584-Y       Presenting Problem: s/p R hip resection arthroplasty with placement of antibiotic beads    Problem List  Principal Problem:    Pyogenic arthritis of right hip, due to unspecified HARSHA - anterior;HARSHA - posterior;  needed    WEIGHT BEARING RESTRICTION  Weight Bearing Restriction: R lower extremity        R Lower Extremity: Non-Weight Bearing       PAIN ASSESSMENT   Rating: Unable to rate  Location: R hip/RLE      Current Status   Not assessed, received sitting in bedside chair and returned to bedside chair at end of session      Goal #2   Patient is able to demonstrate transfers Sit to/from Stand at assistance level: supervision       Goal #2  Current Status   M

## 2017-04-06 NOTE — CHRONIC PAIN
[facility] - [facility]  Report of Consultation    Ting Bae Patient Status:  Inpatient    3/31/1965 MRN [id number]   Location [facility] 3W/SW Attending Prisca Chacon MD   Hosp Day # 13 PCP Anastasia Cheadle, MD     Date of Admission HYDROmorphone HCl PF (DILAUDID) 1 MG/ML injection 0.2 mg, 0.2 mg, Intravenous, Q4H PRN **OR** HYDROmorphone HCl PF (DILAUDID) 1 MG/ML injection 0.4 mg, 0.4 mg, Intravenous, Q4H PRN **OR** HYDROmorphone HCl PF (DILAUDID) 1 MG/ML injection 0.8 mg, 0.8 mg, In Q6H  •  TraMADol HCl (ULTRAM) tab 50 mg, 50 mg, Oral, Q6H PRN  •  Enoxaparin Sodium (LOVENOX) 40 MG/0.4ML injection 40 mg, 40 mg, Subcutaneous, Daily  •  scopolamine (TRANSDERM-SCOP) 1.5 mg patch, 1 patch, Transdermal, Q72H  •  Gentamicin Sulfate ([employer] [place] SARABJIT  4/6/2017  8:45 AM

## 2017-04-06 NOTE — DISCHARGE PLANNING
4/6/17 CM Discharge planning   Updated records and PMR sent to [facility]. Spoke with Cheryl villarreal at [facility], awaiting Ins authorization.     Rohit Lopez X [phone]

## 2017-04-06 NOTE — PROGRESS NOTES
[facility]  Cardiology Progress Note    Ingram Yemi Patient Status:  Inpatient    3/31/1965 MRN [id number]   Location [facility] 3W/SW Attending Laverne Handy MD   Hosp Day # 15 PCP Hipolito Chavez MD       Assessment and Plan: Epi oz (82.781 kg)  04/04/17 0600 : 184 lb 14.4 oz (83.87 kg)  03/31/17 0600 : 159 lb 6 oz (72.292 kg)  03/22/17 1730 : 151 lb 12.8 oz (68.856 kg)  03/22/17 0943 : 150 lb (68.04 kg)  03/22/17 1104 : 149 lb 14.6 oz (68 kg)  03/06/17 0138 : 147 lb 8 oz (66.906 k mg 0.8 mg Intravenous Q4H PRN   DilTIAZem HCl (CARDIZEM) injection 10 mg 10 mg Intravenous Q1H PRN   DilTIAZem HCl (CARDIZEM) 125 mg in sodium chloride 0.9 % 125 mL IVPB 2.5-20 mg/hr Intravenous Continuous   DilTIAZem HCl (CARDIZEM) tab 60 mg 60 mg Oral ID IVPB-minibag 2 g Intravenous Q24H   emtricitabine-tenofovir (TRUVADA) 200-300 MG per tab 1 tablet 1 tablet Oral Daily   Atazanavir Sulfate (REYATAZ) cap 300 mg 300 mg Oral Daily with breakfast   ritonavir (NORVIR) tab 100 mg Oral Daily       Eli Blocker,

## 2017-04-07 PROCEDURE — 99232 SBSQ HOSP IP/OBS MODERATE 35: CPT | Performed by: HOSPITALIST

## 2017-04-07 RX ORDER — CALCIUM CARBONATE 200(500)MG
500 TABLET,CHEWABLE ORAL 3 TIMES DAILY PRN
Refills: 0 | Status: ON HOLD | COMMUNITY
Start: 2017-04-07 | End: 2018-10-20

## 2017-04-07 RX ORDER — FAMOTIDINE 10 MG
10 TABLET ORAL 2 TIMES DAILY
Refills: 0 | Status: ON HOLD | COMMUNITY
Start: 2017-04-07 | End: 2018-10-20

## 2017-04-07 RX ORDER — POLYETHYLENE GLYCOL 3350 17 G/17G
17 POWDER, FOR SOLUTION ORAL DAILY PRN
Refills: 0 | Status: ON HOLD | COMMUNITY
Start: 2017-04-07 | End: 2018-10-20

## 2017-04-07 RX ORDER — ENOXAPARIN SODIUM 100 MG/ML
40 INJECTION SUBCUTANEOUS DAILY
Qty: 14 SYRINGE | Refills: 0 | Status: ON HOLD
Start: 2017-04-07 | End: 2018-10-20

## 2017-04-07 RX ORDER — TRAMADOL HYDROCHLORIDE 50 MG/1
50 TABLET ORAL EVERY 6 HOURS PRN
Qty: 30 TABLET | Refills: 0 | Status: ON HOLD | OUTPATIENT
Start: 2017-04-07 | End: 2018-10-20

## 2017-04-07 RX ORDER — OXYCODONE HYDROCHLORIDE 10 MG/1
10 TABLET ORAL EVERY 4 HOURS PRN
Qty: 30 TABLET | Refills: 0 | Status: ON HOLD | OUTPATIENT
Start: 2017-04-07 | End: 2018-10-20

## 2017-04-07 RX ORDER — GENTAMICIN SULFATE 3 MG/ML
1 SOLUTION/ DROPS OPHTHALMIC 4 TIMES DAILY
Refills: 0 | Status: ON HOLD | COMMUNITY
Start: 2017-04-07 | End: 2018-10-20

## 2017-04-07 RX ORDER — PSEUDOEPHEDRINE HCL 30 MG
100 TABLET ORAL 2 TIMES DAILY
Refills: 0 | Status: ON HOLD | COMMUNITY
Start: 2017-04-07 | End: 2018-10-20

## 2017-04-07 RX ORDER — MAGNESIUM HYDROXIDE/ALUMINUM HYDROXICE/SIMETHICONE 120; 1200; 1200 MG/30ML; MG/30ML; MG/30ML
30 SUSPENSION ORAL 4 TIMES DAILY PRN
Refills: 0 | Status: ON HOLD | COMMUNITY
Start: 2017-04-07 | End: 2018-10-20

## 2017-04-07 RX ORDER — ACETAMINOPHEN 325 MG/1
650 TABLET ORAL EVERY 6 HOURS PRN
Status: DISCONTINUED | OUTPATIENT
Start: 2017-04-07 | End: 2017-04-10

## 2017-04-07 RX ORDER — ZOLPIDEM TARTRATE 5 MG/1
5 TABLET ORAL NIGHTLY PRN
Qty: 30 TABLET | Refills: 0 | Status: SHIPPED
Start: 2017-04-07

## 2017-04-07 RX ORDER — OXYCODONE HCL 20 MG/1
20 TABLET, FILM COATED, EXTENDED RELEASE ORAL EVERY 8 HOURS
Qty: 28 TABLET | Refills: 0 | Status: ON HOLD | OUTPATIENT
Start: 2017-04-07 | End: 2018-10-20

## 2017-04-07 NOTE — PROGRESS NOTES
[facility]  Inpatient Pain Management Progress Note      Patient name: Juan Jose Bragg 47year old male  : 3/31/1965  MRN: [id number]    Diagnosis: right hip pain    Reason for Consult: right hip pain    Current hospital day: THERESA GALLO pain following increased dose of Oxycodone IR. CPM. Will sign off.      Gearl Batch  4/7/2017  Chronic Pain Service [phone]

## 2017-04-07 NOTE — PROGRESS NOTES
[facility]    Progress Note    Jose J Avis Patient Status:  Inpatient    3/31/1965 MRN [id number]   Location [facility] 5SW/SE Attending Candance Lewis, MD   [facility] Day # 12 PCP Marcela Lopez MD     Subjective:     Mundeleinlu Shah response called on 4/3/2017.  Sustained tachycardia for over an hour with some hypotension.  Resolved with adenosine.  Has maintained normal sinus rhythm overnight.  Hypotension resolved.  Cardiology consultation appreciated  - Cardizem drip never started,

## 2017-04-07 NOTE — OCCUPATIONAL THERAPY NOTE
OCCUPATIONAL THERAPY TREATMENT NOTE - INPATIENT     Room Number: 360/383-N    Presenting Problem:  (R hip resection arthroplasty)    Problem List  Principal Problem:    Pyogenic arthritis of right hip, due to unspecified organism [facility])  Active Problems: Non-Weight Bearing       PAIN ASSESSMENT  Rating:  (Did not rate)  Location:  (RT LE)  Management Techniques:  (meds, repositioning, distraction)     ACTIVITY TOLERANCE  Room air    ACTIVITIES OF DAILY LIVING ASSESSMENT  AM-PAC ‘6-Clicks’ Inpatient Daily A OTR/L  Occupational Therapist

## 2017-04-07 NOTE — OCCUPATIONAL THERAPY NOTE
Attempted OT treatment. Pt is sleeping. Will reschedule as able.      Mindi Mednoza MA, OTR/L  Occupational Therapist

## 2017-04-07 NOTE — DISCHARGE PLANNING
4/7/17 CM Discharge planning  Spoke with Jessica villarreal at [address], she has just contacted Ins no authorization yet. JERICHO to follow up on Monday.    John Oro X [phone]

## 2017-04-07 NOTE — PLAN OF CARE
Problem: PAIN - ADULT  Goal: Verbalizes/displays adequate comfort level or patient’s stated pain goal  INTERVENTIONS:  - Encourage pt to monitor pain and request assistance  - Assess pain using appropriate pain scale  - Administer analgesics based on type wound care, etc)  - Arrange for interpreters to assist at discharge as needed  - Consider post-discharge preferences of patient/family/discharge partner  - Complete POLST form as appropriate  - Assess patient’s ability to be responsible for managing their the planning and delivery of care  - Encourage patient/family to participate in care and decision-making at the level they choose  - Honor patient and family perspectives and choices   Outcome: Progressing    Problem: SKIN/TISSUE INTEGRITY - ADULT  Goal: I vasoactive medications to optimize hemodynamic stability  - Monitor arterial and/or venous puncture sites for bleeding and/or hematoma  - Assess quality of pulses, skin color and temperature  - Assess for signs of decreased coronary artery perfusion - ex.

## 2017-04-07 NOTE — PHYSICAL THERAPY NOTE
PHYSICAL THERAPY TREATMENT NOTE - INPATIENT    Room Number: 155/087-X       Presenting Problem: s/p R hip resection arthroplasty with placement of antibiotic beads    Problem List  Principal Problem:    Pyogenic arthritis of right hip, due to unspecified training;Strengthening    SUBJECTIVE  \"Seis\"    OBJECTIVE  Precautions: HARSHA - anterior;HARSHA - posterior;  needed    WEIGHT BEARING RESTRICTION  Weight Bearing Restriction: R lower extremity        R Lower Extremity: Non-Weight Bearing       PAIN 4/15/17  Patient Tony Hennessy Patient's self-stated goal is: \"go home\"      Goal #1    Patient is able to demonstrate supine - sit EOB @ level: supervision      Goal #1    Current Status    CGA to min A x 1   Goal #2    Patient is able to demonstrate transfer

## 2017-04-07 NOTE — PROGRESS NOTES
[facility]    Progress Note    Madyjeanne Lito Patient Status:  Inpatient    3/31/1965 MRN [id number]   Location [facility] 3W/SW Attending Lisa Javier MD   Hosp Day # 12 PCP Phil Us MD         Assessment and Plan: Q72H   • Gentamicin Sulfate  1 drop Both Eyes QID   • cefTRIAXone  2 g Intravenous Q24H   • emtricitabine-tenofovir  1 tablet Oral Daily   • Atazanavir Sulfate  300 mg Oral Daily with breakfast   • Ritonavir  100 mg Oral Daily             Results:     Lab

## 2017-04-07 NOTE — PROGRESS NOTES
INFECTIOUS DISEASE PROGRESS NOTE    Carrillo Amaral Patient Status:  Inpatient    3/31/1965 MRN [id number]   Location [facility] 5SW/SE Attending Claudio Moffett MD   [facility] Day # 12 PCP Rebecca Eddy MD     Subjective:  Patient seen and examined 6 week course until anticipated 4/2/17. On 3/3/17 restarted on ART after genotype returned. Re-admitted on [address] [place] [address] on 3/5/17 with bacteremia with CoNS 2 different types in 2 sets on 2/27 (S. Hominis) and 3/4/17 (CoNS).  Started on vancomycin with anticipated 2 HIV/AIDS on truvada, norvir, Reyataz, bactrim - non compliant              - last saw HIV provider 12/2016              - last labs 2/2016 with vL undetectable and CD4 50              - repeat CD4 2/15/17 13 (20%)              - restarted on ARV on 3/3/17

## 2017-04-07 NOTE — PROGRESS NOTES
[place] [facility] - [facility]    Progress Note    Eufemia Rey Patient Status:  Inpatient    3/31/1965 MRN [id number]   Location [facility] 5SW/SE Attending Diana Jean MD   [facility] Day # 12 PCP Aziza Morales MD     Subjective:     Con well    Supraventricular tachycardia.  Rapid response called on 4/3/2017.  Sustained tachycardia for over an hour with some hypotension.  Resolved with adenosine.  Has maintained normal sinus rhythm overnight.  Hypotension resolved.  Cardiology consultatio

## 2017-04-08 PROCEDURE — 99232 SBSQ HOSP IP/OBS MODERATE 35: CPT | Performed by: HOSPITALIST

## 2017-04-08 NOTE — DISCHARGE PLANNING
SW informed that the patient is stable for d/c today. Per RONAK note, no authorization until Monday, RONAK however calling to confirm this. Message left for [facility] admissions.     Rufino Sotomayor [facility]  [id number]

## 2017-04-08 NOTE — PROGRESS NOTES
[facility]    Progress Note    Gin Brooks Patient Status:  Inpatient    3/31/1965 MRN [id number]   Location [facility] 5SW/SE Attending Maddie Pineda MD   [facility] Day # 16 PCP Gurinder Camp MD     Subjective:     Con well    Supraventricular tachycardia.  Rapid response called on 4/3/2017.  Sustained tachycardia for over an hour with some hypotension.  Resolved with adenosine.  Has maintained normal sinus rhythm overnight.  Hypotension resolved.  Cardiology consultatio

## 2017-04-09 PROCEDURE — 99232 SBSQ HOSP IP/OBS MODERATE 35: CPT | Performed by: HOSPITALIST

## 2017-04-09 RX ORDER — METOPROLOL SUCCINATE 25 MG/1
25 TABLET, EXTENDED RELEASE ORAL
Status: DISCONTINUED | OUTPATIENT
Start: 2017-04-09 | End: 2017-04-10

## 2017-04-09 NOTE — PROGRESS NOTES
[place] [facility] - [facility]    Progress Note    Diaz Villegas Patient Status:  Inpatient    3/31/1965 MRN [id number]   Location [facility] 5SW/SE Attending Nathaly Vo MD   [facility] Day # 25 PCP Junior Ellis MD     Subjective:     Con well    Supraventricular tachycardia.  Rapid response called on 4/3/2017.  Sustained tachycardia for over an hour with some hypotension.  Resolved with adenosine.  Has maintained normal sinus rhythm overnight.  Hypotension resolved.  Cardiology consultatio

## 2017-04-09 NOTE — PLAN OF CARE
Patient having more pain tonight than day shift RN stated he had earlier. Used language line to discuss pain location and discuss medications. Plan to DC to [facility] rehab on Monday. Monitoring.

## 2017-04-09 NOTE — PLAN OF CARE
Altered Communication/Language Barrier    • Patient/Family is able to understand and participate in their care Progressing          CARDIOVASCULAR - ADULT    • Maintains optimal cardiac output and hemodynamic stability Progressing    • Absence of cardiac a

## 2017-04-10 VITALS
WEIGHT: 166 LBS | HEIGHT: 65 IN | OXYGEN SATURATION: 96 % | BODY MASS INDEX: 27.66 KG/M2 | DIASTOLIC BLOOD PRESSURE: 57 MMHG | RESPIRATION RATE: 18 BRPM | TEMPERATURE: 98 F | SYSTOLIC BLOOD PRESSURE: 100 MMHG | HEART RATE: 95 BPM

## 2017-04-10 PROCEDURE — 99239 HOSP IP/OBS DSCHRG MGMT >30: CPT | Performed by: HOSPITALIST

## 2017-04-10 RX ORDER — METOPROLOL SUCCINATE 25 MG/1
25 TABLET, EXTENDED RELEASE ORAL
Qty: 30 TABLET | Refills: 0 | Status: SHIPPED
Start: 2017-04-10 | End: 2017-05-10

## 2017-04-10 NOTE — PHYSICAL THERAPY NOTE
PHYSICAL THERAPY HIP TREATMENT NOTE - INPATIENT    Room Number: 007/032-H            Presenting Problem: s/p R hip resection arthroplasty with placement of antibiotic beads    Problem List  Principal Problem:    Pyogenic arthritis of right hip, due to unsp another person does the patient currently need. ..   -   Moving to and from a bed to a chair (including a wheelchair)?: A Little   -   Need to walk in hospital room?: A Little   -   Climbing 3-5 steps with a railing?: A Lot    AM-PAC Score:  Raw Score: 17 are not an appropriate goal at this time.        Goal #4    Current Status     NT       Goal #5     Patient verbalizes and/or demonstrates all precautions and safety concerns independently.       Goal #5    Current Status     Pt able to maintain nwb on rig

## 2017-04-10 NOTE — DISCHARGE PLANNING
4/10/17  Discharge planning   Spoke with Nilton villarreal at [facility], they have accepted pt and bed available today at 6:00 pm, RN report [phone]. Met with pt he agreed to above rehab transfer and will contact his wife.   Transport arranged via S

## 2017-04-10 NOTE — DISCHARGE SUMMARY
[facility] HOSPITALIST  DISCHARGE SUMMARY     Gin Brooks Patient Status:  Inpatient    3/31/1965 MRN [id number]   [facility] 3W/SW Attending Ladarius Schulte MD   Hosp Day # 23 PCP Gurinder Camp MD     Date of Admission: 3/22/201 pronounced marked surrounding deep and soft tissue edema, chronic tearing of the right gluteus minimus and medius related to the inflammatory reaction, more extensive edema in the proximal thigh musculature.  There is a 4.7 x 6.4 x 3.8 cm new complex fluid the medial and lateral sides through the compartment musculature, as outlined above via an MRI report.   - ID on consult  - f/u cultures  - pain control- OxyContin 20mg q8, prn dilaudid, ultram and oxy IR  - limit dilaudid use  - cont IV vanc and rocephin- Prescription details    Alum & Mag Hydroxide-Simeth [phone] MG/5ML Susp   Commonly known as:  MAALOX        Take 30 mL by mouth 4 (four) times daily as needed for Indigestion.     Refills:  0       Calcium Carbonate Antacid 500 MG Chew   Commonly known a chloride 0.9 % SOLN 100 mL with CefTRIAXone Sodium 2 g SOLR 2 g   Last time this was given:  2,000 mg on 4/9/2017  8:07 PM   Replaces:  sodium chloride 0.9 % SOLN 100 mL with CefTRIAXone Sodium 1 g SOLR 1 g        Inject 2 g into the vein daily.     Emry Prima time this was given:  40 mg on 4/10/2017  9:18 AM   Commonly known as:  LOVENOX   What changed:    - medication strength  - how much to take  - when to take this  - Another medication with the same name was removed.  Continue taking this medication, and fol medication, and follow the directions you see here. Take 100 mg by mouth daily.     Refills:  0       REYATAZ 300 MG Caps   Last time this was given:  300 mg on 4/10/2017  9:27 AM   Generic drug:  Atazanavir Sulfate   What changed:  Another medicatio available     !  Ask your nurse or doctor about these medications    - Enoxaparin Sodium 40 MG/0.4ML Soln  - Metoprolol Succinate ER 25 MG Tb24  - sodium chloride 0.9 % SOLN 100 mL with CefTRIAXone Sodium 2 g SOLR 2 g  - Zolpidem Tartrate 5 MG Tabs

## 2017-04-10 NOTE — OCCUPATIONAL THERAPY NOTE
OCCUPATIONAL THERAPY TREATMENT NOTE - INPATIENT     Room Number: 660/338-B         Presenting Problem:  (R hip resection arthroplasty)    Problem List  Principal Problem:    Pyogenic arthritis of right hip, due to unspecified organism [facility])  Active Problem person does the patient currently need…  -   Putting on and taking off regular lower body clothing?: A Lot  -   Bathing (including washing, rinsing, drying)?: A Lot  -   Toileting, which includes using toilet, bedpan or urinal? : A Little  -   Putting on a

## 2017-04-10 NOTE — PROGRESS NOTES
INFECTIOUS DISEASE PROGRESS NOTE    Trinidad Stanford Patient Status:  Inpatient    3/31/1965 MRN [id number]   Location [facility] 5SW/SE Attending Mady Wagoner MD   [address] Day # 23 PCP Harleen Rodgers MD     Subjective:  Patient seen and examined ([facility]) and 3/4/17 (CoNS). Started on vancomycin with anticipated 2 week course. Thought to be PICC related and concern for possible PICC use for IV drugs. Also with RLE DVT s/p IVC filter 2/2 R hip hematoma.  Transferred to [facility] on 3/11/17 48              - repeat CD4 2/15/17 13 (20%)              - restarted on ARV on 3/3/17 - genotype wild type off meds  # Arthritis  # IVDU with Heroine apparently last in ? January 2017  # Leukopenia, neutropenia noted - likely 2/2 HIV/AIDS and medications

## 2017-04-10 NOTE — DISCHARGE PLANNING
4/10/17  Discharge planning  Spoke with Cheryl villarreal at [address]. Ins has declined acute rehab after peer to peer review, recommending sub acute rehab.   Referral and medical records faxed to [facility], also requested NH DON from [facility]

## 2017-04-19 ENCOUNTER — HOSPITAL ENCOUNTER (EMERGENCY)
Facility: HOSPITAL | Age: 52
Discharge: HOME | End: 2017-04-20
Attending: EMERGENCY MEDICINE
Payer: MEDICAID

## 2017-04-19 ENCOUNTER — APPOINTMENT (OUTPATIENT)
Dept: GENERAL RADIOLOGY | Facility: HOSPITAL | Age: 52
End: 2017-04-19
Attending: EMERGENCY MEDICINE
Payer: MEDICAID

## 2017-04-19 DIAGNOSIS — M25.551 RIGHT HIP PAIN: Primary | ICD-10-CM

## 2017-04-19 PROCEDURE — 96374 THER/PROPH/DIAG INJ IV PUSH: CPT

## 2017-04-19 PROCEDURE — 99284 EMERGENCY DEPT VISIT MOD MDM: CPT

## 2017-04-19 PROCEDURE — 73502 X-RAY EXAM HIP UNI 2-3 VIEWS: CPT

## 2017-04-19 RX ORDER — LIDOCAINE 50 MG/G
1 PATCH TOPICAL EVERY 24 HOURS
Qty: 7 PATCH | Refills: 0 | Status: SHIPPED | OUTPATIENT
Start: 2017-04-19 | End: 2017-04-26

## 2017-04-19 RX ORDER — LIDOCAINE 50 MG/G
1 PATCH TOPICAL ONCE
Status: DISCONTINUED | OUTPATIENT
Start: 2017-04-19 | End: 2017-04-20

## 2017-04-19 RX ORDER — HYDROMORPHONE HYDROCHLORIDE 1 MG/ML
0.5 INJECTION, SOLUTION INTRAMUSCULAR; INTRAVENOUS; SUBCUTANEOUS ONCE
Status: DISCONTINUED | OUTPATIENT
Start: 2017-04-19 | End: 2017-04-19

## 2017-04-19 RX ORDER — ETOMIDATE 2 MG/ML
11 INJECTION INTRAVENOUS AS NEEDED
Status: DISCONTINUED | OUTPATIENT
Start: 2017-04-19 | End: 2017-04-19

## 2017-04-19 RX ORDER — HYDROCODONE BITARTRATE AND ACETAMINOPHEN 5; 325 MG/1; MG/1
1 TABLET ORAL EVERY 6 HOURS PRN
Qty: 12 TABLET | Refills: 0 | Status: SHIPPED | OUTPATIENT
Start: 2017-04-19 | End: 2017-04-22

## 2017-04-19 RX ORDER — DOCUSATE SODIUM 100 MG/1
100 CAPSULE, LIQUID FILLED ORAL 2 TIMES DAILY PRN
Qty: 28 CAPSULE | Refills: 0 | Status: SHIPPED | OUTPATIENT
Start: 2017-04-19 | End: 2017-05-03

## 2017-04-19 RX ORDER — HYDROMORPHONE HYDROCHLORIDE 1 MG/ML
0.5 INJECTION, SOLUTION INTRAMUSCULAR; INTRAVENOUS; SUBCUTANEOUS ONCE
Status: COMPLETED | OUTPATIENT
Start: 2017-04-19 | End: 2017-04-19

## 2017-04-20 VITALS
DIASTOLIC BLOOD PRESSURE: 88 MMHG | BODY MASS INDEX: 20.83 KG/M2 | SYSTOLIC BLOOD PRESSURE: 110 MMHG | HEIGHT: 64 IN | WEIGHT: 122 LBS | RESPIRATION RATE: 18 BRPM | OXYGEN SATURATION: 98 % | HEART RATE: 84 BPM | TEMPERATURE: 98 F

## 2017-04-20 NOTE — ED INITIAL ASSESSMENT (HPI)
EMS states that the patient C/O Rt hip pain. Had an ORIF # WEEKS AGO AND FELT A \" POP\" TODAY WHILE SITTING\".  rT LEG ROTATED AND SHORTENED

## 2017-04-20 NOTE — ED PROVIDER NOTES
Patient Seen in: [facility] AND [facility] Emergency Department    History   Patient presents with:  Musculoskeletal Problem    Stated Complaint: right hip pain      HPI    48 yo M with PMH AIDS/HIV, right hip septic arthritis s/p arthrotomy of same and abx spacer into the vein daily. Zolpidem Tartrate 5 MG Oral Tab,  Take 1 tablet (5 mg total) by mouth nightly as needed for Sleep.   docusate sodium 100 MG Oral Cap,  Take 100 mg by mouth 2 (two) times daily.    PEG 3350 Oral Powd Pack,  Take 17 g by mouth daily as Smoker                   Packs/Day: 0.00  Years:           Quit date: 03/22/2016    Smokeless Status: Former User                       Comment: STARTED WHEN HE WAS 12YEARS OLD      Review of Systems :  Constitutional: As per HPI  Musculoskeletal: Negativ electronically signed and verified by the Radiologist whose name is printed above.     DD:  04/19/2017/DT:  04/19/2017     This report contains privileged and confidential information and is intended solely for the use of the individual or entity to which i Discharge Medication List    START taking these medications    lidocaine (LIDODERM) 5 % External Patch  Place 1 patch onto the skin daily.   Qty: 7 patch Refills: 0    HYDROcodone-acetaminophen 5-325 MG Oral Tab  Take 1 tablet by mouth every 6 (six) hours a

## 2017-05-11 ENCOUNTER — HOSPITAL ENCOUNTER (EMERGENCY)
Facility: HOSPITAL | Age: 52
Discharge: HOME | End: 2017-05-11
Attending: EMERGENCY MEDICINE
Payer: COMMERCIAL

## 2017-05-11 ENCOUNTER — APPOINTMENT (OUTPATIENT)
Dept: GENERAL RADIOLOGY | Facility: HOSPITAL | Age: 52
End: 2017-05-11
Attending: EMERGENCY MEDICINE
Payer: COMMERCIAL

## 2017-05-11 ENCOUNTER — APPOINTMENT (OUTPATIENT)
Dept: ULTRASOUND IMAGING | Facility: HOSPITAL | Age: 52
End: 2017-05-11
Attending: EMERGENCY MEDICINE
Payer: COMMERCIAL

## 2017-05-11 VITALS
TEMPERATURE: 98 F | RESPIRATION RATE: 15 BRPM | DIASTOLIC BLOOD PRESSURE: 77 MMHG | HEART RATE: 104 BPM | SYSTOLIC BLOOD PRESSURE: 115 MMHG | OXYGEN SATURATION: 97 %

## 2017-05-11 DIAGNOSIS — I82.501 CHRONIC DEEP VEIN THROMBOSIS (DVT) OF RIGHT LOWER EXTREMITY, UNSPECIFIED VEIN (HCC): Primary | ICD-10-CM

## 2017-05-11 PROCEDURE — 93971 EXTREMITY STUDY: CPT | Performed by: EMERGENCY MEDICINE

## 2017-05-11 PROCEDURE — 73560 X-RAY EXAM OF KNEE 1 OR 2: CPT | Performed by: EMERGENCY MEDICINE

## 2017-05-11 PROCEDURE — 96372 THER/PROPH/DIAG INJ SC/IM: CPT

## 2017-05-11 PROCEDURE — 99285 EMERGENCY DEPT VISIT HI MDM: CPT

## 2017-05-11 PROCEDURE — 73610 X-RAY EXAM OF ANKLE: CPT | Performed by: EMERGENCY MEDICINE

## 2017-05-11 RX ORDER — ENOXAPARIN SODIUM 100 MG/ML
60 INJECTION SUBCUTANEOUS ONCE
Status: COMPLETED | OUTPATIENT
Start: 2017-05-11 | End: 2017-05-11

## 2017-05-11 RX ORDER — ENOXAPARIN SODIUM 100 MG/ML
60 INJECTION SUBCUTANEOUS 2 TIMES DAILY
Qty: 60 SYRINGE | Refills: 0 | Status: ON HOLD | OUTPATIENT
Start: 2017-05-11 | End: 2018-10-20

## 2017-05-11 NOTE — CM/SW NOTE
Contacted [facility] to inquire about d/c plan from 5/1 from their facility. I was informed patient left AMA. I spoke with his wife and she is coming in 20 minutes to  her . Will use laguange line for d/c instructions to insure understanding.  RN, Hoa Montgomery

## 2017-09-22 ENCOUNTER — TELEPHONE (OUTPATIENT)
Dept: INTERVENTIONAL RADIOLOGY/VASCULAR | Facility: HOSPITAL | Age: 52
End: 2017-09-22

## 2017-09-22 NOTE — TELEPHONE ENCOUNTER
I called patient via [employer] and spoke to patient's wife. Called patient to schedule appt for IVC Filter removal and office follow up visit.   Per patient's wife, patient was driving on a suspended licence and was incarcerated for 7 month

## 2018-03-28 NOTE — PROGRESS NOTES
[facility]    Progress Note    Emma Reed Patient Status:  Inpatient    3/31/1965 MRN [id number]   Location [facility] 2W/SW Attending Jun Graff MD   [facility] Day # 15 PCP Adilson Murrell MD     Subjective:   Subjective: 03/22/2017   TSH 5.51 04/03/2017   ESRML 92* 03/22/2017   CRP 9.0* 03/22/2017   MG 2.0 04/04/2017   PHOS 3.9 04/04/2017   TROP 0.02 04/04/2017   B12 686 02/22/2017           Ekg 12-lead    4/3/2017  ECG Report  Interpretation  -------------------------- Si CRF (V): +DM  Fluid overload (pleural and pericardial effusions)==> resolved  - HD tomorrow  - scheduled for AVF today    Anemia: r/o GI blood loss  - MARGI at HD  - trend H/H (target Hgb=10.0)  - PRBCs as needed  - GI to schedule colonoscopy    RO: low phos noted now  - hold binders  - cont Rocaltrol  - trend phosphorous

## 2018-09-21 ENCOUNTER — HOSPITAL ENCOUNTER (EMERGENCY)
Facility: HOSPITAL | Age: 53
Discharge: HOME | End: 2018-09-22
Attending: EMERGENCY MEDICINE
Payer: COMMERCIAL

## 2018-09-21 DIAGNOSIS — K20.90 ESOPHAGITIS: ICD-10-CM

## 2018-09-21 DIAGNOSIS — B35.3 TINEA PEDIS OF LEFT FOOT: ICD-10-CM

## 2018-09-21 DIAGNOSIS — G44.209 TENSION HEADACHE: Primary | ICD-10-CM

## 2018-09-21 PROCEDURE — 96360 HYDRATION IV INFUSION INIT: CPT

## 2018-09-21 PROCEDURE — 99284 EMERGENCY DEPT VISIT MOD MDM: CPT

## 2018-09-22 ENCOUNTER — APPOINTMENT (OUTPATIENT)
Dept: CT IMAGING | Facility: HOSPITAL | Age: 53
End: 2018-09-22
Attending: EMERGENCY MEDICINE
Payer: COMMERCIAL

## 2018-09-22 VITALS
WEIGHT: 194.88 LBS | OXYGEN SATURATION: 95 % | TEMPERATURE: 98 F | SYSTOLIC BLOOD PRESSURE: 130 MMHG | RESPIRATION RATE: 18 BRPM | BODY MASS INDEX: 27.9 KG/M2 | HEIGHT: 70 IN | HEART RATE: 100 BPM | DIASTOLIC BLOOD PRESSURE: 82 MMHG

## 2018-09-22 LAB
ANION GAP SERPL CALC-SCNC: 6 MMOL/L (ref 0–18)
BASOPHILS # BLD: 0.1 K/UL (ref 0–0.2)
BASOPHILS NFR BLD: 2 %
BUN SERPL-MCNC: 20 MG/DL (ref 8–20)
BUN/CREAT SERPL: 15.4 (ref 10–20)
CALCIUM SERPL-MCNC: 8.7 MG/DL (ref 8.5–10.5)
CHLORIDE SERPL-SCNC: 104 MMOL/L (ref 95–110)
CO2 SERPL-SCNC: 26 MMOL/L (ref 22–32)
CREAT SERPL-MCNC: 1.3 MG/DL (ref 0.5–1.5)
EOSINOPHIL # BLD: 0.2 K/UL (ref 0–0.7)
EOSINOPHIL NFR BLD: 5 %
ERYTHROCYTE [DISTWIDTH] IN BLOOD BY AUTOMATED COUNT: 13 % (ref 11–15)
GLUCOSE SERPL-MCNC: 117 MG/DL (ref 70–99)
HCT VFR BLD AUTO: 44.7 % (ref 41–52)
HGB BLD-MCNC: 15.3 G/DL (ref 13.5–17.5)
LYMPHOCYTES # BLD: 0.5 K/UL (ref 1–4)
LYMPHOCYTES NFR BLD: 11 %
MCH RBC QN AUTO: 32.2 PG (ref 27–32)
MCHC RBC AUTO-ENTMCNC: 34.2 G/DL (ref 32–37)
MCV RBC AUTO: 94 FL (ref 80–100)
MONOCYTES # BLD: 0.6 K/UL (ref 0–1)
MONOCYTES NFR BLD: 12 %
NEUTROPHILS # BLD AUTO: 3.3 K/UL (ref 1.8–7.7)
NEUTROPHILS NFR BLD: 71 %
OSMOLALITY UR CALC.SUM OF ELEC: 286 MOSM/KG (ref 275–295)
PLATELET # BLD AUTO: 238 K/UL (ref 140–400)
PMV BLD AUTO: 6.6 FL (ref 7.4–10.3)
POTASSIUM SERPL-SCNC: 4.2 MMOL/L (ref 3.3–5.1)
RBC # BLD AUTO: 4.76 M/UL (ref 4.5–5.9)
SODIUM SERPL-SCNC: 136 MMOL/L (ref 136–144)
TSH SERPL-ACNC: 2.5 UIU/ML (ref 0.45–5.33)
WBC # BLD AUTO: 4.6 K/UL (ref 4–11)

## 2018-09-22 PROCEDURE — 80048 BASIC METABOLIC PNL TOTAL CA: CPT | Performed by: EMERGENCY MEDICINE

## 2018-09-22 PROCEDURE — 85025 COMPLETE CBC W/AUTO DIFF WBC: CPT | Performed by: EMERGENCY MEDICINE

## 2018-09-22 PROCEDURE — 84443 ASSAY THYROID STIM HORMONE: CPT | Performed by: EMERGENCY MEDICINE

## 2018-09-22 PROCEDURE — 70450 CT HEAD/BRAIN W/O DYE: CPT | Performed by: EMERGENCY MEDICINE

## 2018-09-22 RX ORDER — RANITIDINE 150 MG/1
150 TABLET ORAL EVERY 12 HOURS
Qty: 20 TABLET | Refills: 0 | Status: SHIPPED | OUTPATIENT
Start: 2018-09-22 | End: 2018-10-02

## 2018-09-22 RX ORDER — CLOTRIMAZOLE AND BETAMETHASONE DIPROPIONATE 10; .64 MG/G; MG/G
1 CREAM TOPICAL 2 TIMES DAILY
Qty: 45 G | Refills: 2 | Status: SHIPPED | OUTPATIENT
Start: 2018-09-22

## 2018-09-22 RX ORDER — SODIUM CHLORIDE 9 MG/ML
125 INJECTION, SOLUTION INTRAVENOUS CONTINUOUS
Status: DISCONTINUED | OUTPATIENT
Start: 2018-09-22 | End: 2018-09-22

## 2018-09-22 NOTE — ED NOTES
Pt states having blurry vision, has a infection in his lt great toe, fatigue. H/o HIV, Rt hip surgery in 2017.

## 2018-09-25 NOTE — ED PROVIDER NOTES
Patient Seen in: [facility] AND [facility] Emergency Department    History   Patient presents with:  Fatigue (constitutional, neurologic)    Stated Complaint: Post procedure fatigue     HPI    Patient complains of being more tired with bi frontal headache for th Tab,  Take 1 tablet (10 mg total) by mouth every 4 (four) hours as needed. OxyCODONE HCl ER 20 MG Oral Tablet Extended Release 12 hour Abuse-Deterrent,  Take 1 tablet (20 mg total) by mouth every 8 (eight) hours.    Alum & Mag Hydroxide-[facility] [phone] 62.5-25 MCG/INH Inhalation Aerosol Powder, Breath Activated,  Inhale 1 puff into the lungs daily. acetaminophen 325 MG Oral Tab,  Take 2 tablets (650 mg total) by mouth every 6 (six) hours as needed.    Cyclobenzaprine HCl 10 MG Oral Tab,  Take 10 mg by m intact, no focal deficit noted  SKIN: r great toenail no longer present, erythematous slight yellowing of skin on toe c/w tinea  PSYCH: calm, cooperative,    fatigu with headache consider neoplasm vs. Hypothyroid vs. Viral syndrome.     ED Course     Labs R Mary Jane Moya  [phone]. [phone]  [phone]            Medications Prescribed:  Discharge Medication List as of 9/22/2018  2:22 AM    START taking these medications    clotrimazole-betamethasone 1-0.05 % External Cream  Apply 1 Application topically 2 (two) t

## 2018-10-03 ENCOUNTER — HOSPITAL ENCOUNTER (EMERGENCY)
Facility: HOSPITAL | Age: 53
Discharge: HOME | End: 2018-10-03
Payer: COMMERCIAL

## 2018-10-03 VITALS
DIASTOLIC BLOOD PRESSURE: 84 MMHG | SYSTOLIC BLOOD PRESSURE: 124 MMHG | TEMPERATURE: 98 F | HEART RATE: 91 BPM | RESPIRATION RATE: 19 BRPM | WEIGHT: 192 LBS | OXYGEN SATURATION: 97 % | HEIGHT: 70 IN | BODY MASS INDEX: 27.49 KG/M2

## 2018-10-03 DIAGNOSIS — K20.90 ESOPHAGITIS: Primary | ICD-10-CM

## 2018-10-03 DIAGNOSIS — B20 HIV (HUMAN IMMUNODEFICIENCY VIRUS INFECTION) (HCC): ICD-10-CM

## 2018-10-03 PROCEDURE — 99284 EMERGENCY DEPT VISIT MOD MDM: CPT

## 2018-10-03 PROCEDURE — 87430 STREP A AG IA: CPT

## 2018-10-03 RX ORDER — SUCRALFATE ORAL 1 G/10ML
1 SUSPENSION ORAL
Qty: 200 ML | Refills: 0 | Status: SHIPPED | OUTPATIENT
Start: 2018-10-03

## 2018-10-03 RX ORDER — PANTOPRAZOLE SODIUM 40 MG/1
40 TABLET, DELAYED RELEASE ORAL DAILY
Qty: 30 TABLET | Refills: 0 | Status: SHIPPED | OUTPATIENT
Start: 2018-10-03 | End: 2018-10-03

## 2018-10-03 RX ORDER — FAMOTIDINE 20 MG/1
20 TABLET ORAL 2 TIMES DAILY PRN
Qty: 30 TABLET | Refills: 0 | Status: SHIPPED | OUTPATIENT
Start: 2018-10-03 | End: 2018-11-02

## 2018-10-03 NOTE — ED INITIAL ASSESSMENT (HPI)
C/o sore throat, chills. Denies fevers, cp, sob or n/v since September.  Here 1 week ago and given medication for GERD with no relief

## 2018-10-04 NOTE — ED PROVIDER NOTES
Patient Seen in: [facility] AND [facility] Emergency Department    History   Patient presents with:  Sore Throat    Stated Complaint: sore throat    Patient presents into the emergency room for evaluation of a sore throat.   Patient states the onset of these symp department in september                Past Medical History:   Diagnosis Date   • Arrhythmia    • Arthritis    • Asthma    • DVT (deep vein thrombosis) in pregnancy ([facility]    • High cholesterol    • HIV (human immunodeficiency virus infection) ([facility]    • HI 10\")   Wt 87.1 kg   SpO2 97%   BMI 27.55 kg/m²         Physical Exam   Constitutional: He is oriented to person, place, and time. He appears well-developed and well-nourished. No distress. HENT:   Head: Normocephalic and atraumatic.    Right Ear: Externa performed during the hospital encounter of 10/03/18   [address] [place] [address] POCT RAPID STREP    Collection Time: 10/03/18  8:32 PM   Result Value Ref Range    POCT Rapid Strep Negative Negative     9:17 PM  Patient was notified he will be placed on a course of Pepcid, as we

## 2018-10-17 ENCOUNTER — APPOINTMENT (OUTPATIENT)
Dept: GENERAL RADIOLOGY | Facility: HOSPITAL | Age: 53
End: 2018-10-17
Attending: EMERGENCY MEDICINE
Payer: COMMERCIAL

## 2018-10-17 ENCOUNTER — HOSPITAL ENCOUNTER (EMERGENCY)
Facility: HOSPITAL | Age: 53
Discharge: HOME | End: 2018-10-17
Attending: EMERGENCY MEDICINE
Payer: COMMERCIAL

## 2018-10-17 VITALS
TEMPERATURE: 99 F | RESPIRATION RATE: 18 BRPM | HEART RATE: 110 BPM | DIASTOLIC BLOOD PRESSURE: 72 MMHG | BODY MASS INDEX: 26.48 KG/M2 | OXYGEN SATURATION: 96 % | HEIGHT: 70 IN | SYSTOLIC BLOOD PRESSURE: 107 MMHG | WEIGHT: 185 LBS

## 2018-10-17 DIAGNOSIS — I47.1 SVT (SUPRAVENTRICULAR TACHYCARDIA) (HCC): Primary | ICD-10-CM

## 2018-10-17 PROCEDURE — 93010 ELECTROCARDIOGRAM REPORT: CPT | Performed by: EMERGENCY MEDICINE

## 2018-10-17 PROCEDURE — 96375 TX/PRO/DX INJ NEW DRUG ADDON: CPT

## 2018-10-17 PROCEDURE — 83735 ASSAY OF MAGNESIUM: CPT | Performed by: EMERGENCY MEDICINE

## 2018-10-17 PROCEDURE — 71045 X-RAY EXAM CHEST 1 VIEW: CPT | Performed by: EMERGENCY MEDICINE

## 2018-10-17 PROCEDURE — 80048 BASIC METABOLIC PNL TOTAL CA: CPT | Performed by: EMERGENCY MEDICINE

## 2018-10-17 PROCEDURE — 96374 THER/PROPH/DIAG INJ IV PUSH: CPT

## 2018-10-17 PROCEDURE — 85025 COMPLETE CBC W/AUTO DIFF WBC: CPT | Performed by: EMERGENCY MEDICINE

## 2018-10-17 PROCEDURE — 99285 EMERGENCY DEPT VISIT HI MDM: CPT

## 2018-10-17 PROCEDURE — 84443 ASSAY THYROID STIM HORMONE: CPT | Performed by: EMERGENCY MEDICINE

## 2018-10-17 PROCEDURE — 93005 ELECTROCARDIOGRAM TRACING: CPT

## 2018-10-17 RX ORDER — METOPROLOL TARTRATE 5 MG/5ML
5 INJECTION INTRAVENOUS ONCE
Status: COMPLETED | OUTPATIENT
Start: 2018-10-17 | End: 2018-10-17

## 2018-10-17 RX ORDER — DILTIAZEM HYDROCHLORIDE 5 MG/ML
INJECTION INTRAVENOUS
Status: COMPLETED
Start: 2018-10-17 | End: 2018-10-17

## 2018-10-17 RX ORDER — ADENOSINE 3 MG/ML
INJECTION, SOLUTION INTRAVENOUS
Status: DISCONTINUED
Start: 2018-10-17 | End: 2018-10-17

## 2018-10-17 RX ORDER — DILTIAZEM HYDROCHLORIDE 5 MG/ML
10 INJECTION INTRAVENOUS ONCE
Status: COMPLETED | OUTPATIENT
Start: 2018-10-17 | End: 2018-10-17

## 2018-10-17 NOTE — ED PROVIDER NOTES
Patient Seen in: [facility] AND [facility] Emergency Department    History   Patient presents with:  Arrythmia/Palpitations (cardiovascular)    Stated Complaint: Chest Pain     HPI    Patient complains of rapid heart rate.   States about an hour prior to arrival (60 mg total) into the skin 2 (two) times daily. OxyCODONE HCl 10 MG Oral Tab,  Take 1 tablet (10 mg total) by mouth every 4 (four) hours as needed.    OxyCODONE HCl ER 20 MG Oral Tablet Extended Release 12 hour Abuse-Deterrent,  Take 1 tablet (20 mg tota Cap,  Take 100 mg by mouth 2 (two) times daily. umeclidinium-vilanterol 62.5-25 MCG/INH Inhalation Aerosol Powder, Breath Activated,  Inhale 1 puff into the lungs daily.    acetaminophen 325 MG Oral Tab,  Take 2 tablets (650 mg total) by mouth every 6 (si Tacky, regular GI: abdomen is soft and non tender, no masses, nl bowel sounds   EXTREMITIES: from, 5/5 strength in all 4 ext, no edema  NEURO: alert and oiented *3, 2-12 intact, no focal deficit noted  SKIN: good skin turgor, no  rashes  PSYCH: calm, coope followup CT scanning. Mild bibasilar scarring/atelectasis. Dictated by (CST): Megan Gaviria MD on 10/17/2018 at 15:10     Approved by (CST): Megan Gaviria MD on 10/17/2018 at 15:15            Critical Care:   I spent a total of 38 minutes of critical

## 2018-10-19 ENCOUNTER — APPOINTMENT (OUTPATIENT)
Dept: CT IMAGING | Facility: HOSPITAL | Age: 53
DRG: 885 | End: 2018-10-19
Attending: EMERGENCY MEDICINE
Payer: COMMERCIAL

## 2018-10-19 ENCOUNTER — APPOINTMENT (OUTPATIENT)
Dept: GENERAL RADIOLOGY | Facility: HOSPITAL | Age: 53
DRG: 885 | End: 2018-10-19
Attending: EMERGENCY MEDICINE
Payer: COMMERCIAL

## 2018-10-19 PROBLEM — B20 HIV DISEASE (HCC): Status: ACTIVE | Noted: 2018-10-19

## 2018-10-19 PROBLEM — R50.9 FEVER, UNSPECIFIED FEVER CAUSE: Status: ACTIVE | Noted: 2018-10-19

## 2018-10-19 PROBLEM — M25.551 RIGHT HIP PAIN: Status: ACTIVE | Noted: 2018-10-19

## 2018-10-19 PROBLEM — F32.89 OTHER DEPRESSION: Status: ACTIVE | Noted: 2018-10-19

## 2018-10-19 PROCEDURE — 71045 X-RAY EXAM CHEST 1 VIEW: CPT | Performed by: EMERGENCY MEDICINE

## 2018-10-19 PROCEDURE — 73700 CT LOWER EXTREMITY W/O DYE: CPT | Performed by: EMERGENCY MEDICINE

## 2018-10-19 NOTE — ED NOTES
Per Dr. Mi Mares, implement air bourne precautions at this time r/t patient's symptoms. Sign placed on door, pending transfer to negative pressure room. Per MD, contacting infectious disease for plan of care.
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
## 2018-10-19 NOTE — ED INITIAL ASSESSMENT (HPI)
EMS called to [facility] due to patient making suicidal statements.  Rep[orted saying \"i dont want to live anymore\"

## 2018-10-19 NOTE — ED PROVIDER NOTES
Patient Seen in: [facility] AND [facility] Emergency Department    History   Patient presents with:  Eval-P (psychiatric)    Stated Complaint: SI    HPI    Patient is a 54-year-old Mohawk-speaking male who presents from his primary care physician for depression HPI.  Constitutional and vital signs reviewed. All other systems reviewed and negative except as noted above.     Physical Exam     ED Triage Vitals [10/19/18 1246]   /87   Pulse (!) 127   Resp 18   Temp 98.8 °F (37.1 °C)   Temp src Oral   SpO2 9 these tests on the individual orders. EKG    Rate, intervals and axes as noted on EKG Report.   Rate: 113  Rhythm: Sinus Rhythm  Reading: normal, no SARA        MDM   Pulse Ox: 99%, Normal, RA    Cardiac Monitor: Pulse Readings from Last 1 Encounters:  1

## 2018-10-20 ENCOUNTER — APPOINTMENT (OUTPATIENT)
Dept: ULTRASOUND IMAGING | Facility: HOSPITAL | Age: 53
DRG: 885 | End: 2018-10-20
Attending: HOSPITALIST
Payer: COMMERCIAL

## 2018-10-20 PROCEDURE — 93970 EXTREMITY STUDY: CPT | Performed by: HOSPITALIST

## 2018-10-20 NOTE — H&P
[facility]    PATIENT'S NAME: JUAN SCHREIBER   ATTENDING PHYSICIAN: Hunter Baron MD   PATIENT ACCOUNT#:   [id number]    LOCATION:  [place] [address]  MEDICAL RECORD #:   [id number]       YOB: 1965  ADMISSION DATE:       10/ days ago. Also, had a history of asthma. PAST SURGICAL HISTORY:  As mentioned above, right hip arthrotomy, I and D, and eventually Girdlestone resection with antibiotic bead insertion.   Last procedure was done at [facility] in March 2 erythema. No open wounds. NEUROLOGIC:  Motor and sensory intact. Cranial nerves II through XII are intact. ASSESSMENT AND PLAN:    1. Fever, of unknown source. Rule out recurrent right hip septic arthritis.     2.   Human immunodeficiency virus

## 2018-10-20 NOTE — PROGRESS NOTES
10/20/18 1558   Clinical Encounter Type   Visited With Patient not available   patient was going out for testing, try again later, patient speaks Urdu    Please follow up

## 2018-10-20 NOTE — CM/SW NOTE
SW received MDO for discharge planning. Per ED notes, Pt presented to ED due to pt making suicidal statements. Pt is currently under petition and certificate. Psych has been consulted.     Orpha Wilmer, [address]

## 2018-10-20 NOTE — PROGRESS NOTES
[place] [facility] - [facility]    Progress Note    [address] Patient Status:  Inpatient    3/31/1965 MRN [id number]   Location [facility]5W Attending Jennifer Arango, [facility] Day # 1 PCP None Pcp       Subjective:     Pt c/o right hip pain superolateral migration of the femoral shaft relative to the acetabulum. 4. No acute fracture.      Dictated by (CST): Tierney Godinez MD on 10/19/2018 at 20:25     Approved by (CST): Tierney Godinez MD on 10/19/2018 at 20:37          Us Venous Doppler Leg Bi Girdlestone resection in March 2017 with antibiotic bead insertion. Continued severe hip pain. Rule out recurrent right hip septic arthritis. Pt found to have acute right leg DVT. Pt has a hx of this.   - ID on consult  - ortho to consult  - cont azit

## 2018-10-20 NOTE — CONSULTS
[facility] ID CONSULT NOTE    Cassi Mccracken Patient Status:  Inpatient    3/31/1965 MRN [id number]   Location [facility]5W Attending Analilia Broussard MD   Hosp Day # 1 PCP None Pcp       Reason for Consultation d/t incarceration and has had on and off fevers. On admission, wbc 4.9. Lactic acid 1.2. CXR negative. Before transfer to psychiatric facility, he spiked 101.6 fever with main complaint being pain in R hip.  CT hip with resected R femoral head/neck with ant He has quit using smokeless tobacco. He reports that he does not drink alcohol or use drugs.     Allergies:  No Known Allergies    Medications:    Current Facility-Administered Medications:   •  Vancomycin HCl (VANCOCIN) 1,250 mg in sodium chloride 0.9% 500 blood.  GENITOURINARY:  No Burning on urination. NEUROLOGICAL:  No headache, dizziness, syncope, paralysis, ataxia, numbness or tingling in the extremities. MUSCULOSKELETAL:  No muscle, back pain, joint pain or stiffness.  Pos calf pain  HEMATOLOGIC:  No 3/2017 admitted with bacteremia with CoNS 2 different types in 2 sets on 2/27 (S. Hominis) and 3/4/17 (CoNS) Thought to be PICC related w/concern for possible PICC use for IV drugs. Started on vancomycin with anticipated 2 week course.   Also on that admiss changed from the [address] [facility].      # Acute fever- acute DVT vs ? resp source  # Productive cough- viral URI, pos Rhino/Enterovirus   -  CXR w/ BL scarring/atelectasis and RUL 15.9x8.5 mm stable nodule,stable from 10/2016   -  Per patient h/o TB, treated 25

## 2018-10-20 NOTE — PLAN OF CARE
Problem: Patient Centered Care  Goal: Patient preferences are identified and integrated in the patient's plan of care  Interventions:  - What would you like us to know as we care for you?   - Provide timely, complete, and accurate information to patient/fa involuntary admission procedures and rules  - Contain excessive/inappropriate behavior per unit and hospital policies  Outcome: Progressing      Comments: Received patient from ER, came in r/t depression, fever and right hip pain.  Is on isolation precautio

## 2018-10-20 NOTE — PLAN OF CARE
Problem: Patient Centered Care  Goal: Patient preferences are identified and integrated in the patient's plan of care  Interventions:  - What would you like us to know as we care for you?  Keep patient informed  - Provide timely, complete, and accurate info Remove all personal property per policy  Outcome: Progressing  Maintain sitter at bedside at this time    Problem: INVOLUNTARY ADMIT  Goal: Will cooperate with staff recommendations and doctor's orders and will demonstrate appropriate behavior  INTERVENTIO injury  INTERVENTIONS:  - Assess pt frequently for physical needs  - Identify cognitive and physical deficits and behaviors that affect risk of falls.   - [place] fall precautions as indicated by assessment.  - Educate pt/family on patient safety includin

## 2018-10-21 NOTE — PLAN OF CARE
Problem: Patient Centered Care  Goal: Patient preferences are identified and integrated in the patient's plan of care  Interventions:  - What would you like us to know as we care for you?  Keep patient informed  - Provide timely, complete, and accurate info property per policy  Outcome: Progressing  Maintain sitter at bedside at this time.     Problem: INVOLUNTARY ADMIT  Goal: Will cooperate with staff recommendations and doctor's orders and will demonstrate appropriate behavior  INTERVENTIONS:  - Treat under RN     Problem: SAFETY ADULT - FALL  Goal: Free from fall injury  INTERVENTIONS:  - Assess pt frequently for physical needs  - Identify cognitive and physical deficits and behaviors that affect risk of falls.   - [place] fall precautions as indicated by a

## 2018-10-21 NOTE — PLAN OF CARE
Problem: Patient Centered Care  Goal: Patient preferences are identified and integrated in the patient's plan of care  Interventions:  - What would you like us to know as we care for you?  Keep patient informed  - Provide timely, complete, and accurate info regarding involuntary admission procedures and rules  - Contain excessive/inappropriate behavior per unit and hospital policies  Outcome: Progressing      Problem: SKIN/TISSUE INTEGRITY - ADULT  Goal: Skin integrity remains intact  INTERVENTIONS  - Assess

## 2018-10-21 NOTE — PROGRESS NOTES
Pt  was seen in follow up . Previous notes reviewed   Continues to have loose cough, no hemoptysis  Low grade fever.  No appetite     Exam  Tm - 100.9, HR   103., /63  Lungs - end exp wheezes - mild  Cor  - no murmur,  Tachy  Abd - benign    Labs  WBC

## 2018-10-21 NOTE — CONSULTS
[facility]    PATIENT'S NAME: JUAN SCHREIBER   ATTENDING PHYSICIAN: Zana Oneill MD   CONSULTING PHYSICIAN: Jn Jackson MD   PATIENT ACCOUNT#:   [id number]    LOCATION:  5Z [address] RECORD #:   [id number]       DATE OF BIRTH: Zosyn.  ID was consulted regarding the above. PAST MEDICAL HISTORY:  HIV infection for unclear duration; unknown if he acquired it via sexual contact or illicit drug use intravenously.   He has poor compliance with antiretrovirals, being initially on Suman cultures. 2.   Sputum for AFB smear and cultures, and maintain airborne precautions. 3.   HIV viral load. 4.   Management of DVT as per primary service. 5.   Since we do not have Triumeq available, we will start dolutegravir, abacavir, and lamivud.   6.

## 2018-10-21 NOTE — PROGRESS NOTES
[place] [facility]    Progress Note    [address] Patient Status:  Inpatient    3/31/1965 MRN [id number]   Location [facility]5W Attending Ginny Molina, [address] [facility] Day # 2 PCP None Pcp       Subjective:     Pt c/o pain in his wh femoral shaft relative to the acetabulum. 4. No acute fracture.      Dictated by (CST): Cheryl Monique MD on 10/19/2018 at 20:25     Approved by (CST): Cheryl Monique MD on 10/19/2018 at 20:37          Us Venous Doppler Leg Bilat - Diag Img (cpt=[id number])    R

## 2018-10-22 ENCOUNTER — APPOINTMENT (OUTPATIENT)
Dept: GENERAL RADIOLOGY | Facility: HOSPITAL | Age: 53
DRG: 885 | End: 2018-10-22
Attending: NURSE PRACTITIONER
Payer: COMMERCIAL

## 2018-10-22 PROCEDURE — 73502 X-RAY EXAM HIP UNI 2-3 VIEWS: CPT | Performed by: NURSE PRACTITIONER

## 2018-10-22 NOTE — DIETARY NOTE
ADULT NUTRITION INITIAL ASSESSMENT    Pt is at moderate nutrition risk. Pt does not meet malnutrition criteria.       RECOMMENDATIONS TO MD:  See Nutrition Intervention     NUTRITION DIAGNOSIS/PROBLEM:  Inadequate oral intake related to decreased ability t disease ([facility]    • HTN    • IVDA (intravenous drug abuse) complicating pregnancy ([facility]    • Non compliance w medication regimen    • Osteoarthritis    • Pain and swelling of right lower extremity     hip   • S/P IVC filter 2/17   • Septic arthritis ([facility] 274*       NUTRITION RELATED PHYSICAL FINDINGS:  - Body Fat/Muscle Mass: appears Nourished per visual exam.  - Fluid Accumulation: none per RN documentation   - Skin Integrity: intact and RN documentation reviewed.   - Drake score 18    NUTRITION PRESCRIPT

## 2018-10-22 NOTE — PLAN OF CARE
Problem: Patient/Family Goals  Goal: Patient/Family Long Term Goal  Patient's Long Term Goal: discharge with appropriate resources    Interventions:  - social work consults  - psych liaison consult    - See additional Care Plan goals for specific intervent Administer analgesics based on type and severity of pain and evaluate response  - Implement non-pharmacological measures as appropriate and evaluate response  - Consider cultural and social influences on pain and pain management  - Manage/alleviate anxiety
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
## 2018-10-22 NOTE — CONSULTS
[facility]    PATIENT'S NAME: JUAN SCHREIBER   ATTENDING PHYSICIAN: Chiquis Mckee MD   CONSULTING PHYSICIAN: James Fairchild MD   PATIENT ACCOUNT#:   [id number]    LOCATION:  [address] [place] [address] #:   [id number]       DATE OF BIR refuse to go to a psychiatric hospital.  When I inquired about the symptoms of depression, the patient pretty much reported all depressive symptoms. He stated that he has been depressed for quite some time, did not even remember how long.   He said that he

## 2018-10-22 NOTE — PROGRESS NOTES
[facility]    Progress Note    [address] Patient Status:  Inpatient    3/31/1965 MRN [id number]   Location [facility]5W Attending Saige Melgoza, [address] [facility] Day # 3 PCP None Pcp       Subjective:     Afebrile now.   C/o ri time.   He says he is depressed due to his medical condition. - psych to consult  - cont sitter     Fever. Pt with AIDS. Pt has URI with rhinovirus infection - most likely cause for fever.   Pt had Girdlestone resection in March 2017 with antibiotic bead

## 2018-10-22 NOTE — PLAN OF CARE
Problem: Patient Centered Care  Goal: Patient preferences are identified and integrated in the patient's plan of care  Interventions:  - What would you like us to know as we care for you?  Keep patient informed  - Provide timely, complete, and accurate info behavior per unit and hospital policies  Outcome: Progressing      Problem: SKIN/TISSUE INTEGRITY - ADULT  Goal: Skin integrity remains intact  INTERVENTIONS  - Assess and document risk factors for pressure ulcer development  - Assess and document skin int

## 2018-10-22 NOTE — PROGRESS NOTES
[facility] ID PROGRESS NOTE    Dayton Mccracken Patient Status:  Inpatient    3/31/1965 MRN [id number]   Location [facility]5W Attending Kaiser Olson MD   Hosp Day # 3 PCP None Pcp     Subjective:  Awake, Tmax 99.2. States still with cough.  No di Fever, unspecified fever cause     HIV disease ([facility]     Right hip pain      ASSESSMENT:    Antibiotics: Zosyn, bactrim ppx, azithromycin ppx  HIV medication: Triumeq    45 yo Kazakh speaking male with history h/o HIV/AIDS with poor compliance on Reyatax, complaint being pain in R hip. CT hip with resected R femoral head/neck with antibiotic beads, reactive thickened tissue and small fluid surrounding the beads. Chronic changes and no acute fracture. RVP pos for rhino/enterovirus. Blood cx sent.  Started on fever curve, wbc. -  Reviewed labs, micro, imaging reports.  -  Case d/w patient, RN.     Salas Solorio PA-C  [facility]  [phone]  10/22/2018

## 2018-10-23 NOTE — PROGRESS NOTES
[facility] ID PROGRESS NOTE    Stoney  Colon Nawaf Patient Status:  Inpatient    3/31/1965 MRN [id number]   Location [facility]5W Attending Diana Jean MD   Hosp Day # 4 PCP None Pcp     Subjective:  Awake, Tmax 99.1. Did have chills last night.  Fe syndrome) (HCC)     SVT (supraventricular tachycardia) (HCC)     Other depression     Fever, unspecified fever cause     HIV disease ([facility]     Right hip pain      ASSESSMENT:    Antibiotics: Zosyn, bactrim ppx, azithromycin ppx  HIV medication: Triumeq CXR negative. Before transfer to psychiatric facility, he spiked 101.6 fever with main complaint being pain in R hip. CT hip with resected R femoral head/neck with antibiotic beads, reactive thickened tissue and small fluid surrounding the beads.  Chronic c at this time, currently day 5. Can likely convert to PO augmentin to complete seven day course. Continue on azithromycin and bactrim ppx.  -  Continue on Triumeq. FU viral load, still pending.  -  Follow fever curve, wbc.   -  Reviewed labs, micro, imaging

## 2018-10-23 NOTE — PLAN OF CARE
Problem: Patient Centered Care  Goal: Patient preferences are identified and integrated in the patient's plan of care  Interventions:  - What would you like us to know as we care for you?  Keep patient informed  - Provide timely, complete, and accurate info regarding involuntary admission procedures and rules  - Contain excessive/inappropriate behavior per unit and hospital policies  Outcome: Progressing      Problem: SKIN/TISSUE INTEGRITY - ADULT  Goal: Skin integrity remains intact  INTERVENTIONS  - Assess given for pain. Afebrile, vitals stable.

## 2018-10-23 NOTE — PROGRESS NOTES
[place] [facility] - [facility]    Progress Note    [address] Patient Status:  Inpatient    3/31/1965 MRN [id number]   Location [facility]5W Attending Jurgen Pete, [address] [facility] Day # 4 PCP None Pcp       Subjective:     Feeling better.   I ex to start meds for depression     Fever. Pt with AIDS. Pt has URI with rhinovirus infection - most likely cause for fever.   Pt had Girdlestone resection in March 2017 with antibiotic bead insertion.  Continued severe hip pain.   Ortho consulted and low wilson

## 2018-10-24 NOTE — PROGRESS NOTES
[facility]  Progress Note     Soy Lim [place]  : 3/31/1965    Status: Inpatient  Day #: 5    Attending: Sage Dinero MD  PCP: None Pcp      Assessment and Plan     Fever in immunocompromised patient - likely 2/2 acute rhinovirus infectio Recent Labs   Lab  10/21/18   0603  10/22/18   0536  10/23/18   0515   WBC  3.9*  3.6*  3.7*   HGB  11.5*  11.7*  11.6*   HCT  34.0*  35.3*  34.6*   PLT  334  362  380   RBC  3.81*  3.93*  3.87*   MCV  89.2  89.7  89.4   MCH  30.2  29.8  29.9   MCH Nightly   • ARIpiprazole  2 mg Oral Nightly   • docusate sodium  100 mg Oral BID   • PEG 3350  17 g Oral Daily   • enoxaparin  1 mg/kg Subcutaneous 2 times per day   • azithromycin  1,250 mg Oral Weekly   • Abacavir-Dolutegravir-Lamivud  1 tablet Oral Facundo

## 2018-10-24 NOTE — PROGRESS NOTES
[facility] ID PROGRESS NOTE    Krista Mccracken Patient Status:  Inpatient    3/31/1965 MRN [id number]   Location [facility]5W Attending Renny Delgadillo MD   Hosp Day # 5 PCP None Pcp     Subjective:  Awake, fever up to 100.8 last night with chills. HIV disease ([facility]     Right hip pain     Severe episode of recurrent major depressive disorder, without psychotic features (HCC)      ASSESSMENT:    Antibiotics: Zosyn, bactrim ppx, azithromycin ppx  HIV medication: Triumeq    47 yo Faroese speaking male w to psychiatric facility, he spiked 101.6 fever with main complaint being pain in R hip. CT hip with resected R femoral head/neck with antibiotic beads, reactive thickened tissue and small fluid surrounding the beads. Chronic changes and no acute fracture. day 6. Continue on azithromycin and bactrim ppx. Given fever last night, will repeat blood cx.  -  Continue on Triumeq. -  Follow fever curve, wbc. -  Reviewed labs, micro, imaging reports.  -  Case d/w patient, RN.     Mark Cornell PA-C  [facility]

## 2018-10-24 NOTE — PLAN OF CARE
Problem: Patient Centered Care  Goal: Patient preferences are identified and integrated in the patient's plan of care  Interventions:  - What would you like us to know as we care for you?  Keep patient informed  - Provide timely, complete, and accurate info breakdown  - Initiate interventions, skin care algorithm/standards of care as needed  Outcome: Progressing      Problem: PAIN - ADULT  Goal: Verbalizes/displays adequate comfort level or patient's stated pain goal  INTERVENTIONS:  - Encourage pt to monitor

## 2018-10-24 NOTE — PROGRESS NOTES
Deacon Palmer is a 46year old   male with history of HIV, hypertension, osteoarthritis, asthma and depression who presented with fever and tachycardia.   The patient seen today for over 40-minute, follow-up evaluation, over 50% counseli stop taking medications to kill myself? If I wanted to I would just cut my throat or something like that. \" However, he denied that he has any plan for intention to do that.      Kirstin Chavez reports numerous stressors at this time.  He was dx with HIV 20 years ago SURGERY     • HIP IRRIGATION AND [address] Right 2/19/2017    Performed by Tracy Jorge MD at [address] [place] [address] MAIN OR   • HIP REPLACEMENT SURGERY     • HIP TOTAL ARTHROPLASTY REVISION Right 3/31/2017    Performed by Davin Churchill MD at [address] [place] [address] OR   • REMOVAL O Tartrate (AMBIEN) tab 5 mg 5 mg Oral Nightly PRN   acetaminophen (TYLENOL) tab 650 mg 650 mg Oral Q4H PRN   Or      HYDROcodone-acetaminophen (NORCO) 5-325 MG per tab 1 tablet 1 tablet Oral Q4H PRN   Or      HYDROcodone-acetaminophen (NORCO) 5-325 MG per t his mood has been depressed, hopeless and irritated. Patient otherwise exhibited appropriate affect. Patient today deny any homicidal or suicidal ideation and deny any intention or plan.   Patient also deny any previous suicidal ideation or intention or p Myco tuberculosis PCR, Respiratory Once      AFB CULTURE(P) Once      AFB Smear Once      AFB CULTURE(P) Once      AFB Smear Once      AFB CULTURE(P) Once      AFB Smear Once      Mycobacterial Culture, Blood Once      Mycobacterial Culture, Blood Once

## 2018-10-24 NOTE — PLAN OF CARE
Problem: Patient Centered Care  Goal: Patient preferences are identified and integrated in the patient's plan of care  Interventions:  - What would you like us to know as we care for you?  Keep patient informed  - Provide timely, complete, and accurate info and doctor's orders and will demonstrate appropriate behavior  INTERVENTIONS:  - Treat underlying conditions and offer medication as ordered  - Educate regarding involuntary admission procedures and rules  - Contain excessive/inappropriate behavior per uni strengthening/mobility  - Encourage toileting schedule  Outcome: Progressing      Comments: Patient A&Ox4 and able to make needs known. VSS, Afebrile. Iv abx as ordered. Norco PRN for pain.  Pt self administered dulcolax suppository for constipation - no re

## 2018-10-25 NOTE — CM/SW NOTE
PT recommending 24hr/HHPT.  SW made referral to the following [facility] agencies:     Angie Foster, [address] [facility], Cameron Lizarraga, [facility], [address], Family, [place], [place], Chayito Paul, [address] [facility], [place],

## 2018-10-25 NOTE — PHYSICAL THERAPY NOTE
PHYSICAL THERAPY EVALUATION - INPATIENT     Room Number: 523/523-A  Evaluation Date: 10/25/2018  Type of Evaluation: Initial   Physician Order: PT Eval and Treat    Presenting Problem: (Depression, Weakness, h/O Rt hip resection arthroplasty)  Reason for heel down since his leg is slightly longer in front. Educated pt for AROM exs for LLE for AP, LAQ, HS, Hip adduction squeezes. Advised patient to perform LE AROM exs [id number] 2-3 times/day as instructed to improve circulation and strength.   Pt participate insertion. The patient has a long history of noncompliance and he was recently incarcerated in skilled nursing. He was released from skilled nursing on September 31.  The patient has been having intractable pain and he had interrupted physical therapy during his incarceratio at [address] MAIN OR   • REMOVAL OF LUNG,LOBECTOMY         HOME SITUATION  Type of Home: Apartment   Home Layout: One level  Stairs to Enter : 12  Railing: Yes          Lives With: Spouse(Per pt, spouse works)     Patient Owned Equipment: Rolling walker;Cane  Pa commode, etc.): A Little   -   Moving from lying on back to sitting on the side of the bed?: A Little   How much help from another person does the patient currently need. ..   -   Moving to and from a bed to a chair (including a wheelchair)?: A Little   - to patient in preparation for discharge.    Goal #5   Current Status    Goal #6    Goal #6  Current Status

## 2018-10-25 NOTE — PROGRESS NOTES
Donny Persaud is a 46year old   male with history of HIV, hypertension, osteoarthritis, asthma and depression who presented with fever and tachycardia.   The patient seen today for over 25-minute, follow-up evaluation, over 50% counseli Quit date: 3/22/2016        Years since quittin.5      Smokeless tobacco: Former User      Tobacco comment: STARTED WHEN HE WAS 12YEARS OLD    Alcohol use: No      Frequency: Never    Drug use: No      Comment: stopped a year and a half ago  10/23/2018    CREATSERUM 1.12 10/22/2018    BUN 13 10/22/2018     (L) 10/22/2018    K 4.1 10/22/2018    K 4.1 10/22/2018     10/22/2018    CO2 23 10/22/2018     (H) 10/22/2018    CA 7.8 (L) 10/22/2018    ALB 2.8 (L) 10/19/2018 depressive episode. History of panic disorder. 1. Focus on safety. Patient repeatedly deny any homicidal or suicidal ideation and deny any previous suicidal attempt or intention. Discontinue certification and sitter.   2.  Focus on education and suppo

## 2018-10-25 NOTE — BH PROGRESS NOTE
Behavioral Health Note  CHIEF COMPLAINT  Fever and tachycardia     REASON FOR ADMISSION  Fever and tachycardia  SOCIAL HISTORY  Sabine Merrill lives with his wife and daughter in a basement apartment. He was relased from care home in September.  He spent 3 months in care home This writer is using repetition, education and clarifying terms to assist with good communication. MENTAL STATUS EXAM     Appearance: normal grooming  Attitude/Coorperation: coorperative, but frustrated at times.    Behavior: No psychomotor abnormality discussed with ASTRID Berrios d/kassi the patient's concerns that he cannot return home upon discharge. Plan for possible PT/OT evel if MD in agreement.     Gege Tyler LCSW

## 2018-10-25 NOTE — PROGRESS NOTES
[facility] ID PROGRESS NOTE    Krista Laos Colon Nawaf Patient Status:  Inpatient    3/31/1965 MRN [id number]   Location [facility]5W Attending Renny Delgadillo MD   Hosp Day # 6 PCP None Pcp     Subjective:  Awake, afebrile, blood cx pending.  States having ([facility]     Right hip pain     Severe episode of recurrent major depressive disorder, without psychotic features ([facility]      ASSESSMENT:    Antibiotics: Zosyn, bactrim ppx, azithromycin ppx  HIV medication: Triumeq    47 yo Israeli speaking male with history psychiatric facility, he spiked 101.6 fever with main complaint being pain in R hip. CT hip with resected R femoral head/neck with antibiotic beads, reactive thickened tissue and small fluid surrounding the beads. Chronic changes and no acute fracture.  RVP this evening. Will DC after evening dose and monitor off IV antibiotics. Continue on azithromycin and bactrim ppx.   -  FU blood cx.  -  Continue on Triumeq. Patient to FU with ID physician upon DC.  -  Follow fever curve, wbc.   -  Reviewed labs, micro, im

## 2018-10-25 NOTE — PLAN OF CARE
Problem: Patient/Family Goals  Goal: Patient/Family Long Term Goal  Patient's Long Term Goal: discharge with appropriate resources    Interventions:  - social work consults  - psych liaison consult    - See additional Care Plan goals for specific intervent stated pain goal  INTERVENTIONS:  - Encourage pt to monitor pain and request assistance  - Assess pain using appropriate pain scale  - Administer analgesics based on type and severity of pain and evaluate response  - Implement non-pharmacological measures

## 2018-10-25 NOTE — PAYOR COMM NOTE
--------------  CONTINUED STAY REVIEW    Payor: Lorraine Jacinto  #:  [id number]  Authorization Number: Rozanne Singhnestor date: 10/19/18  Admit time: 2336    Admitting Physician: Camila Blanton MD  Attending Physician:  Mason Gongora MD  [facility] 0240 New Bag 3.375 g Intravenous Татьяна Casillas RN    10/24/2018 1824 New Bag 3.375 g Intravenous Yeni Metz RN      Sulfamethoxazole-TMP DS (BACTRIM DS) 800-160 MG per tab 1 tablet     Date Action Dose Route User    10/25/2018 1037 Given 1 tablet medication regimen     Acute deep vein thrombosis (DVT) of distal end of right lower extremity (HCC)     Acute blood loss anemia     Hypotension     Oral candidosis     Pyogenic arthritis of right hip (HCC)     Pyogenic arthritis of right hip, due to unspe medications - monitor     # Arthritis  # IVDU with Heroin     PLAN:  -  Will complete seven day course of zosyn this evening. Will DC after evening dose and monitor off IV antibiotics.  Continue on azithromycin and bactrim ppx.   -  FU blood cx.  -  Continu bowel sounds  Musculoskeletal:  No joint swelling  Extremities:  RLE edema  Neurologic:  nonfocal  Psychiatric:  Normal affect, calm and appropriate  Skin:  No rash, no lesion     Intake/Output Summary (Last 24 hours) at 10/25/2018 1322  Last data filed at

## 2018-10-25 NOTE — PROGRESS NOTES
[facility]  Progress Note     Luis Angel Lim [place]  : 3/31/1965    Status: Inpatient  Day #: 6    Attending: Jordan Palomares MD  PCP: None Pcp      Assessment and Plan     Fever in immunocompromised patient - likely 2/2 acute rhinovirus infectio 3. 6*  3.7*  4.5   HGB  11.7*  11.6*  11.4*   HCT  35.3*  34.6*  34.1*   PLT  362  380  393   RBC  3.93*  3.87*  3.81*   MCV  89.7  89.4  89.7   MCH  29.8  29.9  30.0   MCHC  33.2  33.4  33.4   RDW  12.8  12.9  13.0     Recent Labs   Lab  10/19/18   1345  1 ondansetron HCl, Zolpidem Tartrate, acetaminophen **OR** HYDROcodone-acetaminophen **OR** HYDROcodone-acetaminophen    Spent <35 minutes, <50% of the time counseling coordinating care. Discussed diagnosis, cause of fever, anticoagulation, discharge plans.

## 2018-10-26 NOTE — CM/SW NOTE
[facility] has accepted the pt. [facility] orders have been sent to [facility] via [employer]. [facility] is aware of discharge plan for today 10/26.     [facility] [address], [address] [facility]

## 2018-10-26 NOTE — DISCHARGE PLANNING
Patient instructions for discharge given via language line. Reinforced not to take coumadin until Sunday. INR will be drawn on Monday. Reinforced that I am returning his HIV meds to take home.   Instructed thru interpretor that there are 3 prescriptions

## 2018-10-26 NOTE — DISCHARGE SUMMARY
[facility]  Discharge Summary     Bevtofkassi Ledbetter  : 3/31/1965    Status: Inpatient  Day #: 7    Attending: Jordan Palomares MD  PCP: None Pcp     Date of Admission: 10/19/2018  Date of Discharge: 10/26/2018     Hospital Discharge Diagnoses respirations unlabored  Gastrointestinal:  Soft, non-tender, normal bowel sounds  Musculoskeletal:  No joint swelling  Extremities:  No edema, no cyanosis, no clubbing  Neurologic:  nonfocal  Psychiatric:  Normal affect, calm and appropriate  Skin:  No enma before meals and nightly. Quantity:  200 mL  Refills:  0     TRIUMEQ 600- MG Tabs  Generic drug:  Abacavir-Dolutegravir-Lamivud      Take 1 tablet by mouth daily.    Refills:  0     umeclidinium-vilanterol 62.5-25 MCG/INH Aepb  Commonly known as:  A

## 2018-10-26 NOTE — PROGRESS NOTES
[facility] ID PROGRESS NOTE    Stoney  Colon Nawaf Patient Status:  Inpatient    3/31/1965 MRN [id number]   Location [facility]5W Attending Diana Jean, [address] [facility] Day # 7 PCP None Pcp     Subjective:  Awake, had many questions about the PO antibioti depression     Fever, unspecified fever cause     HIV disease ([facility] [address]     Right hip pain     Severe episode of recurrent major depressive disorder, without psychotic features ([facility]      ASSESSMENT:    Antibiotics: Bactrim ppx, azithromycin ppx  Zosyn  HIV me 4.9. Lactic acid 1.2. CXR negative. Before transfer to psychiatric facility, he spiked 101.6 fever with main complaint being pain in R hip.  CT hip with resected R femoral head/neck with antibiotic beads, reactive thickened tissue and small fluid surroundin Heroin     PLAN:  -  Continue to monitor off IV antibiotics. Continue on azithromycin and bactrim ppx. Will need repeat CD4 labs as outpatient prior to discontinuation. Patient can FU with his current ID physician. -  Continue on Triumeq.    -  Follow fev

## 2018-10-26 NOTE — PLAN OF CARE
Impaired Communication    • Patient will achieve maximal communication potential Adequate for Discharge        INVOLUNTARY ADMIT    • Will cooperate with staff recommendations and doctor's orders and will demonstrate appropriate behavior Adequate for Disch

## 2018-10-26 NOTE — OCCUPATIONAL THERAPY NOTE
OCCUPATIONAL THERAPY EVALUATION - INPATIENT     Room Number: 523/523-A  Evaluation Date: 10/26/2018  Type of Evaluation: Initial  Presenting Problem: Depression, anxiety, R hip pain    Physician Order: IP Consult to Occupational Therapy  Reason for ADELINE ALLEN [facility] • Osteoarthritis    • Pain and swelling of right lower extremity     hip   • S/P IVC filter 2/17   • Septic arthritis ([address]     R hip       Past Surgical History  Past Surgical History:   Procedure Laterality Date   • HERNIA SURGERY     • HIP IRRIGATION lower body clothing?: A Little  -   Bathing (including washing, rinsing, drying)?: A Little  -   Toileting, which includes using toilet, bedpan or urinal? : None  -   Putting on and taking off regular upper body clothing?: A Little  -   Taking care of pers

## 2018-10-26 NOTE — CM/SW NOTE
Addend 255p:     New C orders w/ INR draws sent to [facility].  MD stated pt will be able to discharge today 10/26.     ----------------------------------------------------------------------------  RONAK spoke w/ Dougie Kim from [facility] who stated they a

## 2018-10-29 NOTE — PAYOR COMM NOTE
--------------  DISCHARGE REVIEW    Payor: Lorraine Jacinto  #:  [id number]  Authorization Number: Janalyn Given date: 10/19/18  Admit time:  2336  Discharge Date: 10/26/2018  6:40 PM     Admitting Physician: Lea Verde MD  Attendin Physician  Psychiatry     Lindsay Leal MD Consulting Physician  SURGERY, ORTHOPEDIC    Ulises, Armando Lin MD Consulting Physician  Psychiatry    Parveen Juan MD Consulting Physician  HOSPITALIST    Juan C Severino MD Consulting Physician  INFECTIO emtricitabine-tenofovir 200-300 MG Tabs  Commonly known as:  TRUVADA      Take 1 tablet by mouth daily.    Refills:  0     famoTIDine 20 MG Tabs  Commonly known as:  PEPCID      Take 1 tablet (20 mg total) by mouth 2 (two) times daily as needed for Heartb Medium Risk  0-28   Low Risk. TCM Follow-Up Recommendation:  LACE > 58: High Risk of readmission after discharge from the hospital.        I spent >30 minutes on this discharge, counseling patient and discussing discharge plans. All questions answered.

## 2018-10-30 ENCOUNTER — HOSPITAL ENCOUNTER (EMERGENCY)
Facility: HOSPITAL | Age: 53
Discharge: HOME | End: 2018-10-31
Attending: EMERGENCY MEDICINE
Payer: COMMERCIAL

## 2018-10-30 DIAGNOSIS — K80.20 CALCULUS OF GALLBLADDER WITHOUT CHOLECYSTITIS WITHOUT OBSTRUCTION: Primary | ICD-10-CM

## 2018-10-30 PROCEDURE — 99284 EMERGENCY DEPT VISIT MOD MDM: CPT

## 2018-10-30 PROCEDURE — 96374 THER/PROPH/DIAG INJ IV PUSH: CPT

## 2018-10-30 RX ORDER — MORPHINE SULFATE 4 MG/ML
4 INJECTION, SOLUTION INTRAMUSCULAR; INTRAVENOUS ONCE
Status: COMPLETED | OUTPATIENT
Start: 2018-10-30 | End: 2018-10-31

## 2018-10-31 ENCOUNTER — APPOINTMENT (OUTPATIENT)
Dept: CT IMAGING | Facility: HOSPITAL | Age: 53
End: 2018-10-31
Attending: EMERGENCY MEDICINE
Payer: COMMERCIAL

## 2018-10-31 VITALS
HEART RATE: 99 BPM | BODY MASS INDEX: 17.47 KG/M2 | RESPIRATION RATE: 19 BRPM | OXYGEN SATURATION: 97 % | DIASTOLIC BLOOD PRESSURE: 68 MMHG | HEIGHT: 70 IN | SYSTOLIC BLOOD PRESSURE: 106 MMHG | WEIGHT: 122 LBS | TEMPERATURE: 99 F

## 2018-10-31 PROCEDURE — 83690 ASSAY OF LIPASE: CPT | Performed by: EMERGENCY MEDICINE

## 2018-10-31 PROCEDURE — 85025 COMPLETE CBC W/AUTO DIFF WBC: CPT | Performed by: EMERGENCY MEDICINE

## 2018-10-31 PROCEDURE — 85610 PROTHROMBIN TIME: CPT | Performed by: EMERGENCY MEDICINE

## 2018-10-31 PROCEDURE — 80048 BASIC METABOLIC PNL TOTAL CA: CPT | Performed by: EMERGENCY MEDICINE

## 2018-10-31 PROCEDURE — 80076 HEPATIC FUNCTION PANEL: CPT | Performed by: EMERGENCY MEDICINE

## 2018-10-31 PROCEDURE — 81001 URINALYSIS AUTO W/SCOPE: CPT | Performed by: EMERGENCY MEDICINE

## 2018-10-31 PROCEDURE — 74177 CT ABD & PELVIS W/CONTRAST: CPT | Performed by: EMERGENCY MEDICINE

## 2018-10-31 RX ORDER — DIPHENHYDRAMINE HCL 25 MG
25 CAPSULE ORAL EVERY 6 HOURS PRN
Qty: 5 CAPSULE | Refills: 0 | Status: SHIPPED | OUTPATIENT
Start: 2018-10-31

## 2018-10-31 RX ORDER — HYDROCODONE BITARTRATE AND ACETAMINOPHEN 5; 325 MG/1; MG/1
1-2 TABLET ORAL EVERY 4 HOURS PRN
Qty: 15 TABLET | Refills: 0 | Status: SHIPPED | OUTPATIENT
Start: 2018-10-31 | End: 2018-11-07

## 2018-10-31 NOTE — ED NOTES
Pt sts that his ride is coming and he will wait for his sister on the lobby, pt is stable, ambulatory upon discharge

## 2018-10-31 NOTE — ED PROVIDER NOTES
Patient Seen in: [facility] AND [facility] Emergency Department    History   Patient presents with:  Abdomen/Flank Pain (GI/)    Stated Complaint: abd pain    HPI    77-year-old male with history of HIV, recent DVT diagnosis, on Coumadin, asthma, hypertension, stopped a year and a half ago      Review of Systems   Constitutional: Negative for appetite change, fatigue and fever. HENT: Negative for congestion, ear pain and sore throat. Eyes: Negative for pain, discharge and redness.    Respiratory: Negative fo Neurological: He is alert and oriented to person, place, and time. 5/5 strength in b/l UEs and LEs, normal sensation in all extremities, normal finger to nose b/l, normal gait, no facial asymmetry, normal speech     Skin: Skin is warm and dry.  No rash Phosphatase 58 32 - 100 U/L    Bilirubin, Total 0.4 0.3 - 1.2 mg/dL    Total Protein 6.7 5.9 - 8.4 g/dL    Albumin 2.6 (L) 3.5 - 4.8 g/dL    Bilirubin, Direct 0.1 0.0 - 0.2 mg/dL   LIPASE    Collection Time: 10/31/18 12:11 AM   Result Value Ref Range    Grayce Expose no gallbladder wall thickening or pericholecystic fluid. No biliary ductal dilatation. No evidence of bowel obstruction. Normal appendix. No free fluid or free air. IVC filter in place.   Atherosclerotic vascular disease with no abdominal aortic an lower extremity ([facility] [address] Acute blood loss anemia; Hypotension; Oral candidosis; Pyogenic arthritis of right hip [facility]  Pyogenic arthritis of right hip, due to unspecified organism [facility]); AIDS (acquired immune deficiency syndrome) ([facility] SVT (supraventricular

## 2018-10-31 NOTE — ED INITIAL ASSESSMENT (HPI)
Ate chicken soup at 1900 then at 2000 started with generalized abd pain, denies n/v/d. Took carafate and gas medicine without relief. Admitted last fri for RLE DVT.  Denies CP or SOB

## 2018-11-03 ENCOUNTER — HOSPITAL ENCOUNTER (EMERGENCY)
Facility: HOSPITAL | Age: 53
Discharge: HOME | End: 2018-11-03
Attending: EMERGENCY MEDICINE
Payer: COMMERCIAL

## 2018-11-03 ENCOUNTER — APPOINTMENT (OUTPATIENT)
Dept: CT IMAGING | Facility: HOSPITAL | Age: 53
End: 2018-11-03
Attending: EMERGENCY MEDICINE
Payer: COMMERCIAL

## 2018-11-03 ENCOUNTER — APPOINTMENT (OUTPATIENT)
Dept: GENERAL RADIOLOGY | Facility: HOSPITAL | Age: 53
End: 2018-11-03
Attending: EMERGENCY MEDICINE
Payer: COMMERCIAL

## 2018-11-03 VITALS
DIASTOLIC BLOOD PRESSURE: 85 MMHG | OXYGEN SATURATION: 98 % | HEART RATE: 125 BPM | HEIGHT: 70 IN | RESPIRATION RATE: 19 BRPM | TEMPERATURE: 99 F | WEIGHT: 182 LBS | SYSTOLIC BLOOD PRESSURE: 110 MMHG | BODY MASS INDEX: 26.05 KG/M2

## 2018-11-03 DIAGNOSIS — I47.1 SVT (SUPRAVENTRICULAR TACHYCARDIA) (HCC): Primary | ICD-10-CM

## 2018-11-03 PROCEDURE — 85610 PROTHROMBIN TIME: CPT | Performed by: EMERGENCY MEDICINE

## 2018-11-03 PROCEDURE — 93005 ELECTROCARDIOGRAM TRACING: CPT

## 2018-11-03 PROCEDURE — 84484 ASSAY OF TROPONIN QUANT: CPT | Performed by: EMERGENCY MEDICINE

## 2018-11-03 PROCEDURE — 71045 X-RAY EXAM CHEST 1 VIEW: CPT | Performed by: EMERGENCY MEDICINE

## 2018-11-03 PROCEDURE — 93010 ELECTROCARDIOGRAM REPORT: CPT | Performed by: EMERGENCY MEDICINE

## 2018-11-03 PROCEDURE — 85025 COMPLETE CBC W/AUTO DIFF WBC: CPT | Performed by: EMERGENCY MEDICINE

## 2018-11-03 PROCEDURE — 99285 EMERGENCY DEPT VISIT HI MDM: CPT

## 2018-11-03 PROCEDURE — 80048 BASIC METABOLIC PNL TOTAL CA: CPT | Performed by: EMERGENCY MEDICINE

## 2018-11-03 PROCEDURE — 71260 CT THORAX DX C+: CPT | Performed by: EMERGENCY MEDICINE

## 2018-11-03 PROCEDURE — 96374 THER/PROPH/DIAG INJ IV PUSH: CPT

## 2018-11-03 PROCEDURE — 96361 HYDRATE IV INFUSION ADD-ON: CPT

## 2018-11-03 PROCEDURE — 85730 THROMBOPLASTIN TIME PARTIAL: CPT | Performed by: EMERGENCY MEDICINE

## 2018-11-03 RX ORDER — ADENOSINE 3 MG/ML
INJECTION, SOLUTION INTRAVENOUS
Status: COMPLETED
Start: 2018-11-03 | End: 2018-11-03

## 2018-11-03 RX ORDER — METOPROLOL TARTRATE 5 MG/5ML
5 INJECTION INTRAVENOUS ONCE
Status: DISCONTINUED | OUTPATIENT
Start: 2018-11-03 | End: 2018-11-03

## 2018-11-03 RX ORDER — ADENOSINE 3 MG/ML
6 INJECTION, SOLUTION INTRAVENOUS ONCE
Status: COMPLETED | OUTPATIENT
Start: 2018-11-03 | End: 2018-11-03

## 2018-11-03 RX ORDER — SODIUM CHLORIDE 9 MG/ML
125 INJECTION, SOLUTION INTRAVENOUS CONTINUOUS
Status: DISCONTINUED | OUTPATIENT
Start: 2018-11-03 | End: 2018-11-03

## 2018-11-03 NOTE — ED PROVIDER NOTES
Patient Seen in: [facility] AND [facility] Emergency Department    History   Patient presents with:  Chest Pain Angina (cardiovascular)  Arrythmia/Palpitations (cardiovascular)    Stated Complaint: chest pain    HPI    Patient is a 58-year-old male with a histor Vitals   BP 11/03/18 1435 120/79   Pulse 11/03/18 1435 (!) 230   Resp 11/03/18 1435 20   Temp 11/03/18 1450 98.7 °F (37.1 °C)   Temp src 11/03/18 1450 Oral   SpO2 11/03/18 1435 100 %   O2 Device 11/03/18 1435 None (Room air)       Current:/85   Pulse PROTHROMBIN TIME (PT) - Abnormal; Notable for the following components:    PT 18.9 (*)     INR 1.6 (*)     All other components within normal limits   CBC W/ DIFFERENTIAL - Abnormal; Notable for the following components:    RBC 4.18 (*)     HGB 12.2 (*) scarring. Stable 1.5 cm in length right upper lobe nodule.      Dictated by (CST): Hudson Matos MD on 11/03/2018 at 15:09     Approved by (CST): Hudson Matos MD on 11/03/2018 at 15:12          Ct Chest Pain/pe (iv Only) Em    Result Date: 11/3/2018  CON significant focal lesion. BILIARY: The gallbladder contains a stone in the neck. No intra- or extrahepatic biliary ductal dilatation. SPLEEN: No enlargement or focal lesion.   STOMACH: There is thickening of the distal esophagus, most compatible with hiat CONCLUSION:  1. Cholelithiasis. No biliary ductal dilatation. 2.  Resorption/resection of the right femoral head neck with antibiotic beads in the right hip joint, which is new since prior exam.  Small amount of fluid also in the joint.   The amount of fl Subcentimeter low-density foci in the kidneys which are too small to characterize but likely represent cysts. 5.  Post IVC filter placement. 6.  Small hiatal hernia.  7.  Nodular soft tissue focus in the left lung base, likely representing round atelectasis

## 2018-11-29 ENCOUNTER — HOSPITAL ENCOUNTER (EMERGENCY)
Facility: HOSPITAL | Age: 53
Discharge: HOME | End: 2018-11-29
Attending: EMERGENCY MEDICINE
Payer: COMMERCIAL

## 2018-11-29 ENCOUNTER — APPOINTMENT (OUTPATIENT)
Dept: ULTRASOUND IMAGING | Facility: HOSPITAL | Age: 53
End: 2018-11-29
Attending: EMERGENCY MEDICINE
Payer: COMMERCIAL

## 2018-11-29 VITALS
DIASTOLIC BLOOD PRESSURE: 73 MMHG | OXYGEN SATURATION: 98 % | RESPIRATION RATE: 14 BRPM | WEIGHT: 192 LBS | HEIGHT: 70 IN | BODY MASS INDEX: 27.49 KG/M2 | TEMPERATURE: 99 F | SYSTOLIC BLOOD PRESSURE: 102 MMHG | HEART RATE: 104 BPM

## 2018-11-29 DIAGNOSIS — I82.411 ACUTE DEEP VEIN THROMBOSIS (DVT) OF FEMORAL VEIN OF RIGHT LOWER EXTREMITY (HCC): Primary | ICD-10-CM

## 2018-11-29 PROCEDURE — 93005 ELECTROCARDIOGRAM TRACING: CPT

## 2018-11-29 PROCEDURE — 93010 ELECTROCARDIOGRAM REPORT: CPT | Performed by: EMERGENCY MEDICINE

## 2018-11-29 PROCEDURE — 80048 BASIC METABOLIC PNL TOTAL CA: CPT | Performed by: EMERGENCY MEDICINE

## 2018-11-29 PROCEDURE — 99285 EMERGENCY DEPT VISIT HI MDM: CPT

## 2018-11-29 PROCEDURE — 36415 COLL VENOUS BLD VENIPUNCTURE: CPT

## 2018-11-29 PROCEDURE — 82550 ASSAY OF CK (CPK): CPT | Performed by: EMERGENCY MEDICINE

## 2018-11-29 PROCEDURE — 85025 COMPLETE CBC W/AUTO DIFF WBC: CPT | Performed by: EMERGENCY MEDICINE

## 2018-11-29 PROCEDURE — 93970 EXTREMITY STUDY: CPT | Performed by: EMERGENCY MEDICINE

## 2018-11-29 PROCEDURE — 85610 PROTHROMBIN TIME: CPT | Performed by: EMERGENCY MEDICINE

## 2018-11-29 RX ORDER — ENOXAPARIN SODIUM 100 MG/ML
1 INJECTION SUBCUTANEOUS 2 TIMES DAILY
Qty: 17.4 ML | Refills: 0 | Status: SHIPPED | OUTPATIENT
Start: 2018-11-29 | End: 2018-12-09

## 2018-11-29 RX ORDER — WARFARIN SODIUM 5 MG/1
5 TABLET ORAL ONCE
Status: COMPLETED | OUTPATIENT
Start: 2018-11-29 | End: 2018-11-29

## 2018-11-29 RX ORDER — HYDROCODONE BITARTRATE AND ACETAMINOPHEN 5; 325 MG/1; MG/1
1 TABLET ORAL ONCE
Status: COMPLETED | OUTPATIENT
Start: 2018-11-29 | End: 2018-11-29

## 2018-11-29 RX ORDER — WARFARIN SODIUM 5 MG/1
5 TABLET ORAL NIGHTLY
Qty: 30 TABLET | Refills: 0 | Status: SHIPPED | OUTPATIENT
Start: 2018-11-29 | End: 2018-12-29

## 2018-11-29 RX ORDER — HYDROCODONE BITARTRATE AND ACETAMINOPHEN 5; 325 MG/1; MG/1
1 TABLET ORAL EVERY 6 HOURS PRN
Qty: 20 TABLET | Refills: 0 | Status: SHIPPED | OUTPATIENT
Start: 2018-11-29 | End: 2018-12-04

## 2018-11-30 NOTE — ED NOTES
Patient given discharge instructions and all questions answered. Patient is dressed and ambulatory with steady gait to exit. Prescription given as well as education on new medicine therapy.  line used and did extensive education and teaching.

## 2018-11-30 NOTE — ED PROVIDER NOTES
Patient Seen in: [facility] AND [facility] Emergency Department    History   Patient presents with:  Swelling Edema (cardiovascular, metabolic)    Stated Complaint: swelling in legs     HPI    45 yo M with PMH HIV, HTN, HL, DVT s/p IVC filter on warfarin presentin Soln,  Inhale 1 puff into the lungs every 6 (six) hours as needed for Wheezing. sucralfate 1 GM/10ML Oral Suspension,  Take 10 mL (1 g total) by mouth 4 (four) times daily before meals and nightly.    clotrimazole-betamethasone 1-0.05 % External Cream,  A 14   Ht 177.8 cm (5' 10\")   Wt 87.1 kg   SpO2 98%   BMI 27.55 kg/m²         Physical Exam   Constitutional: No distress. HEENT: MMM. Head: Normocephalic. Eyes: No injection. Cardiovascular: RRR. BLE with intact pulses.   Pulmonary/Chest: Effort norm DRAW LAVENDER TALL (BNP)     EKG    Rate, intervals and axes as noted on EKG Report.   Rate: 104  Rhythm: Sinus Rhythm  Reading: sinus tach 104 without ischemic changes as interpreted by myself           Us Venous Doppler Leg Bilat - Diag Img (cpt=93970) compromise. Case d/w PCP as noted, will initiate warfarin/enoxaparin - patient tolerant of and familiar with same. PCP graciously to evaluate in ongoing outpatient followup and repeat INR.  Patient without CP/SOB and s/p IVC filter - doubt PE.    2228: PCP

## 2018-11-30 NOTE — ED INITIAL ASSESSMENT (HPI)
Pt states swelling noted to bilateral lower extremities x 2 days. Pt states he had DVT after hip surgery in 2017 and has not been taking his Coumadin for this and states his \"blood is low. \" Has not been taking this medication for 2 weeks- states he broug

## 2018-11-30 NOTE — ED NOTES
Pt states he does not want to ambulate right now because he feels his legs are too swollen. Pt was arely to ambulate into the ED earlier today. md made aware.

## 2019-01-01 ENCOUNTER — HOSPITAL ENCOUNTER (INPATIENT)
Facility: HOSPITAL | Age: 54
LOS: 1 days | Discharge: HOME | DRG: 202 | End: 2019-01-02
Attending: EMERGENCY MEDICINE | Admitting: HOSPITALIST
Payer: COMMERCIAL

## 2019-01-01 ENCOUNTER — APPOINTMENT (OUTPATIENT)
Dept: GENERAL RADIOLOGY | Facility: HOSPITAL | Age: 54
DRG: 202 | End: 2019-01-01
Attending: EMERGENCY MEDICINE
Payer: COMMERCIAL

## 2019-01-01 DIAGNOSIS — E86.0 DEHYDRATION: ICD-10-CM

## 2019-01-01 DIAGNOSIS — J40 BRONCHITIS: Primary | ICD-10-CM

## 2019-01-01 LAB
ALBUMIN SERPL BCP-MCNC: 3.8 G/DL (ref 3.5–4.8)
ALP SERPL-CCNC: 85 U/L (ref 32–100)
ALT SERPL-CCNC: 32 U/L (ref 17–63)
ANION GAP SERPL CALC-SCNC: 15 MMOL/L (ref 0–18)
AST SERPL-CCNC: 55 U/L (ref 15–41)
BASOPHILS # BLD: 0 K/UL (ref 0–0.2)
BASOPHILS NFR BLD: 0 %
BILIRUB DIRECT SERPL-MCNC: 0.4 MG/DL (ref 0–0.2)
BILIRUB SERPL-MCNC: 1.6 MG/DL (ref 0.3–1.2)
BUN SERPL-MCNC: 39 MG/DL (ref 8–20)
BUN/CREAT SERPL: 20.3 (ref 10–20)
CALCIUM SERPL-MCNC: 9.3 MG/DL (ref 8.5–10.5)
CHLORIDE SERPL-SCNC: 99 MMOL/L (ref 95–110)
CO2 SERPL-SCNC: 20 MMOL/L (ref 22–32)
CREAT SERPL-MCNC: 1.92 MG/DL (ref 0.5–1.5)
EOSINOPHIL # BLD: 0 K/UL (ref 0–0.7)
EOSINOPHIL NFR BLD: 0 %
ERYTHROCYTE [DISTWIDTH] IN BLOOD BY AUTOMATED COUNT: 18 % (ref 11–15)
FLUAV + FLUBV RNA SPEC NAA+PROBE: NEGATIVE
GLUCOSE SERPL-MCNC: 99 MG/DL (ref 70–99)
HCT VFR BLD AUTO: 38.5 % (ref 41–52)
HGB BLD-MCNC: 12.3 G/DL (ref 13.5–17.5)
INR BLD: 1.3 (ref 0.9–1.2)
LYMPHOCYTES # BLD: 0.2 K/UL (ref 1–4)
LYMPHOCYTES NFR BLD: 3 %
MCH RBC QN AUTO: 27.5 PG (ref 27–32)
MCHC RBC AUTO-ENTMCNC: 31.9 G/DL (ref 32–37)
MCV RBC AUTO: 86.3 FL (ref 80–100)
MONOCYTES # BLD: 0.5 K/UL (ref 0–1)
MONOCYTES NFR BLD: 8 %
NEUTROPHILS # BLD AUTO: 6 K/UL (ref 1.8–7.7)
NEUTROPHILS NFR BLD: 89 %
OPIATES UR QL SCN: DETECTED
OSMOLALITY UR CALC.SUM OF ELEC: 287 MOSM/KG (ref 275–295)
PLATELET # BLD AUTO: 298 K/UL (ref 140–400)
PMV BLD AUTO: 7.5 FL (ref 7.4–10.3)
POTASSIUM SERPL-SCNC: 5.7 MMOL/L (ref 3.3–5.1)
PROT SERPL-MCNC: 7.7 G/DL (ref 5.9–8.4)
PROTHROMBIN TIME: 15.6 SECONDS (ref 11.8–14.5)
RBC # BLD AUTO: 4.46 M/UL (ref 4.5–5.9)
SODIUM SERPL-SCNC: 134 MMOL/L (ref 136–144)
WBC # BLD AUTO: 6.8 K/UL (ref 4–11)

## 2019-01-01 PROCEDURE — 99223 1ST HOSP IP/OBS HIGH 75: CPT | Performed by: HOSPITALIST

## 2019-01-01 PROCEDURE — 71045 X-RAY EXAM CHEST 1 VIEW: CPT | Performed by: EMERGENCY MEDICINE

## 2019-01-01 RX ORDER — HEPARIN SODIUM 5000 [USP'U]/ML
5000 INJECTION, SOLUTION INTRAVENOUS; SUBCUTANEOUS EVERY 12 HOURS SCHEDULED
Status: DISCONTINUED | OUTPATIENT
Start: 2019-01-01 | End: 2019-01-02

## 2019-01-01 RX ORDER — ZOLPIDEM TARTRATE 5 MG/1
5 TABLET ORAL NIGHTLY PRN
Status: DISCONTINUED | OUTPATIENT
Start: 2019-01-01 | End: 2019-01-02

## 2019-01-01 RX ORDER — SODIUM CHLORIDE 9 MG/ML
INJECTION, SOLUTION INTRAVENOUS CONTINUOUS
Status: DISCONTINUED | OUTPATIENT
Start: 2019-01-01 | End: 2019-01-02

## 2019-01-01 RX ORDER — ABACAVIR 300 MG/1
600 TABLET ORAL DAILY
Status: DISCONTINUED | OUTPATIENT
Start: 2019-01-01 | End: 2019-01-02

## 2019-01-01 RX ORDER — ASPIRIN 81 MG/1
81 TABLET ORAL DAILY
Status: DISCONTINUED | OUTPATIENT
Start: 2019-01-01 | End: 2019-01-02

## 2019-01-01 RX ORDER — ACETAMINOPHEN 325 MG/1
650 TABLET ORAL EVERY 6 HOURS PRN
Status: DISCONTINUED | OUTPATIENT
Start: 2019-01-01 | End: 2019-01-02

## 2019-01-01 RX ORDER — LAMIVUDINE 150 MG/1
300 TABLET, FILM COATED ORAL DAILY
Status: DISCONTINUED | OUTPATIENT
Start: 2019-01-01 | End: 2019-01-02

## 2019-01-01 RX ORDER — ONDANSETRON 2 MG/ML
4 INJECTION INTRAMUSCULAR; INTRAVENOUS EVERY 6 HOURS PRN
Status: DISCONTINUED | OUTPATIENT
Start: 2019-01-01 | End: 2019-01-02

## 2019-01-01 RX ORDER — WARFARIN SODIUM 5 MG/1
5 TABLET ORAL NIGHTLY
Status: DISCONTINUED | OUTPATIENT
Start: 2019-01-01 | End: 2019-01-02

## 2019-01-01 RX ORDER — IPRATROPIUM BROMIDE AND ALBUTEROL SULFATE 2.5; .5 MG/3ML; MG/3ML
3 SOLUTION RESPIRATORY (INHALATION) EVERY 6 HOURS PRN
Status: DISCONTINUED | OUTPATIENT
Start: 2019-01-01 | End: 2019-01-02

## 2019-01-01 RX ORDER — SUCRALFATE ORAL 1 G/10ML
1 SUSPENSION ORAL
Status: DISCONTINUED | OUTPATIENT
Start: 2019-01-01 | End: 2019-01-02

## 2019-01-01 RX ORDER — DIPHENHYDRAMINE HCL 25 MG
25 CAPSULE ORAL EVERY 6 HOURS PRN
Status: DISCONTINUED | OUTPATIENT
Start: 2019-01-01 | End: 2019-01-02

## 2019-01-01 RX ORDER — SODIUM CHLORIDE 0.9 % (FLUSH) 0.9 %
3 SYRINGE (ML) INJECTION AS NEEDED
Status: DISCONTINUED | OUTPATIENT
Start: 2019-01-01 | End: 2019-01-02

## 2019-01-01 RX ORDER — ATAZANAVIR 300 MG/1
300 CAPSULE ORAL
Status: DISCONTINUED | OUTPATIENT
Start: 2019-01-02 | End: 2019-01-02

## 2019-01-01 RX ORDER — EMTRICITABINE AND TENOFOVIR DISOPROXIL FUMARATE 200; 300 MG/1; MG/1
1 TABLET, FILM COATED ORAL DAILY
Status: DISCONTINUED | OUTPATIENT
Start: 2019-01-01 | End: 2019-01-01

## 2019-01-01 RX ORDER — ALBUTEROL SULFATE 90 UG/1
1 AEROSOL, METERED RESPIRATORY (INHALATION) EVERY 6 HOURS PRN
Status: DISCONTINUED | OUTPATIENT
Start: 2019-01-01 | End: 2019-01-02

## 2019-01-01 RX ORDER — GUAIFENESIN 100 MG/5ML
200 SOLUTION ORAL EVERY 4 HOURS PRN
Status: DISCONTINUED | OUTPATIENT
Start: 2019-01-01 | End: 2019-01-02

## 2019-01-01 NOTE — ED INITIAL ASSESSMENT (HPI)
Pt found sitting on a bench outside, ems called by bystander. Pt reports coughing up sputum. +chills.

## 2019-01-01 NOTE — ED PROVIDER NOTES
Patient Seen in: [facility] AND [facility] Emergency Department    History   Patient presents with:  Cough    Stated Complaint: coughing up sputum/chills    HPI    Patient is a 70-year-old male who speaks Taiwanese history is obtained with the help of staff transl Comment: stopped a year and a half ago      Review of Systems    Positive for stated complaint: coughing up sputum/chills  Other systems are as noted in HPI. Constitutional and vital signs reviewed.       All other systems reviewed and negative except as (*)     Potassium 5.7 (*)     CO2 20 (*)     BUN 39 (*)     Creatinine 1.92 (*)     BUN/CREA Ratio 20.3 (*)     GFR, Non- 39 (*)     GFR, -American 45 (*)     All other components within normal limits   HEPATIC FUNCTION PANEL (7) - A encounter diagnosis)  Dehydration    Disposition:  Admit  1/1/2019  2:35 pm    Follow-up:  No follow-up provider specified.       Medications Prescribed:  Current Discharge Medication List        Present on Admission  Date Reviewed: 2/21/2017          ICD-1

## 2019-01-01 NOTE — ED NOTES
Orders for admission, patient is aware of plan and ready to go upstairs.  Any questions, please call ED RN La  at extension [phone]

## 2019-01-01 NOTE — ED NOTES
Pt involved in a verbal argument with wife earlier this morning, states he has been sitting outside since around 0600 today.

## 2019-01-01 NOTE — H&P
[facility]    PATIENT'S NAME: JUAN Chavira   ATTENDING PHYSICIAN: Jose Carlos Scott MD   PATIENT ACCOUNT#:   [id number]    LOCATION:  [place] [address]  MEDICAL RECORD #:   [id number]       YOB: 1965  ADMISSION DATE:       01/0 he cannot remember. ALLERGIES:  No known drug allergies. FAMILY HISTORY:  Positive for diabetes, hypertension, and heart disease. SOCIAL HISTORY:  History of AIDS, IV heroin abuse. He takes methadone per history.   Not clear if patient drinks alco Chaka Xiong MD  d: 01/01/2019 14:35:53  t: 01/01/2019 14:39:50  [facility] [id number]  FB/

## 2019-01-01 NOTE — ED NOTES
Patient provided water, unable to urinate at this time. Alert and oriented watching tv at this time.

## 2019-01-02 VITALS
BODY MASS INDEX: 27.49 KG/M2 | RESPIRATION RATE: 18 BRPM | HEIGHT: 70 IN | WEIGHT: 192 LBS | OXYGEN SATURATION: 93 % | DIASTOLIC BLOOD PRESSURE: 52 MMHG | SYSTOLIC BLOOD PRESSURE: 111 MMHG | TEMPERATURE: 97 F | HEART RATE: 104 BPM

## 2019-01-02 LAB
ANION GAP SERPL CALC-SCNC: 7 MMOL/L (ref 0–18)
BASOPHILS # BLD: 0 K/UL (ref 0–0.2)
BASOPHILS NFR BLD: 1 %
BUN SERPL-MCNC: 31 MG/DL (ref 8–20)
BUN/CREAT SERPL: 26.7 (ref 10–20)
CALCIUM SERPL-MCNC: 7.9 MG/DL (ref 8.5–10.5)
CHLORIDE SERPL-SCNC: 106 MMOL/L (ref 95–110)
CO2 SERPL-SCNC: 23 MMOL/L (ref 22–32)
CREAT SERPL-MCNC: 1.16 MG/DL (ref 0.5–1.5)
EOSINOPHIL # BLD: 0.1 K/UL (ref 0–0.7)
EOSINOPHIL NFR BLD: 2 %
ERYTHROCYTE [DISTWIDTH] IN BLOOD BY AUTOMATED COUNT: 17.9 % (ref 11–15)
GLUCOSE SERPL-MCNC: 100 MG/DL (ref 70–99)
HCT VFR BLD AUTO: 31 % (ref 41–52)
HGB BLD-MCNC: 10.1 G/DL (ref 13.5–17.5)
INR BLD: 1.4 (ref 0.9–1.2)
LYMPHOCYTES # BLD: 0.3 K/UL (ref 1–4)
LYMPHOCYTES NFR BLD: 8 %
MCH RBC QN AUTO: 27.5 PG (ref 27–32)
MCHC RBC AUTO-ENTMCNC: 32.6 G/DL (ref 32–37)
MCV RBC AUTO: 84.2 FL (ref 80–100)
MONOCYTES # BLD: 0.5 K/UL (ref 0–1)
MONOCYTES NFR BLD: 14 %
NEUTROPHILS # BLD AUTO: 2.4 K/UL (ref 1.8–7.7)
NEUTROPHILS NFR BLD: 75 %
OSMOLALITY UR CALC.SUM OF ELEC: 289 MOSM/KG (ref 275–295)
PLATELET # BLD AUTO: 242 K/UL (ref 140–400)
PMV BLD AUTO: 6.4 FL (ref 7.4–10.3)
POTASSIUM SERPL-SCNC: 3.7 MMOL/L (ref 3.3–5.1)
PROTHROMBIN TIME: 16.3 SECONDS (ref 11.8–14.5)
RBC # BLD AUTO: 3.69 M/UL (ref 4.5–5.9)
SODIUM SERPL-SCNC: 136 MMOL/L (ref 136–144)
WBC # BLD AUTO: 3.2 K/UL (ref 4–11)

## 2019-01-02 PROCEDURE — 99239 HOSP IP/OBS DSCHRG MGMT >30: CPT | Performed by: HOSPITALIST

## 2019-01-02 RX ORDER — POTASSIUM CHLORIDE 20 MEQ/1
40 TABLET, EXTENDED RELEASE ORAL ONCE
Status: COMPLETED | OUTPATIENT
Start: 2019-01-02 | End: 2019-01-02

## 2019-01-02 RX ORDER — AZITHROMYCIN 250 MG/1
TABLET, FILM COATED ORAL
Qty: 6 TABLET | Refills: 0 | Status: SHIPPED | OUTPATIENT
Start: 2019-01-02 | End: 2019-01-07

## 2019-01-02 NOTE — PLAN OF CARE
Problem: Patient Centered Care  Goal: Patient preferences are identified and integrated in the patient's plan of care  Interventions:  - What would you like us to know as we care for you?  Pt Amharic speaking   - Provide timely, complete, and accurate infor

## 2019-01-02 NOTE — PLAN OF CARE
Problem: Patient Centered Care  Goal: Patient preferences are identified and integrated in the patient's plan of care  Interventions:  - What would you like us to know as we care for you?  Pt Polish speaking   - Provide timely, complete, and accurate infor

## 2019-01-02 NOTE — DISCHARGE SUMMARY
[facility] HOSPITALIST  DISCHARGE SUMMARY     Luis Angel Mccracken Patient Status:  Inpatient    3/31/1965 MRN [id number]   Location [address] [facility] Attending Prisca Murray MD   Hosp Day # 1 PCP Jessica Santillan     DATE OF ADMISSION: 2019  DATE OF [facility] than the ones we were giving him here and refused to take them. His INR was subtherapeutic. We have asked him to have an INR checked in 2 days, resume his HIV meds as prescribed by his infectious disease doctor.   I have asked him to call his infectious d as: LOTRISONE      Apply 1 Application topically 2 (two) times daily.    Quantity:  45 g  Refills:  2     DiphenhydrAMINE HCl 25 MG Caps  Commonly known as:  BENADRYL      Take 1 capsule (25 mg total) by mouth every 6 (six) hours as needed for Itching or S above plan and follow-up instructions were reviewed with the patient and they verbalized understanding and agreement. They understand to return to the emergency room for any concerning signs or symptoms.   Greater than 30 minutes spent on discharge.  -----

## 2019-01-02 NOTE — CM/SW NOTE
RONAK received order for Advanced directives. Pt has been screened per chart review and during nursing rounds. Pt is from home w/ girlfriend and has a hx w/ [facility]. RONAK provided pt w/ HCPOA forms [place] [facility] [place] and informed RN.  Pt can contact RONAK w/ any furt

## 2019-01-02 NOTE — PLAN OF CARE
Problem: Patient Centered Care  Goal: Patient preferences are identified and integrated in the patient's plan of care  Interventions:  - What would you like us to know as we care for you?  Pt Kyrgyz speaking   - Provide timely, complete, and accurate infor

## 2019-01-03 NOTE — PAYOR COMM NOTE
--------------  DISCHARGE REVIEW    Payor: Lorraine Jacinto  #:  [id number]  Authorization Number: [id number]    Admit date: 1/1/19  Admit time:  3382  Discharge Date: 1/2/2019  3:22 PM     Admitting Physician: Lisa Cottrell MD  Attending Ph was warmed up in the emergency room. HOSPITAL COURSE:  Patient's acute renal failure most certainly due to dehydration. Creatinine improved to baseline with IV fluids. Patient may have a mild bronchitis, no pneumonia seen on chest imaging.   No fevers Instructions Prescription details   azithromycin 250 MG Tabs  Commonly known as:  Erica Dunbar  Start taking on:  1/2/2019      Take 2 tablets (500 mg total) by mouth daily for 1 day, THEN 1 tablet (250 mg total) daily for 4 days.    Stop taking on:  1/7/2019 Take 180 mg by mouth daily. Refills:  0     Warfarin Sodium 5 MG Tabs  Commonly known as:  COUMADIN      Take 1 tablet (5 mg total) by mouth nightly.    Quantity:  50 tablet  Refills:  0     Zolpidem Tartrate 5 MG Tabs  Commonly known as:  Cindy Mccray

## 2019-05-13 ENCOUNTER — APPOINTMENT (OUTPATIENT)
Dept: GENERAL RADIOLOGY | Facility: HOSPITAL | Age: 54
End: 2019-05-13
Attending: EMERGENCY MEDICINE
Payer: COMMERCIAL

## 2019-05-13 ENCOUNTER — HOSPITAL ENCOUNTER (EMERGENCY)
Facility: HOSPITAL | Age: 54
Discharge: HOME | End: 2019-05-13
Attending: EMERGENCY MEDICINE
Payer: COMMERCIAL

## 2019-05-13 VITALS
SYSTOLIC BLOOD PRESSURE: 116 MMHG | OXYGEN SATURATION: 91 % | HEIGHT: 70 IN | TEMPERATURE: 99 F | WEIGHT: 190 LBS | BODY MASS INDEX: 27.2 KG/M2 | RESPIRATION RATE: 10 BRPM | HEART RATE: 80 BPM | DIASTOLIC BLOOD PRESSURE: 82 MMHG

## 2019-05-13 DIAGNOSIS — J45.901 EXACERBATION OF ASTHMA, UNSPECIFIED ASTHMA SEVERITY, UNSPECIFIED WHETHER PERSISTENT: Primary | ICD-10-CM

## 2019-05-13 PROCEDURE — 71046 X-RAY EXAM CHEST 2 VIEWS: CPT | Performed by: EMERGENCY MEDICINE

## 2019-05-13 PROCEDURE — 94640 AIRWAY INHALATION TREATMENT: CPT

## 2019-05-13 PROCEDURE — 80048 BASIC METABOLIC PNL TOTAL CA: CPT | Performed by: EMERGENCY MEDICINE

## 2019-05-13 PROCEDURE — 96374 THER/PROPH/DIAG INJ IV PUSH: CPT

## 2019-05-13 PROCEDURE — 93010 ELECTROCARDIOGRAM REPORT: CPT | Performed by: EMERGENCY MEDICINE

## 2019-05-13 PROCEDURE — 85025 COMPLETE CBC W/AUTO DIFF WBC: CPT | Performed by: EMERGENCY MEDICINE

## 2019-05-13 PROCEDURE — 99284 EMERGENCY DEPT VISIT MOD MDM: CPT

## 2019-05-13 PROCEDURE — 93005 ELECTROCARDIOGRAM TRACING: CPT

## 2019-05-13 RX ORDER — ALBUTEROL SULFATE 90 UG/1
2 AEROSOL, METERED RESPIRATORY (INHALATION) EVERY 4 HOURS PRN
Qty: 1 INHALER | Refills: 0 | Status: SHIPPED | OUTPATIENT
Start: 2019-05-13 | End: 2019-06-12

## 2019-05-13 RX ORDER — IPRATROPIUM BROMIDE AND ALBUTEROL SULFATE 2.5; .5 MG/3ML; MG/3ML
3 SOLUTION RESPIRATORY (INHALATION) ONCE
Status: COMPLETED | OUTPATIENT
Start: 2019-05-13 | End: 2019-05-13

## 2019-05-13 RX ORDER — PREDNISONE 20 MG/1
40 TABLET ORAL DAILY
Qty: 10 TABLET | Refills: 0 | Status: SHIPPED | OUTPATIENT
Start: 2019-05-13 | End: 2019-05-18

## 2019-05-13 RX ORDER — METHYLPREDNISOLONE SODIUM SUCCINATE 125 MG/2ML
125 INJECTION, POWDER, LYOPHILIZED, FOR SOLUTION INTRAMUSCULAR; INTRAVENOUS ONCE
Status: COMPLETED | OUTPATIENT
Start: 2019-05-13 | End: 2019-05-13

## 2019-05-13 NOTE — ED INITIAL ASSESSMENT (HPI)
Pt came in for cough with SOB and CP for 3 days. Reports hx of asthma. Also hx of HIV. Denies fever, N/V/D. RR even and shallow at rest, speaking in full sentences.

## 2019-05-13 NOTE — ED PROVIDER NOTES
Patient Seen in: [facility] AND [facility] Emergency Department    History   Patient presents with:  Dyspnea CAMILLE SOB (respiratory)    Stated Complaint:     HPI    78-year-old male with past medical history significant for asthma, DVT, high cholesterol, HIV, IV d noted in HPI. Constitutional and vital signs reviewed. All other systems reviewed and negative except as noted above.     Physical Exam     ED Triage Vitals [05/13/19 1739]   /72   Pulse 93   Resp 20   Temp 98.9 °F (37.2 °C)   Temp src Oral   Sp Status                     ---------                               -----------         ------                     CBC W/ DIFFERENTIAL[[id number]]          Abnormal            Final result                 Please view results for these tests on the individual Wheezing. Qty: 1 Inhaler Refills: 0    !! - Potential duplicate medications found. Please discuss with provider.

## 2019-05-14 NOTE — ED NOTES
Assumed care. Pt resting on ED cart. States he is breathing better. No respiratory distress noted. Denies needs at this time.

## 2019-07-06 ENCOUNTER — APPOINTMENT (OUTPATIENT)
Dept: CT IMAGING | Facility: HOSPITAL | Age: 54
End: 2019-07-06
Attending: EMERGENCY MEDICINE
Payer: COMMERCIAL

## 2019-07-06 ENCOUNTER — HOSPITAL ENCOUNTER (EMERGENCY)
Facility: HOSPITAL | Age: 54
Discharge: HOME | End: 2019-07-06
Attending: EMERGENCY MEDICINE
Payer: COMMERCIAL

## 2019-07-06 VITALS
HEART RATE: 73 BPM | SYSTOLIC BLOOD PRESSURE: 110 MMHG | RESPIRATION RATE: 18 BRPM | TEMPERATURE: 99 F | DIASTOLIC BLOOD PRESSURE: 72 MMHG | OXYGEN SATURATION: 95 %

## 2019-07-06 DIAGNOSIS — S00.03XA HEMATOMA OF SCALP, INITIAL ENCOUNTER: Primary | ICD-10-CM

## 2019-07-06 DIAGNOSIS — J01.40 ACUTE PANSINUSITIS, RECURRENCE NOT SPECIFIED: ICD-10-CM

## 2019-07-06 LAB
ANION GAP SERPL CALC-SCNC: 4 MMOL/L (ref 0–18)
APTT PPP: 29.2 SECONDS (ref 23.2–35.3)
BASOPHILS # BLD AUTO: 0.03 X10(3) UL (ref 0–0.2)
BASOPHILS NFR BLD AUTO: 0.8 %
BILIRUB UR QL: NEGATIVE
BUN BLD-MCNC: 21 MG/DL (ref 7–18)
BUN/CREAT SERPL: 19.4 (ref 10–20)
CALCIUM BLD-MCNC: 8.6 MG/DL (ref 8.5–10.1)
CHLORIDE SERPL-SCNC: 107 MMOL/L (ref 98–112)
CLARITY UR: CLEAR
CO2 SERPL-SCNC: 27 MMOL/L (ref 21–32)
COLOR UR: YELLOW
CREAT BLD-MCNC: 1.08 MG/DL (ref 0.7–1.3)
DEPRECATED RDW RBC AUTO: 51.2 FL (ref 35.1–46.3)
EOSINOPHIL # BLD AUTO: 0.2 X10(3) UL (ref 0–0.7)
EOSINOPHIL NFR BLD AUTO: 5.3 %
ERYTHROCYTE [DISTWIDTH] IN BLOOD BY AUTOMATED COUNT: 15.6 % (ref 11–15)
GLUCOSE BLD-MCNC: 86 MG/DL (ref 70–99)
GLUCOSE UR-MCNC: NEGATIVE MG/DL
HCT VFR BLD AUTO: 39.7 % (ref 39–53)
HGB BLD-MCNC: 12.8 G/DL (ref 13–17.5)
HGB UR QL STRIP.AUTO: NEGATIVE
IMM GRANULOCYTES # BLD AUTO: 0.02 X10(3) UL (ref 0–1)
IMM GRANULOCYTES NFR BLD: 0.5 %
INR BLD: 1.09 (ref 0.9–1.2)
KETONES UR-MCNC: NEGATIVE MG/DL
LEUKOCYTE ESTERASE UR QL STRIP.AUTO: NEGATIVE
LYMPHOCYTES # BLD AUTO: 1.21 X10(3) UL (ref 1–4)
LYMPHOCYTES NFR BLD AUTO: 31.8 %
MCH RBC QN AUTO: 29 PG (ref 26–34)
MCHC RBC AUTO-ENTMCNC: 32.2 G/DL (ref 31–37)
MCV RBC AUTO: 90 FL (ref 80–100)
MONOCYTES # BLD AUTO: 0.47 X10(3) UL (ref 0.1–1)
MONOCYTES NFR BLD AUTO: 12.4 %
NEUTROPHILS # BLD AUTO: 1.87 X10 (3) UL (ref 1.5–7.7)
NEUTROPHILS # BLD AUTO: 1.87 X10(3) UL (ref 1.5–7.7)
NEUTROPHILS NFR BLD AUTO: 49.2 %
NITRITE UR QL STRIP.AUTO: NEGATIVE
OSMOLALITY SERPL CALC.SUM OF ELEC: 288 MOSM/KG (ref 275–295)
PH UR: 5 [PH] (ref 5–8)
PLATELET # BLD AUTO: 189 10(3)UL (ref 150–450)
POTASSIUM SERPL-SCNC: 4.8 MMOL/L (ref 3.5–5.1)
PROT UR-MCNC: NEGATIVE MG/DL
PROTHROMBIN TIME: 13.9 SECONDS (ref 11.8–14.5)
RBC # BLD AUTO: 4.41 X10(6)UL (ref 4.3–5.7)
SODIUM SERPL-SCNC: 138 MMOL/L (ref 136–145)
SP GR UR STRIP: 1.01 (ref 1–1.03)
UROBILINOGEN UR STRIP-ACNC: <2
VIT C UR-MCNC: NEGATIVE MG/DL
WBC # BLD AUTO: 3.8 X10(3) UL (ref 4–11)

## 2019-07-06 PROCEDURE — 85730 THROMBOPLASTIN TIME PARTIAL: CPT | Performed by: EMERGENCY MEDICINE

## 2019-07-06 PROCEDURE — 96374 THER/PROPH/DIAG INJ IV PUSH: CPT

## 2019-07-06 PROCEDURE — 99284 EMERGENCY DEPT VISIT MOD MDM: CPT

## 2019-07-06 PROCEDURE — 81003 URINALYSIS AUTO W/O SCOPE: CPT | Performed by: EMERGENCY MEDICINE

## 2019-07-06 PROCEDURE — 80048 BASIC METABOLIC PNL TOTAL CA: CPT | Performed by: EMERGENCY MEDICINE

## 2019-07-06 PROCEDURE — 85610 PROTHROMBIN TIME: CPT | Performed by: EMERGENCY MEDICINE

## 2019-07-06 PROCEDURE — 85025 COMPLETE CBC W/AUTO DIFF WBC: CPT | Performed by: EMERGENCY MEDICINE

## 2019-07-06 PROCEDURE — 70450 CT HEAD/BRAIN W/O DYE: CPT | Performed by: EMERGENCY MEDICINE

## 2019-07-06 PROCEDURE — 96375 TX/PRO/DX INJ NEW DRUG ADDON: CPT

## 2019-07-06 RX ORDER — DIPHENHYDRAMINE HYDROCHLORIDE 50 MG/ML
25 INJECTION INTRAMUSCULAR; INTRAVENOUS ONCE
Status: COMPLETED | OUTPATIENT
Start: 2019-07-06 | End: 2019-07-06

## 2019-07-06 RX ORDER — TRAMADOL HYDROCHLORIDE 50 MG/1
50 TABLET ORAL EVERY 6 HOURS PRN
Qty: 10 TABLET | Refills: 0 | Status: SHIPPED | OUTPATIENT
Start: 2019-07-06 | End: 2019-07-13

## 2019-07-06 RX ORDER — AMOXICILLIN AND CLAVULANATE POTASSIUM 875; 125 MG/1; MG/1
1 TABLET, FILM COATED ORAL 2 TIMES DAILY
Qty: 20 TABLET | Refills: 0 | Status: SHIPPED | OUTPATIENT
Start: 2019-07-06 | End: 2019-07-16

## 2019-07-06 RX ORDER — METOCLOPRAMIDE HYDROCHLORIDE 5 MG/ML
5 INJECTION INTRAMUSCULAR; INTRAVENOUS ONCE
Status: COMPLETED | OUTPATIENT
Start: 2019-07-06 | End: 2019-07-06

## 2019-07-07 NOTE — ED PROVIDER NOTES
Patient Seen in: [facility] Emergency Department    History   Patient presents with:  Headache (neurologic)    Stated Complaint: HA    HPI  History is obtained with the help of a   Patient presents the emergency department with a right p complaint: HA  Other systems are as noted in HPI. Constitutional and vital signs reviewed. All other systems reviewed and negative except as noted above.     Physical Exam     ED Triage Vitals [07/06/19 1722]   /73   Pulse 103   Resp 20   Temp 9 the following components:    WBC 3.8 (*)     HGB 12.8 (*)     RDW-SD 51.2 (*)     RDW 15.6 (*)     All other components within normal limits   PROTHROMBIN TIME (PT) - Normal   PTT, ACTIVATED - Normal   CBC WITH DIFFERENTIAL WITH PLATELET    Narrative: is to return for worsening condition and follow-up with his primary care physician. Pt also has a subtherapeutic INR and states that he is supposed to be on Coumadin due to h/o DVT/PE.  Recommended resuming his Coumadin therapy and following up with his PMD

## 2019-09-08 ENCOUNTER — HOSPITAL ENCOUNTER (EMERGENCY)
Facility: HOSPITAL | Age: 54
Discharge: HOME | End: 2019-09-09
Attending: EMERGENCY MEDICINE
Payer: COMMERCIAL

## 2019-09-08 ENCOUNTER — APPOINTMENT (OUTPATIENT)
Dept: GENERAL RADIOLOGY | Facility: HOSPITAL | Age: 54
End: 2019-09-08
Attending: EMERGENCY MEDICINE
Payer: COMMERCIAL

## 2019-09-08 DIAGNOSIS — J45.901 ASTHMA EXACERBATION, MILD: Primary | ICD-10-CM

## 2019-09-08 LAB
ANION GAP SERPL CALC-SCNC: 5 MMOL/L (ref 0–18)
BASOPHILS # BLD AUTO: 0.02 X10(3) UL (ref 0–0.2)
BASOPHILS NFR BLD AUTO: 0.4 %
BUN BLD-MCNC: 29 MG/DL (ref 7–18)
BUN/CREAT SERPL: 19.3 (ref 10–20)
CALCIUM BLD-MCNC: 8.3 MG/DL (ref 8.5–10.1)
CHLORIDE SERPL-SCNC: 106 MMOL/L (ref 98–112)
CO2 SERPL-SCNC: 28 MMOL/L (ref 21–32)
CREAT BLD-MCNC: 1.5 MG/DL (ref 0.7–1.3)
DEPRECATED RDW RBC AUTO: 46.3 FL (ref 35.1–46.3)
EOSINOPHIL # BLD AUTO: 0.16 X10(3) UL (ref 0–0.7)
EOSINOPHIL NFR BLD AUTO: 3.3 %
ERYTHROCYTE [DISTWIDTH] IN BLOOD BY AUTOMATED COUNT: 14.1 % (ref 11–15)
GLUCOSE BLD-MCNC: 92 MG/DL (ref 70–99)
HCT VFR BLD AUTO: 35 % (ref 39–53)
HGB BLD-MCNC: 11.3 G/DL (ref 13–17.5)
IMM GRANULOCYTES # BLD AUTO: 0.02 X10(3) UL (ref 0–1)
IMM GRANULOCYTES NFR BLD: 0.4 %
LYMPHOCYTES # BLD AUTO: 0.91 X10(3) UL (ref 1–4)
LYMPHOCYTES NFR BLD AUTO: 18.7 %
MCH RBC QN AUTO: 29.1 PG (ref 26–34)
MCHC RBC AUTO-ENTMCNC: 32.3 G/DL (ref 31–37)
MCV RBC AUTO: 90.2 FL (ref 80–100)
MONOCYTES # BLD AUTO: 0.64 X10(3) UL (ref 0.1–1)
MONOCYTES NFR BLD AUTO: 13.2 %
NEUTROPHILS # BLD AUTO: 3.11 X10 (3) UL (ref 1.5–7.7)
NEUTROPHILS # BLD AUTO: 3.11 X10(3) UL (ref 1.5–7.7)
NEUTROPHILS NFR BLD AUTO: 64 %
OSMOLALITY SERPL CALC.SUM OF ELEC: 293 MOSM/KG (ref 275–295)
PLATELET # BLD AUTO: 216 10(3)UL (ref 150–450)
POTASSIUM SERPL-SCNC: 4.5 MMOL/L (ref 3.5–5.1)
RBC # BLD AUTO: 3.88 X10(6)UL (ref 4.3–5.7)
SODIUM SERPL-SCNC: 139 MMOL/L (ref 136–145)
WBC # BLD AUTO: 4.9 X10(3) UL (ref 4–11)

## 2019-09-08 PROCEDURE — 94640 AIRWAY INHALATION TREATMENT: CPT

## 2019-09-08 PROCEDURE — 80048 BASIC METABOLIC PNL TOTAL CA: CPT | Performed by: EMERGENCY MEDICINE

## 2019-09-08 PROCEDURE — 36415 COLL VENOUS BLD VENIPUNCTURE: CPT

## 2019-09-08 PROCEDURE — 85025 COMPLETE CBC W/AUTO DIFF WBC: CPT | Performed by: EMERGENCY MEDICINE

## 2019-09-08 PROCEDURE — 99284 EMERGENCY DEPT VISIT MOD MDM: CPT

## 2019-09-08 PROCEDURE — 71045 X-RAY EXAM CHEST 1 VIEW: CPT | Performed by: EMERGENCY MEDICINE

## 2019-09-08 RX ORDER — IPRATROPIUM BROMIDE AND ALBUTEROL SULFATE 2.5; .5 MG/3ML; MG/3ML
3 SOLUTION RESPIRATORY (INHALATION) ONCE
Status: COMPLETED | OUTPATIENT
Start: 2019-09-08 | End: 2019-09-08

## 2019-09-09 VITALS
HEIGHT: 70 IN | RESPIRATION RATE: 20 BRPM | HEART RATE: 89 BPM | WEIGHT: 184 LBS | BODY MASS INDEX: 26.34 KG/M2 | OXYGEN SATURATION: 95 % | TEMPERATURE: 98 F | SYSTOLIC BLOOD PRESSURE: 103 MMHG | DIASTOLIC BLOOD PRESSURE: 66 MMHG

## 2019-09-09 RX ORDER — AZITHROMYCIN 250 MG/1
TABLET, FILM COATED ORAL
Qty: 1 PACKAGE | Refills: 0 | Status: SHIPPED | OUTPATIENT
Start: 2019-09-09 | End: 2019-09-14

## 2019-09-09 RX ORDER — CEFDINIR 300 MG/1
300 CAPSULE ORAL 2 TIMES DAILY
Qty: 10 CAPSULE | Refills: 0 | Status: SHIPPED | OUTPATIENT
Start: 2019-09-09 | End: 2019-09-14

## 2019-09-09 RX ORDER — ALBUTEROL SULFATE 90 UG/1
2 AEROSOL, METERED RESPIRATORY (INHALATION) EVERY 4 HOURS PRN
Qty: 1 INHALER | Refills: 0 | Status: SHIPPED | OUTPATIENT
Start: 2019-09-09 | End: 2019-10-09

## 2019-09-09 NOTE — ED INITIAL ASSESSMENT (HPI)
Asthma attack x4 days ago. THick phlem with spanteous cough. Using inhaler at home, just pta. Speaking in short sentences.  used for assessment.

## 2019-09-09 NOTE — ED PROVIDER NOTES
Patient Seen in: [facility] AND [facility] Emergency Department    History   Patient presents with:  Dyspnea CAMILLE SOB (respiratory)    Stated Complaint: asthma    HPI    50-year-old male with asthma, DVT on Coumadin, HIV, high cholesterol, presenting for evaluati ED Triage Vitals [09/08/19 2201]   /66   Pulse 112   Resp 24   Temp 97.7 °F (36.5 °C)   Temp src Oral   SpO2 95 %   O2 Device None (Room air)       Current:/66   Pulse 89   Temp 97.7 °F (36.5 °C) (Oral)   Resp 20   Ht 177.8 cm (5' 10\")   W RAINBOW DRAW GOLD              [facility]   50-year-old male presenting for evaluation of difficulty breathing. Overall well-appearing, hemodynamically stable. Clinically symptoms are most consistent with asthma exacerbation.   Patient was given hour-long nebu

## 2019-09-09 NOTE — ED NOTES
Pt to ER with c/o dyspnea and cough. Pt with hx of asthma. Pt speak primarily Norwegian. Pt c/o chest discomfort with cough. Pt states \"chills\" without noted fever. +exp wheezing noted to bilateral lungs. Pt able to speak in small short sentences.  Pt is 89

## 2019-09-09 NOTE — ED NOTES
Reviewed discharge information with patient using language line,  Yoana Tejada ID# [id number]. Patient verbalized understanding, no further questions or complaints at this time.  Patient is alert and orientated x4, in no apparent distress, ambulating with s

## 2019-10-23 ENCOUNTER — HOSPITAL ENCOUNTER (EMERGENCY)
Facility: HOSPITAL | Age: 54
Discharge: HOME | End: 2019-10-23
Attending: EMERGENCY MEDICINE
Payer: COMMERCIAL

## 2019-10-23 ENCOUNTER — APPOINTMENT (OUTPATIENT)
Dept: CT IMAGING | Facility: HOSPITAL | Age: 54
End: 2019-10-23
Attending: EMERGENCY MEDICINE
Payer: COMMERCIAL

## 2019-10-23 ENCOUNTER — APPOINTMENT (OUTPATIENT)
Dept: ULTRASOUND IMAGING | Facility: HOSPITAL | Age: 54
End: 2019-10-23
Attending: EMERGENCY MEDICINE
Payer: COMMERCIAL

## 2019-10-23 ENCOUNTER — APPOINTMENT (OUTPATIENT)
Dept: GENERAL RADIOLOGY | Facility: HOSPITAL | Age: 54
End: 2019-10-23
Attending: EMERGENCY MEDICINE
Payer: COMMERCIAL

## 2019-10-23 VITALS
SYSTOLIC BLOOD PRESSURE: 131 MMHG | BODY MASS INDEX: 27.28 KG/M2 | HEART RATE: 96 BPM | WEIGHT: 180 LBS | OXYGEN SATURATION: 95 % | DIASTOLIC BLOOD PRESSURE: 72 MMHG | HEIGHT: 68 IN | RESPIRATION RATE: 12 BRPM | TEMPERATURE: 99 F

## 2019-10-23 DIAGNOSIS — J69.0 ASPIRATION PNEUMONITIS (HCC): Primary | ICD-10-CM

## 2019-10-23 DIAGNOSIS — J44.1 ACUTE EXACERBATION OF CHRONIC OBSTRUCTIVE PULMONARY DISEASE (COPD) (HCC): ICD-10-CM

## 2019-10-23 PROCEDURE — 85730 THROMBOPLASTIN TIME PARTIAL: CPT | Performed by: EMERGENCY MEDICINE

## 2019-10-23 PROCEDURE — 99285 EMERGENCY DEPT VISIT HI MDM: CPT

## 2019-10-23 PROCEDURE — 80048 BASIC METABOLIC PNL TOTAL CA: CPT | Performed by: EMERGENCY MEDICINE

## 2019-10-23 PROCEDURE — 85025 COMPLETE CBC W/AUTO DIFF WBC: CPT | Performed by: EMERGENCY MEDICINE

## 2019-10-23 PROCEDURE — 83880 ASSAY OF NATRIURETIC PEPTIDE: CPT | Performed by: EMERGENCY MEDICINE

## 2019-10-23 PROCEDURE — 94640 AIRWAY INHALATION TREATMENT: CPT

## 2019-10-23 PROCEDURE — 71260 CT THORAX DX C+: CPT | Performed by: EMERGENCY MEDICINE

## 2019-10-23 PROCEDURE — 71045 X-RAY EXAM CHEST 1 VIEW: CPT | Performed by: EMERGENCY MEDICINE

## 2019-10-23 PROCEDURE — 93971 EXTREMITY STUDY: CPT | Performed by: EMERGENCY MEDICINE

## 2019-10-23 PROCEDURE — 85610 PROTHROMBIN TIME: CPT | Performed by: EMERGENCY MEDICINE

## 2019-10-23 PROCEDURE — 96374 THER/PROPH/DIAG INJ IV PUSH: CPT

## 2019-10-23 PROCEDURE — 96375 TX/PRO/DX INJ NEW DRUG ADDON: CPT

## 2019-10-23 RX ORDER — IPRATROPIUM BROMIDE AND ALBUTEROL SULFATE 2.5; .5 MG/3ML; MG/3ML
3 SOLUTION RESPIRATORY (INHALATION) ONCE
Status: COMPLETED | OUTPATIENT
Start: 2019-10-23 | End: 2019-10-23

## 2019-10-23 RX ORDER — PREDNISONE 20 MG/1
40 TABLET ORAL DAILY
Qty: 10 TABLET | Refills: 0 | Status: SHIPPED | OUTPATIENT
Start: 2019-10-23 | End: 2019-10-28

## 2019-10-23 RX ORDER — AMOXICILLIN AND CLAVULANATE POTASSIUM 875; 125 MG/1; MG/1
1 TABLET, FILM COATED ORAL 2 TIMES DAILY
Qty: 20 TABLET | Refills: 0 | Status: SHIPPED | OUTPATIENT
Start: 2019-10-23 | End: 2019-11-02

## 2019-10-23 RX ORDER — AMOXICILLIN AND CLAVULANATE POTASSIUM 875; 125 MG/1; MG/1
1 TABLET, FILM COATED ORAL 2 TIMES DAILY
Qty: 20 TABLET | Refills: 0 | Status: SHIPPED | OUTPATIENT
Start: 2019-10-23 | End: 2019-10-23

## 2019-10-23 RX ORDER — METHYLPREDNISOLONE SODIUM SUCCINATE 125 MG/2ML
125 INJECTION, POWDER, LYOPHILIZED, FOR SOLUTION INTRAMUSCULAR; INTRAVENOUS ONCE
Status: COMPLETED | OUTPATIENT
Start: 2019-10-23 | End: 2019-10-23

## 2019-10-23 RX ORDER — PREDNISONE 20 MG/1
40 TABLET ORAL DAILY
Qty: 10 TABLET | Refills: 0 | Status: SHIPPED | OUTPATIENT
Start: 2019-10-23 | End: 2019-10-23

## 2019-10-23 RX ORDER — ALBUTEROL SULFATE 90 UG/1
2 AEROSOL, METERED RESPIRATORY (INHALATION) EVERY 4 HOURS PRN
Qty: 1 INHALER | Refills: 0 | Status: SHIPPED | OUTPATIENT
Start: 2019-10-23 | End: 2019-10-23

## 2019-10-23 RX ORDER — ALBUTEROL SULFATE 90 UG/1
2 AEROSOL, METERED RESPIRATORY (INHALATION) EVERY 4 HOURS PRN
Qty: 1 INHALER | Refills: 0 | Status: SHIPPED | OUTPATIENT
Start: 2019-10-23 | End: 2019-11-22

## 2019-10-23 NOTE — ED INITIAL ASSESSMENT (HPI)
The patient states he has been SOB with activity, it has been worse the last few days and has been having swelling to the lower extremities.

## 2019-10-23 NOTE — ED PROVIDER NOTES
Patient Seen in: [facility] AND [facility] Emergency Department      History   Patient presents with:  Dyspnea CAMILLE SOB (respiratory)    Stated Complaint: asthma    HPI    Patient is a 79-year-old [address] speaking male with history of HIV who presents with shortn ED Triage Vitals [10/23/19 3182]   /74   Pulse 97   Resp 20   Temp 98.9 °F (37.2 °C)   Temp src Oral   SpO2 96 %   O2 Device None (Room air)       Current:/69   Pulse 99   Temp 98.9 °F (37.2 °C) (Oral)   Resp 17   Ht 172.7 cm (5' 8\")   Wt minimal faint groundglass airspace disease in bilateral lower lobes, left greater than right  U/s RLE: No evidence of acute DVT.   Redemonstration of did located proximal to mid right femoral vein with possible chronic partial nonocclusive DVT similar in ap tablet Refills: 0    !! Albuterol Sulfate  (90 Base) MCG/ACT Inhalation Aero Soln  Inhale 2 puffs into the lungs every 4 (four) hours as needed for Wheezing.   Qty: 1 Inhaler Refills: 0    predniSONE 20 MG Oral Tab  Take 2 tablets (40 mg total) by mo

## 2019-11-15 NOTE — OPERATIVE REPORT
[facility] OPERATING ROOM  Operative Note     Eufemia Rey Location: OR   [facility] [id number] MRN [id number]   Admission Date 3/22/2017 Operation Date 3/31/2017   Attending Physician Philip Wilks MD Operating Physician MD Felipe Baker CHIEF COMPLAINT:  Chad Cash is here today for Subsequent Annual Medicare Wellness Visit.    Medication verified    CHOLESTEROL (mg/dL)   Date Value   11/06/2018 189     HDL (mg/dL)   Date Value   11/06/2018 46     No components found for: CHOLHDLRATIO  TRIGLYCERIDE (mg/dL)   Date Value   11/06/2018 193 (H)     CALCULATED LDL (mg/dL)   Date Value   11/06/2018 104      Glucose (mg/dL)   Date Value   09/30/2019 91     PSA, Total (ng/mL)   Date Value   09/30/2019 0.93         Health Maintenance Due   Topic Date Due   • Shingles Vaccine (2 of 3) 01/16/2017   • Colorectal Cancer Screening-Colonoscopy  02/02/2019   • Influenza Vaccine (1) 09/01/2019   • Medicare Wellness 65+  11/13/2019   • Depression Screening  11/13/2019       HRA ABN answers  7.) Have you had a fall two or more times in the past year?:  Yes     11a.) Bladder Control problems (urine leaking):  Often     11b.) Bowel control problems:  Sometimes     11i.) Problems using the telephone:  Sometimes           Recent PHQ 2/9 Score    PHQ 2:  Date Adult PHQ 2 Score   11/15/2019 0       PHQ 9:        high, making him a suboptimal candidate for ultimate reconstruction. As such, I've offered him either a Girdlestone procedure with placement of antibiotic beads. After discussing the risks/benefits/alternatives of surgery, he has elected to proceed.     Sum using #1 PDS suture. The tensor fascia jarrod was then repaired with #2 Roe Radha in a running fashion. The subcutaneous tissues were closed using 2-0 Monocryl suture in interrupted fashion. The skin was closed with running subcuticular Monocryl.   Sterile Aqu

## 2020-01-06 ENCOUNTER — APPOINTMENT (OUTPATIENT)
Dept: GENERAL RADIOLOGY | Facility: HOSPITAL | Age: 55
End: 2020-01-06
Payer: COMMERCIAL

## 2020-01-06 ENCOUNTER — HOSPITAL ENCOUNTER (EMERGENCY)
Facility: HOSPITAL | Age: 55
Discharge: HOME | End: 2020-01-06
Attending: EMERGENCY MEDICINE
Payer: COMMERCIAL

## 2020-01-06 VITALS
TEMPERATURE: 98 F | HEART RATE: 79 BPM | SYSTOLIC BLOOD PRESSURE: 124 MMHG | HEIGHT: 70 IN | RESPIRATION RATE: 22 BRPM | BODY MASS INDEX: 27.2 KG/M2 | OXYGEN SATURATION: 99 % | DIASTOLIC BLOOD PRESSURE: 87 MMHG | WEIGHT: 190 LBS

## 2020-01-06 DIAGNOSIS — M25.469 KNEE SWELLING: ICD-10-CM

## 2020-01-06 DIAGNOSIS — J45.21 MILD INTERMITTENT ASTHMA WITH EXACERBATION: Primary | ICD-10-CM

## 2020-01-06 PROCEDURE — 99284 EMERGENCY DEPT VISIT MOD MDM: CPT

## 2020-01-06 PROCEDURE — 94640 AIRWAY INHALATION TREATMENT: CPT

## 2020-01-06 PROCEDURE — 73560 X-RAY EXAM OF KNEE 1 OR 2: CPT

## 2020-01-06 PROCEDURE — 71046 X-RAY EXAM CHEST 2 VIEWS: CPT | Performed by: EMERGENCY MEDICINE

## 2020-01-06 RX ORDER — PREDNISONE 20 MG/1
60 TABLET ORAL ONCE
Status: COMPLETED | OUTPATIENT
Start: 2020-01-06 | End: 2020-01-06

## 2020-01-06 RX ORDER — ALBUTEROL SULFATE 90 UG/1
2 AEROSOL, METERED RESPIRATORY (INHALATION) EVERY 4 HOURS PRN
Qty: 1 INHALER | Refills: 0 | Status: SHIPPED | OUTPATIENT
Start: 2020-01-06 | End: 2020-02-05

## 2020-01-06 RX ORDER — PREDNISONE 20 MG/1
40 TABLET ORAL DAILY
Qty: 10 TABLET | Refills: 0 | Status: SHIPPED | OUTPATIENT
Start: 2020-01-06 | End: 2020-01-11

## 2020-01-06 RX ORDER — GUAIFENESIN 600 MG
1200 TABLET, EXTENDED RELEASE 12 HR ORAL 2 TIMES DAILY
Qty: 30 TABLET | Refills: 0 | Status: SHIPPED | OUTPATIENT
Start: 2020-01-06

## 2020-01-06 RX ORDER — IPRATROPIUM BROMIDE AND ALBUTEROL SULFATE 2.5; .5 MG/3ML; MG/3ML
3 SOLUTION RESPIRATORY (INHALATION) ONCE
Status: COMPLETED | OUTPATIENT
Start: 2020-01-06 | End: 2020-01-06

## 2020-01-06 NOTE — ED PROVIDER NOTES
Patient Seen in: [facility] AND [facility] Emergency Department      History   Patient presents with:  Cough/URI  Pain    Stated Complaint: left leg pain     HPI    67-year-old male with history of asthma, hyperlipidemia, HIV, here with complaints of cough for t appetite change, fatigue and fever. HENT: Negative for congestion, ear pain and sore throat. Eyes: Negative for pain, discharge and redness. Respiratory: Positive for cough and shortness of breath. Negative for wheezing.     Cardiovascular: Negative distension. Palpations: Abdomen is soft. Tenderness: There is no tenderness. There is no guarding. Musculoskeletal: Normal range of motion. General: No tenderness. Skin:     General: Skin is warm and dry. Findings: No rash.    Ti ordered  - pt feeling improved        Medical Record Review: I personally reviewed available prior medical records for any recent pertinent discharge summaries, testing, and procedures, and reviewed those reports. Complicating Factors:  The patient allindena mg total) by mouth 2 (two) times daily. Qty: 30 tablet Refills: 0    !! - Potential duplicate medications found. Please discuss with provider.

## 2020-01-06 NOTE — ED INITIAL ASSESSMENT (HPI)
Pt here with productive cough and asthma for the past 2 months. Pt states he has seen a pmd took an antibiotic but no relief. Pt states he coughs up yellow phlegm. Pt also here with right knee swelling that he has had for the past 2 months.  Pt denies t

## 2020-01-23 ENCOUNTER — APPOINTMENT (OUTPATIENT)
Dept: GENERAL RADIOLOGY | Facility: HOSPITAL | Age: 55
End: 2020-01-23
Attending: EMERGENCY MEDICINE
Payer: COMMERCIAL

## 2020-01-23 ENCOUNTER — HOSPITAL ENCOUNTER (EMERGENCY)
Facility: HOSPITAL | Age: 55
Discharge: HOME | End: 2020-01-23
Attending: EMERGENCY MEDICINE
Payer: COMMERCIAL

## 2020-01-23 VITALS
BODY MASS INDEX: 27 KG/M2 | RESPIRATION RATE: 14 BRPM | WEIGHT: 190.06 LBS | OXYGEN SATURATION: 97 % | SYSTOLIC BLOOD PRESSURE: 113 MMHG | DIASTOLIC BLOOD PRESSURE: 67 MMHG | HEART RATE: 101 BPM | TEMPERATURE: 98 F

## 2020-01-23 DIAGNOSIS — J45.21 MILD INTERMITTENT ASTHMA WITH EXACERBATION: Primary | ICD-10-CM

## 2020-01-23 PROCEDURE — 94640 AIRWAY INHALATION TREATMENT: CPT

## 2020-01-23 PROCEDURE — 99284 EMERGENCY DEPT VISIT MOD MDM: CPT

## 2020-01-23 PROCEDURE — 94644 CONT INHLJ TX 1ST HOUR: CPT

## 2020-01-23 PROCEDURE — 71045 X-RAY EXAM CHEST 1 VIEW: CPT | Performed by: EMERGENCY MEDICINE

## 2020-01-23 RX ORDER — PREDNISONE 20 MG/1
60 TABLET ORAL ONCE
Status: COMPLETED | OUTPATIENT
Start: 2020-01-23 | End: 2020-01-23

## 2020-01-23 RX ORDER — ALBUTEROL SULFATE 2.5 MG/3ML
2.5 SOLUTION RESPIRATORY (INHALATION) EVERY 4 HOURS PRN
Qty: 25 AMPULE | Refills: 3 | Status: SHIPPED | OUTPATIENT
Start: 2020-01-23 | End: 2020-01-23

## 2020-01-23 RX ORDER — PREDNISONE 20 MG/1
40 TABLET ORAL DAILY
Qty: 10 TABLET | Refills: 0 | Status: SHIPPED | OUTPATIENT
Start: 2020-01-23 | End: 2020-01-23

## 2020-01-23 RX ORDER — ALBUTEROL SULFATE 90 UG/1
2 AEROSOL, METERED RESPIRATORY (INHALATION) EVERY 4 HOURS PRN
Qty: 1 INHALER | Refills: 0 | Status: SHIPPED | OUTPATIENT
Start: 2020-01-23 | End: 2020-02-22

## 2020-01-23 RX ORDER — ALBUTEROL SULFATE 2.5 MG/3ML
2.5 SOLUTION RESPIRATORY (INHALATION) EVERY 4 HOURS PRN
Qty: 25 AMPULE | Refills: 3 | Status: SHIPPED | OUTPATIENT
Start: 2020-01-23

## 2020-01-23 RX ORDER — PREDNISONE 20 MG/1
40 TABLET ORAL DAILY
Qty: 10 TABLET | Refills: 0 | Status: SHIPPED | OUTPATIENT
Start: 2020-01-23 | End: 2020-01-28

## 2020-01-23 RX ORDER — ALBUTEROL SULFATE 2.5 MG/3ML
10 SOLUTION RESPIRATORY (INHALATION) CONTINUOUS
Status: DISCONTINUED | OUTPATIENT
Start: 2020-01-23 | End: 2020-01-24

## 2020-01-23 RX ORDER — IPRATROPIUM BROMIDE AND ALBUTEROL SULFATE 2.5; .5 MG/3ML; MG/3ML
3 SOLUTION RESPIRATORY (INHALATION) ONCE
Status: COMPLETED | OUTPATIENT
Start: 2020-01-23 | End: 2020-01-23

## 2020-01-23 RX ORDER — ALBUTEROL SULFATE 90 UG/1
2 AEROSOL, METERED RESPIRATORY (INHALATION) EVERY 4 HOURS PRN
Qty: 1 INHALER | Refills: 0 | Status: SHIPPED | OUTPATIENT
Start: 2020-01-23 | End: 2020-01-23

## 2020-01-24 NOTE — ED PROVIDER NOTES
Patient Seen in: [facility] AND [facility] Emergency Department    History   Patient presents with:  Dyspnea CAMILLE SOB      HPI    Patient presents to the ED complaining of shortness of breath and wheezing and coughing for the past 2 days.   History of asthma and c comment: STARTED WHEN HE WAS 12YEARS OLD    Substance and Sexual Activity      Alcohol use: No        Frequency: Never      Drug use: No        Comment: stopped a year and a half ago    Social History Narrative      ** Merged History Encounter ** Isela Miranda MD on 1/23/2020 at 21:17     Approved by (CST): Isela Miranda MD on 1/23/2020 at 21:20          ED Medications Administered:   Medications   albuterol sulfate (VENTOLIN) (2.5 MG/3ML) 0.083% nebulizer solution 10 mg (10 mg Nebulizati diagnosis)    Disposition:  Discharge    Follow-up:  Isma Gayle  [address] [facility] [id number]    Schedule an appointment as soon as possible for a visit in 3 days        Medications Prescribed:  Discharge Medication List as of

## 2020-01-24 NOTE — ED INITIAL ASSESSMENT (HPI)
Pt to ED by self with c/o dyspnea without chest pain for 2 days. +productive cough and fever at home. Pt with hx of asthma. Pt states he ran out of nebulizer tx at home. Pt is alert and oriented x4.

## 2020-02-17 ENCOUNTER — HOSPITAL ENCOUNTER (EMERGENCY)
Facility: HOSPITAL | Age: 55
Discharge: LEFT BEFORE DISCHARGE | End: 2020-02-18
Attending: EMERGENCY MEDICINE
Payer: COMMERCIAL

## 2020-02-17 DIAGNOSIS — B20 HIV DISEASE (HCC): ICD-10-CM

## 2020-02-17 DIAGNOSIS — R79.1 SUBTHERAPEUTIC INTERNATIONAL NORMALIZED RATIO (INR): ICD-10-CM

## 2020-02-17 DIAGNOSIS — J98.01 BRONCHOSPASM: ICD-10-CM

## 2020-02-17 DIAGNOSIS — B34.9 VIRAL SYNDROME: Primary | ICD-10-CM

## 2020-02-17 DIAGNOSIS — J10.1 INFLUENZA A: ICD-10-CM

## 2020-02-17 DIAGNOSIS — J69.0 ASPIRATION PNEUMONITIS (HCC): ICD-10-CM

## 2020-02-17 PROCEDURE — 36415 COLL VENOUS BLD VENIPUNCTURE: CPT

## 2020-02-17 PROCEDURE — 94640 AIRWAY INHALATION TREATMENT: CPT

## 2020-02-17 PROCEDURE — 96375 TX/PRO/DX INJ NEW DRUG ADDON: CPT

## 2020-02-17 PROCEDURE — 96374 THER/PROPH/DIAG INJ IV PUSH: CPT

## 2020-02-17 PROCEDURE — 99285 EMERGENCY DEPT VISIT HI MDM: CPT

## 2020-02-17 PROCEDURE — 96361 HYDRATE IV INFUSION ADD-ON: CPT

## 2020-02-17 RX ORDER — IPRATROPIUM BROMIDE AND ALBUTEROL SULFATE 2.5; .5 MG/3ML; MG/3ML
3 SOLUTION RESPIRATORY (INHALATION) ONCE
Status: COMPLETED | OUTPATIENT
Start: 2020-02-17 | End: 2020-02-17

## 2020-02-18 ENCOUNTER — APPOINTMENT (OUTPATIENT)
Dept: GENERAL RADIOLOGY | Facility: HOSPITAL | Age: 55
End: 2020-02-18
Attending: EMERGENCY MEDICINE
Payer: COMMERCIAL

## 2020-02-18 ENCOUNTER — APPOINTMENT (OUTPATIENT)
Dept: CT IMAGING | Facility: HOSPITAL | Age: 55
End: 2020-02-18
Attending: EMERGENCY MEDICINE
Payer: COMMERCIAL

## 2020-02-18 VITALS
BODY MASS INDEX: 27.49 KG/M2 | TEMPERATURE: 99 F | HEIGHT: 70 IN | HEART RATE: 96 BPM | DIASTOLIC BLOOD PRESSURE: 70 MMHG | OXYGEN SATURATION: 96 % | SYSTOLIC BLOOD PRESSURE: 114 MMHG | WEIGHT: 192 LBS | RESPIRATION RATE: 14 BRPM

## 2020-02-18 PROBLEM — B34.9 VIRAL SYNDROME: Status: ACTIVE | Noted: 2020-02-18

## 2020-02-18 LAB
ALBUMIN SERPL-MCNC: 2.9 G/DL (ref 3.4–5)
ALP LIVER SERPL-CCNC: 86 U/L (ref 45–117)
ALT SERPL-CCNC: 41 U/L (ref 16–61)
ANION GAP SERPL CALC-SCNC: 2 MMOL/L (ref 0–18)
AST SERPL-CCNC: 72 U/L (ref 15–37)
BASOPHILS # BLD AUTO: 0.02 X10(3) UL (ref 0–0.2)
BASOPHILS NFR BLD AUTO: 0.4 %
BILIRUB DIRECT SERPL-MCNC: 0.2 MG/DL (ref 0–0.2)
BILIRUB SERPL-MCNC: 0.6 MG/DL (ref 0.1–2)
BUN BLD-MCNC: 25 MG/DL (ref 7–18)
BUN/CREAT SERPL: 20.3 (ref 10–20)
CALCIUM BLD-MCNC: 8 MG/DL (ref 8.5–10.1)
CHLORIDE SERPL-SCNC: 102 MMOL/L (ref 98–112)
CO2 SERPL-SCNC: 30 MMOL/L (ref 21–32)
CREAT BLD-MCNC: 1.23 MG/DL (ref 0.7–1.3)
DEPRECATED RDW RBC AUTO: 50.1 FL (ref 35.1–46.3)
EOSINOPHIL # BLD AUTO: 0.06 X10(3) UL (ref 0–0.7)
EOSINOPHIL NFR BLD AUTO: 1.2 %
ERYTHROCYTE [DISTWIDTH] IN BLOOD BY AUTOMATED COUNT: 15.9 % (ref 11–15)
FLUAV + FLUBV RNA SPEC NAA+PROBE: NEGATIVE
FLUAV + FLUBV RNA SPEC NAA+PROBE: NEGATIVE
FLUAV + FLUBV RNA SPEC NAA+PROBE: POSITIVE
GLUCOSE BLD-MCNC: 69 MG/DL (ref 70–99)
GLUCOSE BLDC GLUCOMTR-MCNC: 133 MG/DL (ref 70–99)
HCT VFR BLD AUTO: 39.1 % (ref 39–53)
HGB BLD-MCNC: 12.6 G/DL (ref 13–17.5)
IMM GRANULOCYTES # BLD AUTO: 0.04 X10(3) UL (ref 0–1)
IMM GRANULOCYTES NFR BLD: 0.8 %
INR BLD: 1.08 (ref 0.9–1.2)
LACTATE SERPL-SCNC: 1.1 MMOL/L (ref 0.4–2)
LYMPHOCYTES # BLD AUTO: 0.82 X10(3) UL (ref 1–4)
LYMPHOCYTES NFR BLD AUTO: 16.6 %
M PROTEIN MFR SERPL ELPH: 7.9 G/DL (ref 6.4–8.2)
MCH RBC QN AUTO: 28.1 PG (ref 26–34)
MCHC RBC AUTO-ENTMCNC: 32.2 G/DL (ref 31–37)
MCV RBC AUTO: 87.1 FL (ref 80–100)
MONOCYTES # BLD AUTO: 0.69 X10(3) UL (ref 0.1–1)
MONOCYTES NFR BLD AUTO: 13.9 %
NEUTROPHILS # BLD AUTO: 3.32 X10 (3) UL (ref 1.5–7.7)
NEUTROPHILS # BLD AUTO: 3.32 X10(3) UL (ref 1.5–7.7)
NEUTROPHILS NFR BLD AUTO: 67.1 %
OSMOLALITY SERPL CALC.SUM OF ELEC: 281 MOSM/KG (ref 275–295)
PLATELET # BLD AUTO: 297 10(3)UL (ref 150–450)
POTASSIUM SERPL-SCNC: 4.5 MMOL/L (ref 3.5–5.1)
PROCALCITONIN SERPL-MCNC: 0.13 NG/ML
PROTHROMBIN TIME: 13.8 SECONDS (ref 11.8–14.5)
RBC # BLD AUTO: 4.49 X10(6)UL (ref 4.3–5.7)
SODIUM SERPL-SCNC: 134 MMOL/L (ref 136–145)
WBC # BLD AUTO: 5 X10(3) UL (ref 4–11)

## 2020-02-18 PROCEDURE — 71046 X-RAY EXAM CHEST 2 VIEWS: CPT | Performed by: EMERGENCY MEDICINE

## 2020-02-18 PROCEDURE — 80076 HEPATIC FUNCTION PANEL: CPT | Performed by: EMERGENCY MEDICINE

## 2020-02-18 PROCEDURE — 80048 BASIC METABOLIC PNL TOTAL CA: CPT | Performed by: EMERGENCY MEDICINE

## 2020-02-18 PROCEDURE — 87040 BLOOD CULTURE FOR BACTERIA: CPT | Performed by: EMERGENCY MEDICINE

## 2020-02-18 PROCEDURE — 71260 CT THORAX DX C+: CPT | Performed by: EMERGENCY MEDICINE

## 2020-02-18 PROCEDURE — 94640 AIRWAY INHALATION TREATMENT: CPT

## 2020-02-18 PROCEDURE — 83605 ASSAY OF LACTIC ACID: CPT | Performed by: EMERGENCY MEDICINE

## 2020-02-18 PROCEDURE — 82962 GLUCOSE BLOOD TEST: CPT

## 2020-02-18 PROCEDURE — 85025 COMPLETE CBC W/AUTO DIFF WBC: CPT | Performed by: EMERGENCY MEDICINE

## 2020-02-18 PROCEDURE — 85610 PROTHROMBIN TIME: CPT | Performed by: EMERGENCY MEDICINE

## 2020-02-18 PROCEDURE — 74177 CT ABD & PELVIS W/CONTRAST: CPT | Performed by: EMERGENCY MEDICINE

## 2020-02-18 PROCEDURE — 87631 RESP VIRUS 3-5 TARGETS: CPT | Performed by: EMERGENCY MEDICINE

## 2020-02-18 PROCEDURE — 84145 PROCALCITONIN (PCT): CPT | Performed by: EMERGENCY MEDICINE

## 2020-02-18 RX ORDER — PREDNISONE 20 MG/1
60 TABLET ORAL DAILY
Qty: 15 TABLET | Refills: 0 | Status: SHIPPED | OUTPATIENT
Start: 2020-02-18 | End: 2020-02-23

## 2020-02-18 RX ORDER — OSELTAMIVIR PHOSPHATE 75 MG/1
75 CAPSULE ORAL ONCE
Status: COMPLETED | OUTPATIENT
Start: 2020-02-18 | End: 2020-02-18

## 2020-02-18 RX ORDER — OSELTAMIVIR PHOSPHATE 75 MG/1
75 CAPSULE ORAL 2 TIMES DAILY
Qty: 10 CAPSULE | Refills: 0 | Status: SHIPPED | OUTPATIENT
Start: 2020-02-18 | End: 2020-02-23

## 2020-02-18 RX ORDER — ONDANSETRON 2 MG/ML
4 INJECTION INTRAMUSCULAR; INTRAVENOUS ONCE
Status: DISCONTINUED | OUTPATIENT
Start: 2020-02-18 | End: 2020-02-18

## 2020-02-18 RX ORDER — DIPHENHYDRAMINE HYDROCHLORIDE 50 MG/ML
25 INJECTION INTRAMUSCULAR; INTRAVENOUS ONCE
Status: COMPLETED | OUTPATIENT
Start: 2020-02-18 | End: 2020-02-18

## 2020-02-18 RX ORDER — ONDANSETRON 2 MG/ML
4 INJECTION INTRAMUSCULAR; INTRAVENOUS ONCE
Status: COMPLETED | OUTPATIENT
Start: 2020-02-18 | End: 2020-02-18

## 2020-02-18 RX ORDER — ALBUTEROL SULFATE 2.5 MG/3ML
2.5 SOLUTION RESPIRATORY (INHALATION) EVERY 4 HOURS PRN
Qty: 30 AMPULE | Refills: 0 | Status: SHIPPED | OUTPATIENT
Start: 2020-02-18 | End: 2020-03-19

## 2020-02-18 RX ORDER — AZITHROMYCIN 250 MG/1
TABLET, FILM COATED ORAL
Qty: 1 PACKAGE | Refills: 0 | Status: SHIPPED | OUTPATIENT
Start: 2020-02-18

## 2020-02-18 RX ORDER — ALBUTEROL SULFATE 90 UG/1
2 AEROSOL, METERED RESPIRATORY (INHALATION) EVERY 4 HOURS PRN
Qty: 1 INHALER | Refills: 0 | Status: SHIPPED | OUTPATIENT
Start: 2020-02-18 | End: 2020-03-19

## 2020-02-18 RX ORDER — METHYLPREDNISOLONE SODIUM SUCCINATE 125 MG/2ML
125 INJECTION, POWDER, LYOPHILIZED, FOR SOLUTION INTRAMUSCULAR; INTRAVENOUS ONCE
Status: COMPLETED | OUTPATIENT
Start: 2020-02-18 | End: 2020-02-18

## 2020-02-18 RX ORDER — DIPHENHYDRAMINE HYDROCHLORIDE 50 MG/ML
25 INJECTION INTRAMUSCULAR; INTRAVENOUS ONCE
Status: DISCONTINUED | OUTPATIENT
Start: 2020-02-18 | End: 2020-02-18

## 2020-02-18 RX ORDER — IPRATROPIUM BROMIDE AND ALBUTEROL SULFATE 2.5; .5 MG/3ML; MG/3ML
3 SOLUTION RESPIRATORY (INHALATION) ONCE
Status: COMPLETED | OUTPATIENT
Start: 2020-02-18 | End: 2020-02-18

## 2020-02-18 RX ORDER — ACETAMINOPHEN 325 MG/1
650 TABLET ORAL ONCE
Status: COMPLETED | OUTPATIENT
Start: 2020-02-18 | End: 2020-02-18

## 2020-02-18 RX ORDER — AZITHROMYCIN 250 MG/1
TABLET, FILM COATED ORAL
Qty: 1 PACKAGE | Refills: 0 | Status: SHIPPED | OUTPATIENT
Start: 2020-02-18 | End: 2020-02-18

## 2020-02-18 NOTE — ED NOTES
PT STARTED ON OXYGEN INHALATION AT 3LPM BY NASAL CANNULA. PT IS DESATTING AT 88 %. DR Marlene Byrne, GIVE VERBAL ORDER TO START PT ON OXYGEN INHALATION.

## 2020-02-18 NOTE — ED INITIAL ASSESSMENT (HPI)
C/o Asthma attack x3 days. Subjective fever x3 days with chills. No medications taken pta. Pt also c/o bleeding hemorrhoids.

## 2020-02-18 NOTE — ED NOTES
ASSUMED CARE TO THIS PT WHO CAME IN PER WHEELCHAIR FROM TRIAGE TO ROOM 31 FOR COMPLAINTS OF SOB, ASTHMA ATTACK X 3 DAYS WITH FEVER AND CHILLS, PT ALSO COMPLAINING OF BLEEDING HEMORRHOIDS AND DECREASE APPETITE. PT IS TAKING BLOOD THINNER WARFARIN.   PT HAS

## 2020-02-18 NOTE — ED NOTES
PT SIGNED THE AMA FORM AFTER DR Araseli Toro EXPLAINED TO HIM THE RISK INVOLVED IN LIVING THE HOSPITAL AGAINST MEDICAL ADVISE. DISCHARGE INSTRUCTION AND PRESCRIPTIONS  DISCUSSED WITH THE PT USING LANGUAGE LINE SOLUTION ID # [id number].

## 2020-02-18 NOTE — ED NOTES
Pt states he will be leaving AMA. Pt refusing to be admitted. Dr. Agustin Camera at pt bedside discussion risks of pt leaving AMA.

## 2020-02-18 NOTE — ED NOTES
The writer stays with the pt is CT room to observe for any reaction to dye. Pt states that he has allergy to IV dye. But in pt's chart pt had multiple CT done with contrast but no reaction.

## 2020-02-18 NOTE — ED PROVIDER NOTES
Patient Seen in: [facility] AND [facility] Emergency Department      History   Patient presents with:  Dyspnea CAMILLE SOB    Stated Complaint: asthma    HPI    26-year-old male with a complicated past medical history with a primary doctor elsewhere who reportedly i ago             Review of Systems    Positive for stated complaint: asthma  Other systems are as noted in HPI. Constitutional and vital signs reviewed. All other systems reviewed and negative except as noted above.     Physical Exam     ED Triage Anne insufficiency, sepsis and bacteremia.       ED Course     Labs Reviewed   BASIC METABOLIC PANEL (8) - Abnormal; Notable for the following components:       Result Value    Glucose 69 (*)     Sodium 134 (*)     BUN 25 (*)     BUN/CREA Ratio 20.3 (*)     Calc Significant consolidations, however, not seen. Otherwise similar              MDM     Patient will be admitted to Dr. Forest Alcala. Vitals stable.   Patient is somewhat unsure about his history making his overall initial work-up here in the emergency depart today          Medications Prescribed:  Discharge Medication List as of 2/18/2020  2:54 AM    START taking these medications    predniSONE 20 MG Oral Tab  Take 3 tablets (60 mg total) by mouth daily for 5 days. , Print, Disp-15 tablet, R-0    !!  Albuterol S

## 2020-04-29 NOTE — PLAN OF CARE
Left VM message for patient to return call.    Problem: Patient/Family Goals  Goal: Patient/Family Long Term Goal  Patient’s Long Term Goal: To go home    Interventions:  - Adequate pain control  -Return to baseline  -Free from injury  -Free from infection  - See additional Care Plan goals for specific reduce risk of injury  - Provide assistive devices as appropriate  - Consider OT/PT consult to assist with strengthening/mobility  - Encourage toileting schedule   Outcome: Progressing  Patient up with rolling chair and assist.    Problem: DISCHARGE PLANNI like us to know as we care for you?  I am Danish speaking  - Provide timely, complete, and accurate information to patient/family  - Incorporate patient and family knowledge, values, beliefs, and cultural backgrounds into the planning and delivery of care

## 2020-05-29 ENCOUNTER — HOSPITAL ENCOUNTER (EMERGENCY)
Facility: HOSPITAL | Age: 55
Discharge: LEFT BEFORE DISCHARGE | End: 2020-05-29
Attending: EMERGENCY MEDICINE
Payer: MEDICAID

## 2020-05-29 ENCOUNTER — APPOINTMENT (OUTPATIENT)
Dept: GENERAL RADIOLOGY | Facility: HOSPITAL | Age: 55
End: 2020-05-29
Attending: EMERGENCY MEDICINE
Payer: MEDICAID

## 2020-05-29 VITALS
SYSTOLIC BLOOD PRESSURE: 112 MMHG | WEIGHT: 192 LBS | TEMPERATURE: 98 F | OXYGEN SATURATION: 99 % | DIASTOLIC BLOOD PRESSURE: 78 MMHG | BODY MASS INDEX: 27.49 KG/M2 | HEIGHT: 70 IN | RESPIRATION RATE: 13 BRPM | HEART RATE: 107 BPM

## 2020-05-29 DIAGNOSIS — J18.9 PNEUMONIA OF RIGHT UPPER LOBE DUE TO INFECTIOUS ORGANISM: Primary | ICD-10-CM

## 2020-05-29 DIAGNOSIS — B20 HIV DISEASE (HCC): ICD-10-CM

## 2020-05-29 PROCEDURE — 84484 ASSAY OF TROPONIN QUANT: CPT | Performed by: EMERGENCY MEDICINE

## 2020-05-29 PROCEDURE — 99284 EMERGENCY DEPT VISIT MOD MDM: CPT

## 2020-05-29 PROCEDURE — 93010 ELECTROCARDIOGRAM REPORT: CPT | Performed by: EMERGENCY MEDICINE

## 2020-05-29 PROCEDURE — 83880 ASSAY OF NATRIURETIC PEPTIDE: CPT | Performed by: EMERGENCY MEDICINE

## 2020-05-29 PROCEDURE — 71045 X-RAY EXAM CHEST 1 VIEW: CPT | Performed by: EMERGENCY MEDICINE

## 2020-05-29 PROCEDURE — 85025 COMPLETE CBC W/AUTO DIFF WBC: CPT | Performed by: EMERGENCY MEDICINE

## 2020-05-29 PROCEDURE — 36415 COLL VENOUS BLD VENIPUNCTURE: CPT

## 2020-05-29 PROCEDURE — 80048 BASIC METABOLIC PNL TOTAL CA: CPT | Performed by: EMERGENCY MEDICINE

## 2020-05-29 PROCEDURE — 85610 PROTHROMBIN TIME: CPT | Performed by: EMERGENCY MEDICINE

## 2020-05-29 PROCEDURE — 93005 ELECTROCARDIOGRAM TRACING: CPT

## 2020-05-29 RX ORDER — LEVOFLOXACIN 750 MG/1
750 TABLET ORAL DAILY
Qty: 10 TABLET | Refills: 0 | Status: SHIPPED | OUTPATIENT
Start: 2020-05-29 | End: 2020-06-08

## 2020-05-29 NOTE — ED PROVIDER NOTES
Patient Seen in: [facility] Emergency Department      History   Patient presents with:  Dyspnea CAMILLE SOB    Stated Complaint: SOB    HPI    42-year-old male with history of hypertension, HIV, asthma, and DVT for which she was previously taking Coum Never    Drug use: No      Comment: stopped a year and a half ago             Review of Systems    Positive for stated complaint: SOB  Other systems are as noted in HPI. Constitutional and vital signs reviewed.       All other systems reviewed and negative RDW 15.2 (*)     Lymphocyte Absolute 0.51 (*)     All other components within normal limits   TROPONIN I - Normal   CBC WITH DIFFERENTIAL WITH PLATELET    Narrative: The following orders were created for panel order CBC WITH DIFFERENTIAL WITH PLATELET. hospital without the guarantee of getting his methadone. I discussed with him the likelihood of infection and potential worsening of symptoms with consequences up to and including death.   The patient voices an understanding of this but wishes to leave AGA

## 2020-05-29 NOTE — ED NOTES
Pt with hx of asthma, DVT RLE, lower extremity edema, HIV, c/o worsening dyspnea, cough with yellow sputum. Pt also reports negative COVID test that was done today.

## 2020-05-29 NOTE — CM/SW NOTE
Request by ED MD and RN regarding information on methadone  while an inpatient.  Spoke anthony FONSECA who states the Pt must produce the name of the methadone clinic where they get their methadone, [address] [place] [address] pharmacy will contact the clinic to get information regarding

## 2020-05-29 NOTE — ED NOTES
MD spoke to pt using language line. Pt states that he has been noncompliant with HIV medication, anticoagulants for the past few weeks and has not had his methadone in a few days.  He is concerned that he will not receive his methadone while admitted and do

## 2020-05-29 NOTE — ED INITIAL ASSESSMENT (HPI)
Pt came in per wheelchair in triage for evaluation of SOB and swelling to bilateral lower leg that has been going for a months especially going up the stairs.   Pt was tested with COVID today, came back negative,  Pt has history of asthma and blood clot to

## 2020-06-07 ENCOUNTER — HOSPITAL ENCOUNTER (EMERGENCY)
Facility: HOSPITAL | Age: 55
Discharge: LEFT BEFORE DISCHARGE | End: 2020-06-07
Attending: EMERGENCY MEDICINE
Payer: MEDICAID

## 2020-06-07 ENCOUNTER — APPOINTMENT (OUTPATIENT)
Dept: CT IMAGING | Facility: HOSPITAL | Age: 55
End: 2020-06-07
Attending: EMERGENCY MEDICINE
Payer: MEDICAID

## 2020-06-07 ENCOUNTER — APPOINTMENT (OUTPATIENT)
Dept: GENERAL RADIOLOGY | Facility: HOSPITAL | Age: 55
End: 2020-06-07
Attending: EMERGENCY MEDICINE
Payer: MEDICAID

## 2020-06-07 VITALS
HEART RATE: 114 BPM | RESPIRATION RATE: 15 BRPM | TEMPERATURE: 102 F | SYSTOLIC BLOOD PRESSURE: 121 MMHG | BODY MASS INDEX: 27.49 KG/M2 | HEIGHT: 65 IN | OXYGEN SATURATION: 93 % | DIASTOLIC BLOOD PRESSURE: 69 MMHG | WEIGHT: 165 LBS

## 2020-06-07 DIAGNOSIS — J45.901 MODERATE ASTHMA WITH EXACERBATION, UNSPECIFIED WHETHER PERSISTENT: Primary | ICD-10-CM

## 2020-06-07 PROCEDURE — 85610 PROTHROMBIN TIME: CPT | Performed by: EMERGENCY MEDICINE

## 2020-06-07 PROCEDURE — 99284 EMERGENCY DEPT VISIT MOD MDM: CPT

## 2020-06-07 PROCEDURE — 93010 ELECTROCARDIOGRAM REPORT: CPT | Performed by: EMERGENCY MEDICINE

## 2020-06-07 PROCEDURE — 80048 BASIC METABOLIC PNL TOTAL CA: CPT | Performed by: EMERGENCY MEDICINE

## 2020-06-07 PROCEDURE — 83880 ASSAY OF NATRIURETIC PEPTIDE: CPT | Performed by: EMERGENCY MEDICINE

## 2020-06-07 PROCEDURE — 81001 URINALYSIS AUTO W/SCOPE: CPT | Performed by: EMERGENCY MEDICINE

## 2020-06-07 PROCEDURE — 96374 THER/PROPH/DIAG INJ IV PUSH: CPT

## 2020-06-07 PROCEDURE — 85025 COMPLETE CBC W/AUTO DIFF WBC: CPT | Performed by: EMERGENCY MEDICINE

## 2020-06-07 PROCEDURE — 93005 ELECTROCARDIOGRAM TRACING: CPT

## 2020-06-07 PROCEDURE — 85730 THROMBOPLASTIN TIME PARTIAL: CPT | Performed by: EMERGENCY MEDICINE

## 2020-06-07 PROCEDURE — 71045 X-RAY EXAM CHEST 1 VIEW: CPT | Performed by: EMERGENCY MEDICINE

## 2020-06-07 PROCEDURE — 84484 ASSAY OF TROPONIN QUANT: CPT | Performed by: EMERGENCY MEDICINE

## 2020-06-07 RX ORDER — METHYLPREDNISOLONE SODIUM SUCCINATE 125 MG/2ML
125 INJECTION, POWDER, LYOPHILIZED, FOR SOLUTION INTRAMUSCULAR; INTRAVENOUS ONCE
Status: COMPLETED | OUTPATIENT
Start: 2020-06-07 | End: 2020-06-07

## 2020-06-07 RX ORDER — PREDNISONE 20 MG/1
40 TABLET ORAL DAILY
Qty: 10 TABLET | Refills: 0 | Status: SHIPPED | OUTPATIENT
Start: 2020-06-07 | End: 2020-06-12

## 2020-06-07 RX ORDER — ALBUTEROL SULFATE 90 UG/1
8 AEROSOL, METERED RESPIRATORY (INHALATION) ONCE
Status: DISCONTINUED | OUTPATIENT
Start: 2020-06-07 | End: 2020-06-07

## 2020-06-07 RX ORDER — ALBUTEROL SULFATE 90 UG/1
2 AEROSOL, METERED RESPIRATORY (INHALATION) EVERY 4 HOURS PRN
Qty: 1 INHALER | Refills: 0 | Status: SHIPPED | OUTPATIENT
Start: 2020-06-07 | End: 2020-07-07

## 2020-06-07 RX ORDER — ALBUTEROL SULFATE 90 UG/1
2 AEROSOL, METERED RESPIRATORY (INHALATION) EVERY 4 HOURS PRN
Qty: 1 INHALER | Refills: 0 | Status: SHIPPED | OUTPATIENT
Start: 2020-06-07 | End: 2020-06-07

## 2020-06-07 RX ORDER — PREDNISONE 20 MG/1
40 TABLET ORAL DAILY
Qty: 10 TABLET | Refills: 0 | Status: SHIPPED | OUTPATIENT
Start: 2020-06-07 | End: 2020-06-07

## 2020-06-07 NOTE — ED PROVIDER NOTES
Patient Seen in: [facility] AND [facility] Emergency Department      History   Patient presents with:  Dyspnea CAMILLE SOB    Stated Complaint: asthma    HPI  79-year-old male with asthma, HIV, right lower extremity DVT noncompliant with Coumadin status post IVC rosy asthma  Other systems are as noted in HPI. Constitutional and vital signs reviewed. All other systems reviewed and negative except as noted above. Physical Exam     ED Triage Vitals [06/07/20 0014]   /59   Pulse (!) 122   Resp 20   Temp 100. Natriuretic Peptide 131 (*)     All other components within normal limits   BASIC METABOLIC PANEL (8) - Abnormal; Notable for the following components:    Sodium 135 (*)     BUN 24 (*)     Creatinine 1.31 (*)     Calcium, Total 8.0 (*)     All other compon patchy groundglass opacities. No pleural effusion or pneumothorax. Nonurgent CT scan of the chest to assess for suspected right perihilar mass versus less likely, chronic consolidative process is recommended. No pleural effusion or pneumothorax.   [place] Despite the above information the patient had made the decision to leave [address] I have attempted to persuade the patient to follow-up with the physician ASAP.   I have also notified the patient that they may return at any time to the ED fo

## 2020-06-07 NOTE — ED NOTES
I was called to bedside to mediate with patient and Dr. Marina Mott with  #[phone] through Melon #usemelon. Through assessment it was established that the patient was alert and oriented X 4 of 4 with a GCS of 15.  The patient was informe

## 2020-06-07 NOTE — ED NOTES
Pt provided and explained d/c instructions, at-home care, follow-up, and rx with assistance of  Lacho Brink. Pt signed Vietnamese version of ama form. Pt in nad at this time. Iv access d/c. Vss. Luna. Ambulatory with cane. A&ox3.  Belongings with pt

## 2020-08-10 ENCOUNTER — HOSPITAL ENCOUNTER (EMERGENCY)
Facility: HOSPITAL | Age: 55
Discharge: HOME | End: 2020-08-10
Attending: EMERGENCY MEDICINE
Payer: MEDICAID

## 2020-08-10 ENCOUNTER — APPOINTMENT (OUTPATIENT)
Dept: GENERAL RADIOLOGY | Facility: HOSPITAL | Age: 55
End: 2020-08-10
Attending: EMERGENCY MEDICINE
Payer: MEDICAID

## 2020-08-10 VITALS
DIASTOLIC BLOOD PRESSURE: 71 MMHG | BODY MASS INDEX: 25.76 KG/M2 | HEIGHT: 68 IN | RESPIRATION RATE: 18 BRPM | SYSTOLIC BLOOD PRESSURE: 105 MMHG | WEIGHT: 170 LBS | TEMPERATURE: 99 F | HEART RATE: 85 BPM | OXYGEN SATURATION: 95 %

## 2020-08-10 DIAGNOSIS — R93.89 ABNORMAL CHEST X-RAY: ICD-10-CM

## 2020-08-10 DIAGNOSIS — J18.9 COMMUNITY ACQUIRED PNEUMONIA OF RIGHT LUNG, UNSPECIFIED PART OF LUNG: Primary | ICD-10-CM

## 2020-08-10 LAB
ALBUMIN SERPL-MCNC: 2.7 G/DL (ref 3.4–5)
ALP LIVER SERPL-CCNC: 124 U/L (ref 45–117)
ALT SERPL-CCNC: 37 U/L (ref 16–61)
ANION GAP SERPL CALC-SCNC: 3 MMOL/L (ref 0–18)
AST SERPL-CCNC: 49 U/L (ref 15–37)
BASOPHILS # BLD AUTO: 0.02 X10(3) UL (ref 0–0.2)
BASOPHILS NFR BLD AUTO: 0.6 %
BILIRUB DIRECT SERPL-MCNC: 0.1 MG/DL (ref 0–0.2)
BILIRUB SERPL-MCNC: 0.3 MG/DL (ref 0.1–2)
BUN BLD-MCNC: 21 MG/DL (ref 7–18)
BUN/CREAT SERPL: 18.1 (ref 10–20)
CALCIUM BLD-MCNC: 8.2 MG/DL (ref 8.5–10.1)
CHLORIDE SERPL-SCNC: 105 MMOL/L (ref 98–112)
CO2 SERPL-SCNC: 31 MMOL/L (ref 21–32)
CREAT BLD-MCNC: 1.16 MG/DL (ref 0.7–1.3)
DEPRECATED RDW RBC AUTO: 59 FL (ref 35.1–46.3)
EOSINOPHIL # BLD AUTO: 0.09 X10(3) UL (ref 0–0.7)
EOSINOPHIL NFR BLD AUTO: 2.6 %
ERYTHROCYTE [DISTWIDTH] IN BLOOD BY AUTOMATED COUNT: 18.4 % (ref 11–15)
GLUCOSE BLD-MCNC: 82 MG/DL (ref 70–99)
HCT VFR BLD AUTO: 39.1 % (ref 39–53)
HGB BLD-MCNC: 12.1 G/DL (ref 13–17.5)
IMM GRANULOCYTES # BLD AUTO: 0.04 X10(3) UL (ref 0–1)
IMM GRANULOCYTES NFR BLD: 1.2 %
LYMPHOCYTES # BLD AUTO: 0.61 X10(3) UL (ref 1–4)
LYMPHOCYTES NFR BLD AUTO: 17.8 %
M PROTEIN MFR SERPL ELPH: 8.6 G/DL (ref 6.4–8.2)
MCH RBC QN AUTO: 27.3 PG (ref 26–34)
MCHC RBC AUTO-ENTMCNC: 30.9 G/DL (ref 31–37)
MCV RBC AUTO: 88.1 FL (ref 80–100)
MONOCYTES # BLD AUTO: 0.62 X10(3) UL (ref 0.1–1)
MONOCYTES NFR BLD AUTO: 18.1 %
NEUTROPHILS # BLD AUTO: 2.05 X10 (3) UL (ref 1.5–7.7)
NEUTROPHILS # BLD AUTO: 2.05 X10(3) UL (ref 1.5–7.7)
NEUTROPHILS NFR BLD AUTO: 59.7 %
OSMOLALITY SERPL CALC.SUM OF ELEC: 290 MOSM/KG (ref 275–295)
PLATELET # BLD AUTO: 185 10(3)UL (ref 150–450)
POTASSIUM SERPL-SCNC: 4.3 MMOL/L (ref 3.5–5.1)
RBC # BLD AUTO: 4.44 X10(6)UL (ref 4.3–5.7)
SARS-COV-2 RNA RESP QL NAA+PROBE: NOT DETECTED
SODIUM SERPL-SCNC: 139 MMOL/L (ref 136–145)
TROPONIN I SERPL-MCNC: <0.045 NG/ML (ref ?–0.04)
WBC # BLD AUTO: 3.4 X10(3) UL (ref 4–11)

## 2020-08-10 PROCEDURE — 93005 ELECTROCARDIOGRAM TRACING: CPT

## 2020-08-10 PROCEDURE — 93010 ELECTROCARDIOGRAM REPORT: CPT | Performed by: EMERGENCY MEDICINE

## 2020-08-10 PROCEDURE — 84484 ASSAY OF TROPONIN QUANT: CPT | Performed by: EMERGENCY MEDICINE

## 2020-08-10 PROCEDURE — 36415 COLL VENOUS BLD VENIPUNCTURE: CPT

## 2020-08-10 PROCEDURE — 85025 COMPLETE CBC W/AUTO DIFF WBC: CPT | Performed by: EMERGENCY MEDICINE

## 2020-08-10 PROCEDURE — 71045 X-RAY EXAM CHEST 1 VIEW: CPT | Performed by: EMERGENCY MEDICINE

## 2020-08-10 PROCEDURE — 99284 EMERGENCY DEPT VISIT MOD MDM: CPT

## 2020-08-10 PROCEDURE — 80048 BASIC METABOLIC PNL TOTAL CA: CPT | Performed by: EMERGENCY MEDICINE

## 2020-08-10 PROCEDURE — 80076 HEPATIC FUNCTION PANEL: CPT | Performed by: EMERGENCY MEDICINE

## 2020-08-10 RX ORDER — AMOXICILLIN AND CLAVULANATE POTASSIUM 875; 125 MG/1; MG/1
1 TABLET, FILM COATED ORAL 2 TIMES DAILY
Qty: 20 TABLET | Refills: 0 | Status: SHIPPED | OUTPATIENT
Start: 2020-08-10 | End: 2020-08-20

## 2020-08-11 NOTE — ED NOTES
Strong cough noted upon assessment, patient states he has \"been tested for covid 20 million times\". Patient denies fevers. Patient denies pain, and denies any other complaints at this time.      When asked if wife could come to  patient, he sta

## 2020-08-11 NOTE — ED INITIAL ASSESSMENT (HPI)
Patient known heroin user. Patient's family called stating when they got home patient was sitting outside in his car, sleeping. Primarily Mosotho speaking, but patient denies any drug use today.

## 2020-08-11 NOTE — ED PROVIDER NOTES
Patient Seen in: [facility] AND [facility] Emergency Department      History   Patient presents with:  Altered Mental Status    Stated Complaint: ams    HPI    Patient is a 70-year-old man he speaks Luxembourgish is staff here translates for me.   He reportedly was sle Frequency: Never    Drug use: No      Comment: stopped a year and a half ago             Review of Systems    Positive for stated complaint: ams  Other systems are as noted in HPI. Constitutional and vital signs reviewed.       All other systems reviewed All other components within normal limits   HEPATIC FUNCTION PANEL (7) - Abnormal; Notable for the following components:    AST 49 (*)     Alkaline Phosphatase 124 (*)     Total Protein 8.6 (*)     Albumin 2.7 (*)     All other components within normal discussed admission for antibiotics and further evaluation of his hilar mass. He declines. He states that he will follow-up as instructed. His vital signs are stable. Will prescribe antibiotics and encourage follow-up.               Disposition and Plan

## 2020-08-11 NOTE — ED NOTES
Reviewed discharge information with patient using . Patient verbalized understanding, no further questions or complaints at this time.  Patient is alert and orientated x4, in no apparent distress, escorted to lobby where spouse is picking up patie

## 2021-02-07 ENCOUNTER — APPOINTMENT (OUTPATIENT)
Dept: GENERAL RADIOLOGY | Facility: HOSPITAL | Age: 56
End: 2021-02-07
Attending: EMERGENCY MEDICINE
Payer: MEDICAID

## 2021-02-07 ENCOUNTER — APPOINTMENT (OUTPATIENT)
Dept: CT IMAGING | Facility: HOSPITAL | Age: 56
End: 2021-02-07
Attending: EMERGENCY MEDICINE
Payer: MEDICAID

## 2021-02-07 ENCOUNTER — HOSPITAL ENCOUNTER (EMERGENCY)
Facility: HOSPITAL | Age: 56
Discharge: HOME | End: 2021-02-07
Attending: EMERGENCY MEDICINE
Payer: MEDICAID

## 2021-02-07 VITALS
OXYGEN SATURATION: 92 % | SYSTOLIC BLOOD PRESSURE: 116 MMHG | BODY MASS INDEX: 27.2 KG/M2 | DIASTOLIC BLOOD PRESSURE: 75 MMHG | HEIGHT: 70 IN | HEART RATE: 79 BPM | RESPIRATION RATE: 19 BRPM | TEMPERATURE: 99 F | WEIGHT: 190 LBS

## 2021-02-07 DIAGNOSIS — J40 BRONCHITIS: Primary | ICD-10-CM

## 2021-02-07 DIAGNOSIS — J44.1 COPD EXACERBATION (HCC): ICD-10-CM

## 2021-02-07 LAB
ANION GAP SERPL CALC-SCNC: 3 MMOL/L (ref 0–18)
BASOPHILS # BLD AUTO: 0.01 X10(3) UL (ref 0–0.2)
BASOPHILS NFR BLD AUTO: 0.3 %
BUN BLD-MCNC: 28 MG/DL (ref 7–18)
BUN/CREAT SERPL: 24.3 (ref 10–20)
CALCIUM BLD-MCNC: 8.2 MG/DL (ref 8.5–10.1)
CHLORIDE SERPL-SCNC: 103 MMOL/L (ref 98–112)
CO2 SERPL-SCNC: 30 MMOL/L (ref 21–32)
CREAT BLD-MCNC: 1.15 MG/DL
D DIMER PPP FEU-MCNC: 1.28 UG/ML FEU (ref ?–0.56)
DEPRECATED RDW RBC AUTO: 48.2 FL (ref 35.1–46.3)
EOSINOPHIL # BLD AUTO: 0.04 X10(3) UL (ref 0–0.7)
EOSINOPHIL NFR BLD AUTO: 1.1 %
ERYTHROCYTE [DISTWIDTH] IN BLOOD BY AUTOMATED COUNT: 15.3 % (ref 11–15)
GLUCOSE BLD-MCNC: 98 MG/DL (ref 70–99)
HCT VFR BLD AUTO: 37 %
HGB BLD-MCNC: 11.8 G/DL
IMM GRANULOCYTES # BLD AUTO: 0.02 X10(3) UL (ref 0–1)
IMM GRANULOCYTES NFR BLD: 0.5 %
LYMPHOCYTES # BLD AUTO: 0.33 X10(3) UL (ref 1–4)
LYMPHOCYTES NFR BLD AUTO: 9 %
MCH RBC QN AUTO: 27.5 PG (ref 26–34)
MCHC RBC AUTO-ENTMCNC: 31.9 G/DL (ref 31–37)
MCV RBC AUTO: 86.2 FL
MONOCYTES # BLD AUTO: 0.4 X10(3) UL (ref 0.1–1)
MONOCYTES NFR BLD AUTO: 10.9 %
NEUTROPHILS # BLD AUTO: 2.88 X10 (3) UL (ref 1.5–7.7)
NEUTROPHILS # BLD AUTO: 2.88 X10(3) UL (ref 1.5–7.7)
NEUTROPHILS NFR BLD AUTO: 78.2 %
NT-PROBNP SERPL-MCNC: 112 PG/ML (ref ?–125)
OSMOLALITY SERPL CALC.SUM OF ELEC: 287 MOSM/KG (ref 275–295)
PLATELET # BLD AUTO: 161 10(3)UL (ref 150–450)
POTASSIUM SERPL-SCNC: 4.1 MMOL/L (ref 3.5–5.1)
PROCALCITONIN SERPL-MCNC: 0.04 NG/ML (ref ?–0.16)
RBC # BLD AUTO: 4.29 X10(6)UL
SARS-COV-2 RNA RESP QL NAA+PROBE: NOT DETECTED
SODIUM SERPL-SCNC: 136 MMOL/L (ref 136–145)
TROPONIN I SERPL-MCNC: <0.045 NG/ML (ref ?–0.04)
WBC # BLD AUTO: 3.7 X10(3) UL (ref 4–11)

## 2021-02-07 PROCEDURE — 71045 X-RAY EXAM CHEST 1 VIEW: CPT | Performed by: EMERGENCY MEDICINE

## 2021-02-07 PROCEDURE — 93010 ELECTROCARDIOGRAM REPORT: CPT | Performed by: EMERGENCY MEDICINE

## 2021-02-07 PROCEDURE — 80048 BASIC METABOLIC PNL TOTAL CA: CPT | Performed by: EMERGENCY MEDICINE

## 2021-02-07 PROCEDURE — 36415 COLL VENOUS BLD VENIPUNCTURE: CPT

## 2021-02-07 PROCEDURE — 85379 FIBRIN DEGRADATION QUANT: CPT | Performed by: EMERGENCY MEDICINE

## 2021-02-07 PROCEDURE — 83880 ASSAY OF NATRIURETIC PEPTIDE: CPT | Performed by: EMERGENCY MEDICINE

## 2021-02-07 PROCEDURE — 71260 CT THORAX DX C+: CPT | Performed by: EMERGENCY MEDICINE

## 2021-02-07 PROCEDURE — 94640 AIRWAY INHALATION TREATMENT: CPT

## 2021-02-07 PROCEDURE — 85025 COMPLETE CBC W/AUTO DIFF WBC: CPT | Performed by: EMERGENCY MEDICINE

## 2021-02-07 PROCEDURE — 84145 PROCALCITONIN (PCT): CPT | Performed by: EMERGENCY MEDICINE

## 2021-02-07 PROCEDURE — 93005 ELECTROCARDIOGRAM TRACING: CPT

## 2021-02-07 PROCEDURE — 99285 EMERGENCY DEPT VISIT HI MDM: CPT

## 2021-02-07 PROCEDURE — 84484 ASSAY OF TROPONIN QUANT: CPT | Performed by: EMERGENCY MEDICINE

## 2021-02-07 RX ORDER — PREDNISONE 20 MG/1
60 TABLET ORAL ONCE
Status: COMPLETED | OUTPATIENT
Start: 2021-02-07 | End: 2021-02-07

## 2021-02-07 RX ORDER — ALBUTEROL SULFATE 90 UG/1
2 AEROSOL, METERED RESPIRATORY (INHALATION) EVERY 4 HOURS PRN
Qty: 1 INHALER | Refills: 0 | Status: SHIPPED | OUTPATIENT
Start: 2021-02-07 | End: 2021-03-09

## 2021-02-07 RX ORDER — IPRATROPIUM BROMIDE AND ALBUTEROL SULFATE 2.5; .5 MG/3ML; MG/3ML
3 SOLUTION RESPIRATORY (INHALATION) EVERY 6 HOURS PRN
Status: DISCONTINUED | OUTPATIENT
Start: 2021-02-07 | End: 2021-02-07

## 2021-02-07 RX ORDER — PREDNISONE 20 MG/1
40 TABLET ORAL DAILY
Qty: 10 TABLET | Refills: 0 | Status: SHIPPED | OUTPATIENT
Start: 2021-02-08 | End: 2021-02-13

## 2021-02-07 RX ORDER — IPRATROPIUM BROMIDE AND ALBUTEROL SULFATE 2.5; .5 MG/3ML; MG/3ML
3 SOLUTION RESPIRATORY (INHALATION) ONCE
Status: COMPLETED | OUTPATIENT
Start: 2021-02-07 | End: 2021-02-07

## 2021-02-07 RX ORDER — DOXYCYCLINE HYCLATE 100 MG/1
100 CAPSULE ORAL 2 TIMES DAILY
Qty: 14 CAPSULE | Refills: 0 | Status: SHIPPED | OUTPATIENT
Start: 2021-02-07 | End: 2021-02-14

## 2021-02-07 NOTE — ED NOTES
Discharge instructions given via Bulgarian interpretor, pt verbalized understanding, ambulatory to exit with prescriptions

## 2021-02-07 NOTE — ED PROVIDER NOTES
Patient Seen in: [facility] AND [facility] Emergency Department      History   Patient presents with:  Difficulty Breathing    Stated Complaint: asthma    HPI/Subjective:   HPI  Patient is a 50-year-old male history of COPD/asthma, hypertension, DVT status post and a half ago             Review of Systems    Positive for stated complaint: asthma  Other systems are as noted in HPI. Constitutional and vital signs reviewed. All other systems reviewed and negative except as noted above.     Physical Exam     ED (*)     BUN/CREA Ratio 24.3 (*)     Calcium, Total 8.2 (*)     All other components within normal limits   D-DIMER - Abnormal; Notable for the following components:    D-Dimer 1.28 (*)     All other components within normal limits   CBC W/ DIFFERENTIAL - A Gladys Alberts MD on 2/07/2021 at 4:04 PM          Ct Chest Pe Aorta (iv Only) (cpt=71260)    Result Date: 2/7/2021  CONCLUSION:  1. Negative for pulmonary embolism.  2. Moderate emphysema with redemonstration of diffuse bronchial wall thickening/mucous nontoxic, no respiratory distress, speaking in full sentences, no increased work of breathing, bilateral wheezing present.     Concern for COPD exacerbation, CHF, PNA, PTX, covid, PE     Labs and imaging consistent with COPD exacerbation and chronic bronchi

## 2021-03-18 ENCOUNTER — HOSPITAL ENCOUNTER (EMERGENCY)
Facility: HOSPITAL | Age: 56
Discharge: HOME | End: 2021-03-19
Attending: EMERGENCY MEDICINE
Payer: MEDICAID

## 2021-03-18 ENCOUNTER — APPOINTMENT (OUTPATIENT)
Dept: GENERAL RADIOLOGY | Facility: HOSPITAL | Age: 56
End: 2021-03-18
Attending: EMERGENCY MEDICINE
Payer: MEDICAID

## 2021-03-18 DIAGNOSIS — J40 BRONCHITIS: Primary | ICD-10-CM

## 2021-03-18 DIAGNOSIS — J45.901 MODERATE ASTHMA WITH EXACERBATION, UNSPECIFIED WHETHER PERSISTENT: ICD-10-CM

## 2021-03-18 LAB
ANION GAP SERPL CALC-SCNC: <0 MMOL/L (ref 0–18)
BASOPHILS # BLD AUTO: 0.01 X10(3) UL (ref 0–0.2)
BASOPHILS NFR BLD AUTO: 0.3 %
BUN BLD-MCNC: 19 MG/DL (ref 7–18)
BUN/CREAT SERPL: 16 (ref 10–20)
CALCIUM BLD-MCNC: 8.2 MG/DL (ref 8.5–10.1)
CHLORIDE SERPL-SCNC: 103 MMOL/L (ref 98–112)
CO2 SERPL-SCNC: 35 MMOL/L (ref 21–32)
CREAT BLD-MCNC: 1.19 MG/DL
DEPRECATED RDW RBC AUTO: 48 FL (ref 35.1–46.3)
EOSINOPHIL # BLD AUTO: 0.08 X10(3) UL (ref 0–0.7)
EOSINOPHIL NFR BLD AUTO: 2.4 %
ERYTHROCYTE [DISTWIDTH] IN BLOOD BY AUTOMATED COUNT: 15.2 % (ref 11–15)
GLUCOSE BLD-MCNC: 72 MG/DL (ref 70–99)
HCT VFR BLD AUTO: 35.9 %
HGB BLD-MCNC: 11 G/DL
IMM GRANULOCYTES # BLD AUTO: 0.04 X10(3) UL (ref 0–1)
IMM GRANULOCYTES NFR BLD: 1.2 %
LYMPHOCYTES # BLD AUTO: 0.34 X10(3) UL (ref 1–4)
LYMPHOCYTES NFR BLD AUTO: 10.2 %
MCH RBC QN AUTO: 26.4 PG (ref 26–34)
MCHC RBC AUTO-ENTMCNC: 30.6 G/DL (ref 31–37)
MCV RBC AUTO: 86.3 FL
MONOCYTES # BLD AUTO: 0.36 X10(3) UL (ref 0.1–1)
MONOCYTES NFR BLD AUTO: 10.8 %
NEUTROPHILS # BLD AUTO: 2.51 X10 (3) UL (ref 1.5–7.7)
NEUTROPHILS # BLD AUTO: 2.51 X10(3) UL (ref 1.5–7.7)
NEUTROPHILS NFR BLD AUTO: 75.1 %
OSMOLALITY SERPL CALC.SUM OF ELEC: 285 MOSM/KG (ref 275–295)
PLATELET # BLD AUTO: 209 10(3)UL (ref 150–450)
POTASSIUM SERPL-SCNC: 4.6 MMOL/L (ref 3.5–5.1)
RBC # BLD AUTO: 4.16 X10(6)UL
SARS-COV-2 RNA RESP QL NAA+PROBE: NOT DETECTED
SODIUM SERPL-SCNC: 137 MMOL/L (ref 136–145)
WBC # BLD AUTO: 3.3 X10(3) UL (ref 4–11)

## 2021-03-18 PROCEDURE — 71045 X-RAY EXAM CHEST 1 VIEW: CPT | Performed by: EMERGENCY MEDICINE

## 2021-03-18 PROCEDURE — 99285 EMERGENCY DEPT VISIT HI MDM: CPT

## 2021-03-18 PROCEDURE — 36415 COLL VENOUS BLD VENIPUNCTURE: CPT

## 2021-03-18 PROCEDURE — 93005 ELECTROCARDIOGRAM TRACING: CPT

## 2021-03-18 PROCEDURE — 93010 ELECTROCARDIOGRAM REPORT: CPT | Performed by: EMERGENCY MEDICINE

## 2021-03-18 PROCEDURE — 94640 AIRWAY INHALATION TREATMENT: CPT

## 2021-03-18 PROCEDURE — 80048 BASIC METABOLIC PNL TOTAL CA: CPT | Performed by: EMERGENCY MEDICINE

## 2021-03-18 PROCEDURE — 85025 COMPLETE CBC W/AUTO DIFF WBC: CPT | Performed by: EMERGENCY MEDICINE

## 2021-03-18 RX ORDER — PREDNISONE 20 MG/1
40 TABLET ORAL ONCE
Status: COMPLETED | OUTPATIENT
Start: 2021-03-18 | End: 2021-03-18

## 2021-03-18 RX ORDER — IPRATROPIUM BROMIDE AND ALBUTEROL SULFATE 2.5; .5 MG/3ML; MG/3ML
3 SOLUTION RESPIRATORY (INHALATION) ONCE
Status: COMPLETED | OUTPATIENT
Start: 2021-03-18 | End: 2021-03-18

## 2021-03-19 VITALS
TEMPERATURE: 98 F | DIASTOLIC BLOOD PRESSURE: 77 MMHG | OXYGEN SATURATION: 99 % | BODY MASS INDEX: 28 KG/M2 | HEART RATE: 90 BPM | SYSTOLIC BLOOD PRESSURE: 109 MMHG | RESPIRATION RATE: 16 BRPM | WEIGHT: 198 LBS

## 2021-03-19 NOTE — CM/SW NOTE
Sent over 90 referrals in [facility] for home health care    Unable to secure home health care with patients insurance

## 2021-03-19 NOTE — CM/SW NOTE
Rec'd report from [facility] re: patient and wife splitting up and patient soon to be homeless and patient requesting NH placement. Patient is Greek speaking only.     ERCM spoke with Dr. Linda Aly - per Dr. Linda Aly patient has a HX of HIV, asthma and is non-compli ERCM reviewed patient's chart.  Patient was discharged to [facility] on 2/27/17 S/P R hip septic arthritis and to [facility] on 4/10/17 for Right hip septic arthritis with abscesses developing on the medial and lateral sides thro

## 2021-03-19 NOTE — CM/SW NOTE
HEDY for [address] @ [phone] to call ERCM back re: SNF placement for patient with eventual need for long term supportive living environment.

## 2021-03-19 NOTE — ED PROVIDER NOTES
Patient endorsed to me by Dr. Evi Ortez for continuation of care. Patient is awaiting placement to SNF, but now is requesting discharge as he is due for his weekly Methadone. he has no difficulty breathing.  assisting in home health followup.

## 2021-03-19 NOTE — ED INITIAL ASSESSMENT (HPI)
Patient aox3 to ed via private vehicle patient reported sob x 1 week patient ran out of inhaler.  Patient chest pain 10/10     Patient last took quantiferon x 1 year ago rn repeatedly asked if patient had tb, patient unable to verify

## 2021-03-19 NOTE — CM/SW NOTE
Referral placed in [facility]idin to 29 facilities. ER encounter summary, face sheet, IP transfer report and ERCM note from 2150 3/18 sent via Aidin. Awaiting response.

## 2021-03-19 NOTE — CM/SW NOTE
Left detailed message for Elena Antunez re: pt's case and wife Hien's contact# [phone] for assistance in possibly getting patient into Supportive Living facility.

## 2021-03-19 NOTE — ED PROVIDER NOTES
Patient Seen in: [facility] AND [facility] Emergency Department      History   Patient presents with:  Difficulty Breathing    Stated Complaint:     HPI/Subjective:   HPI    Patient is a 43-year-old male history of COPD/asthma, hypertension, DVT status post IVC stopped a year and a half ago             Review of Systems    Positive for stated complaint:   Other systems are as noted in HPI. Constitutional and vital signs reviewed. All other systems reviewed and negative except as noted above.     Physical Exa limits   CBC W/ DIFFERENTIAL - Abnormal; Notable for the following components:    WBC 3.3 (*)     RBC 4.16 (*)     HGB 11.0 (*)     HCT 35.9 (*)     MCHC 30.6 (*)     RDW-SD 48.0 (*)     RDW 15.2 (*)     Lymphocyte Absolute 0.34 (*)     All other component file for this visit. Follow-up:  No follow-up provider specified.         Medications Prescribed:  Current Discharge Medication List

## 2021-03-19 NOTE — ED NOTES
Pt attempting to leave ed. Connected to language line via ipad.  was Anahy Iyer #[id number]. Pt continually asking to leave the er. Per language line, pt sts, \"I need to go get my methadone otherwise I am going to act badly and rude towards you. \" At that

## 2021-03-19 NOTE — CM/SW NOTE
LM for P[facility]y at [facility] @ [phone] to call ERCM back re: SNF placement with eventual need for long term supportive living environment.

## 2021-03-19 NOTE — CM/SW NOTE
[facility] and [place] [id number] aide. Face to face entered in epic. SNF referral cancelled in [address] [place] [address]. [place] [address] referral placed in Aidin to 45 providers due to patient's insurance.  ER encounter summary, Face to Face, SW C/S, ER encounter summary, fac

## 2021-03-19 NOTE — ED NOTES
Routine rounding  Patient has no needs at this time. In no distress. Awaiting case management assessment.

## 2021-03-19 NOTE — CM/SW NOTE
Called by [facility] patient insisting on going home.  ERCM met with patient with intrepretor line and reminded patient it was his request to not go home last night and to go into a \"nursing home to help him with his medications\" because he is going to be ho ER RN will provide patient with discharge instructions.

## 2022-04-05 NOTE — PLAN OF CARE
Patient has been medically cleared and will be transferred to [address] for physical therapy. 98

## (undated) DEVICE — DRAPE SRG U 124X80IN U REINF

## (undated) DEVICE — SOL  .9 1000ML BTL

## (undated) DEVICE — 2T9 -0- UNDYED MONODERM 36X36: Brand: 2T9 -0- UNDYED MONODERM 36X36

## (undated) DEVICE — PEN: MARKING STD PT 100/CS: Brand: MEDICAL ACTION INDUSTRIES

## (undated) DEVICE — SUTURE ETHILON 2-0 460T

## (undated) DEVICE — INTENDED FOR TISSUE SEPARATION, AND OTHER PROCEDURES THAT REQUIRE A SHARP SURGICAL BLADE TO PUNCTURE OR CUT.: Brand: BARD-PARKER ® STAINLESS STEEL BLADES

## (undated) DEVICE — STERILE POLYISOPRENE POWDER-FREE SURGICAL GLOVES: Brand: PROTEXIS

## (undated) DEVICE — ABDOMINAL PAD: Brand: CURITY

## (undated) DEVICE — NON-ADHERENT PAD PREPACK: Brand: TELFA

## (undated) DEVICE — NEEDLE SPINAL 18X3-1/2 PINK.

## (undated) DEVICE — CULTURE COLLECT/TRANSPORT SYS

## (undated) DEVICE — TOWEL OR BLU 16X26 STRL

## (undated) DEVICE — 2T8 #2 PDO 24 X 24: Brand: 2T8 #2 PDO 24 X 24

## (undated) DEVICE — SUTURE MONOCRYL 2-0 SH

## (undated) DEVICE — DRESSING BRN ABS ANMC NCRSTLN

## (undated) DEVICE — 60 ML SYRINGE LUER-LOCK TIP: Brand: MONOJECT

## (undated) DEVICE — BATTERY

## (undated) DEVICE — GOWN SURG AERO BLUE PERF LG

## (undated) DEVICE — STANDARD HYPODERMIC NEEDLE,POLYPROPYLENE HUB: Brand: MONOJECT

## (undated) DEVICE — 3M™ IOBAN™ 2 ANTIMICROBIAL INCISE DRAPE 6651EZ: Brand: IOBAN™ 2

## (undated) DEVICE — COVER SGL STRL LGHT HNDL BLU

## (undated) DEVICE — FAN SPRAY KIT: Brand: PULSAVAC®

## (undated) DEVICE — 3M(TM) TEGADERM(TM) TRANSPARENT FILM DRESSING FRAME STYLE 1628: Brand: 3M™ TEGADERM™

## (undated) DEVICE — Device: Brand: POWER-FLO®

## (undated) DEVICE — Device

## (undated) DEVICE — DRAPE SRG 70X60IN SPLT U IMPRV

## (undated) DEVICE — 3M™ STERI-DRAPE™ U-DRAPE 1015: Brand: STERI-DRAPE™

## (undated) DEVICE — SUTURE PDS II 1 CT-1

## (undated) DEVICE — SPONGE LAP 18X18 XRAY STRL

## (undated) DEVICE — SUTURE VICRYL 1 CT-1

## (undated) DEVICE — PROXIMATE SKIN STAPLERS (35 WIDE) CONTAINS 35 STAINLESS STEEL STAPLES (FIXED HEAD): Brand: PROXIMATE

## (undated) DEVICE — DRESSING AQUACEL AG 3.5 X 10

## (undated) DEVICE — T5 HOOD WITH PEEL AWAY FACE SHIELD

## (undated) DEVICE — 1010 S-DRAPE TOWEL DRAPE 10/BX: Brand: STERI-DRAPE™

## (undated) DEVICE — HEMOCLIP MED 24 CLIP/CARTRIDGE

## (undated) DEVICE — NEEDLE HPO 18GA 1.5IN ECLPS

## (undated) DEVICE — BLADE SAW SAGITTAL 19.5

## (undated) DEVICE — REPEL LITE CUT REST SURGICAL GLV LNRS X-LG: Brand: REPEL

## (undated) DEVICE — CAUTERY TIP BLADE EXTENSION

## (undated) DEVICE — 2T11 #2 PDO 36 X 36: Brand: 2T11 #2 PDO 36 X 36

## (undated) DEVICE — ABSORBABLE HEMOSTAT (OXIDIZED REGENERATED CELLULOSE, U.S.P.): Brand: SURGICEL

## (undated) DEVICE — REM POLYHESIVE ADULT PATIENT RETURN ELECTRODE: Brand: VALLEYLAB

## (undated) DEVICE — LOWER EXTREMITY: Brand: MEDLINE INDUSTRIES, INC.

## (undated) DEVICE — SUCTION CANISTER, 3000CC,SAFELINER: Brand: DEROYAL

## (undated) DEVICE — 3M™ MEDITPORE™ SOFT CLOTH TAPE 6 IN X 10 YD 12 ROLLS/CASE 2966: Brand: 3M™ MEDIPORE™

## (undated) DEVICE — PILLOW ABDUCTION HIP MED

## (undated) DEVICE — DRAIN RESERVOIR RELIAVAC 100CC

## (undated) DEVICE — OCCLUSIVE GAUZE STRIP OVERWRAP,3% BISMUTH TRIBROMOPHENATE IN PETROLATUM BLEND: Brand: XEROFORM

## (undated) DEVICE — CONTAINER SPEC STR 4OZ GRY LID

## (undated) DEVICE — PACK CDS TOTAL HIP

## (undated) DEVICE — 3M™ COBAN™ NL STERILE NON-LATEX SELF-ADHERENT WRAP, 2084S, 4 IN X 5 YD (10 CM X 4,5 M), 18 ROLLS/CASE: Brand: 3M™ COBAN™

## (undated) DEVICE — TRAY SRGPRP PVP IOD WT SCRB SM

## (undated) DEVICE — SOL  .9 3000ML

## (undated) DEVICE — SOLUTION SURG DURA PREP HAZMAT

## (undated) DEVICE — REPEL CUT REST SURGICAL GLV LNRS MED: Brand: REPEL

## (undated) DEVICE — GAUZE SPONGES,12 PLY: Brand: CURITY

## (undated) DEVICE — CULTURE TUBE ANAEROBIC

## (undated) DEVICE — VIOLET BRAIDED (POLYGLACTIN 910), SYNTHETIC ABSORBABLE SUTURE: Brand: COATED VICRYL

## (undated) DEVICE — SUTURE MONOCRYL 2-0 Y945H

## (undated) DEVICE — SUTURE ETHIBOND 1 CT-1

## (undated) DEVICE — SUTURE VICRYL 1-0 J977H

## (undated) DEVICE — SUTURE MONOCRYL 3-0 Y936H

## (undated) DEVICE — SUTURE PDS II 2-0 CP

## (undated) DEVICE — DERMABOND LIQUID ADHESIVE

## (undated) DEVICE — 20 ML SYRINGE LUER-LOCK TIP: Brand: MONOJECT

## (undated) DEVICE — 12 ML SYRINGE LUER-LOCK TIP: Brand: MONOJECT

## (undated) NOTE — LETTER
Hospital Discharge Documentation  Please phone to schedule a hospital follow up appointment.     From: [address] Hospitalist's Office  Phone: [phone]    Patient discharged time/date: 4/10/2017  6:31 PM  Patient discharge disposition:  [facility] to the emergency department with complaint of right lower extremity swelling, pain, and pain in the right hip.  He had a low-grade temperature of 100.7, C-reactive protein of 9, sedimentation rate of 92.  Chemistry showed sodium 134, BUN 21, crea positive culture for Streptococcus pneumoniae.  Also, in February 2016 and March 2016, he had positive cultures for Staphylococcus epidermidis and Staphylococcus hominis.  The patient was supposed to be on IV Rocephin until April 2, 2017.  Recent diagnosis - IVF  - monitor    Anemia of Chronic disease  - hgb stable  - s/p 2 units prbc    DVT RLE  - IR placed IVC filter 2/21  - resume lovenox starting 4/1 at prophylactic dosing- discussed with Dr. Michael Albarran.  Pt to continue 40mg daily for 2 weeks, then resume thera Commonly known as:  ROXICODONE        Take 1 tablet (10 mg total) by mouth every 4 (four) hours as needed.     Quantity:  30 tablet   Refills:  0       OxyCODONE HCl ER 20 MG T12a   Last time this was given:  20 mg on 4/10/2017  9:30 AM   Commonly known as: Cyclobenzaprine HCl 10 MG Tabs   Last time this was given:  10 mg on 4/5/2017 10:22 AM   Commonly known as:  cyclobenzaprine   What changed:  Another medication with the same name was removed.  Continue taking this medication, and follow the directions you What changed:  Another medication with the same name was removed. Continue taking this medication, and follow the directions you see here. Take 100 mg by mouth 2 (two) times daily.     Refills:  0       Metoprolol Succinate ER 25 MG Tb24   Last time Commonly known as:  DOLOPHINE           oxyCODONE-acetaminophen  MG Tabs   Commonly known as:  PERCOCET           sodium chloride 0.9 % SOLN 100 mL with CefTRIAXone Sodium 1 g SOLR 1 g   Replaced by:  sodium chloride 0.9 % SOLN 100 mL with CefTRIAXon -----------------------------------------------------------------------------------------------  PATIENT DISCHARGE INSTRUCTIONS: See electronic chart  Hospital Discharge Diagnoses: septic hip    TCM Diagnosis at discharge from Hospital: Other: septic hip;

## (undated) NOTE — ED AVS SNAPSHOT
Yeni Paz   MRN: [id number]    Department:  [facility] Emergency Department   Date of Visit:  10/23/2019           Disclosure     Insurance plans vary and the physician(s) referred by the ER may not be covered by your plan.  Please cont CARE PHYSICIAN AT ONCE OR RETURN IMMEDIATELY TO THE EMERGENCY DEPARTMENT. If you have been prescribed any medication(s), please fill your prescription right away and begin taking the medication(s) as directed.   If you believe that any of the medications

## (undated) NOTE — LETTER
[address] [facility], [facility] [address]     I agree to have a Peripherally Inserted Central Catheter (PICC) placed in my arm.      1. The PICC insertion procedure, care, maintenance, risks, benefits, and complications Statement of Physician: My signature below affirms that prior to the time of the PICC line insertion, I have explained to the patient and/or his/her legal representative, the risks and benefits involved in the proposed treatment and any reasonable alternat

## (undated) NOTE — LETTER
Date & Time: 11/29/2018, 10:55 PM  Patient: Cassi Mccracken  Encounter Provider(s):    Foster Mello MD       To Whom It May Concern: Krishna Marques was seen and treated in our department on 11/29/2018.  He should not return to work until 12/1

## (undated) NOTE — ED AVS SNAPSHOT
Bethany Maria   MRN: [id number]    Department:  [facility] Emergency Department   Date of Visit:  10/30/2018           Disclosure     Insurance plans vary and the physician(s) referred by the ER may not be covered by your plan.  Please cont CARE PHYSICIAN AT ONCE OR RETURN IMMEDIATELY TO THE EMERGENCY DEPARTMENT. If you have been prescribed any medication(s), please fill your prescription right away and begin taking the medication(s) as directed.   If you believe that any of the medications

## (undated) NOTE — ED AVS SNAPSHOT
Ranjeet Hoover   MRN: [id number]    Department:  [facility] Emergency Department   Date of Visit:  11/29/2018           Disclosure     Insurance plans vary and the physician(s) referred by the ER may not be covered by your plan.  Please cont CARE PHYSICIAN AT ONCE OR RETURN IMMEDIATELY TO THE EMERGENCY DEPARTMENT. If you have been prescribed any medication(s), please fill your prescription right away and begin taking the medication(s) as directed.   If you believe that any of the medications

## (undated) NOTE — ED AVS SNAPSHOT
Cherelle Tyler   MRN: [id number]    Department:  [facility] Emergency Department   Date of Visit:  10/3/2018           Disclosure     Insurance plans vary and the physician(s) referred by the ER may not be covered by your plan.  Please conta CARE PHYSICIAN AT ONCE OR RETURN IMMEDIATELY TO THE EMERGENCY DEPARTMENT. If you have been prescribed any medication(s), please fill your prescription right away and begin taking the medication(s) as directed.   If you believe that any of the medications

## (undated) NOTE — IP AVS SNAPSHOT
[address]  [address] [facility] [phone]                Discharge Summary   3/22/2017    [facility]           Admission Information        Provider Department    3/22/2017 Vanda Snyder MD Em Rosa Adkins                   4/10/17           famotidine 10 MG Tabs   Last time this was given:  10 mg on 4/10/2017  9:17 AM   Commonly known as:  PEPCID   Next dose due:  4/10/17        Take 1 tablet (10 mg total) by mouth 2 (two) times daily.     Melani Hernandez Take 1 tablet (50 mg total) by mouth every 6 (six) hours as needed for Pain.     Lance Jama                           Zolpidem Tartrate 5 MG Tabs   Last time this was given:  5 mg on 4/7/2017 11:51 PM   Commonly known as:  AMBIEN        Take 1 tablet Inject 0.4 mL (40 mg total) into the skin daily. Chapo Cruz                           ferrous sulfate 325 (65 FE) MG [facility]   Last time this was given:  325 mg on 4/10/2017  5:54 PM   What changed:  Another medication with the same name was removed. Last time this was given:  300 mg on 4/10/2017  9:27 AM   Generic drug:  Atazanavir Sulfate   What changed:  Another medication with the same name was removed. Continue taking this medication, and follow the directions you see here.    Next dose due:  4/11/ Information about where to get these medications is not yet available     !  Ask your nurse or doctor about these medications    - Enoxaparin Sodium 40 MG/0.4ML Soln  - Metoprolol Succinate ER 25 MG Tb24  - sodium chloride 0.9 % SOLN 100 mL with CefTRIAXone 3.14 (L) (04/06/17)  9.1 (L) (04/06/17)  27.1 (L) (04/06/17)  86.2    (04/06/17)  292 (04/06/17)  6.1 (L)    (04/05/17)  3.6 (L) (04/05/17)  3.05 (L) (04/05/17)  8.8 (L) (04/05/17)  26.5 (L) (04/05/17)  86.8    (04/05/17)  314 (04/05/17)  6.0 (L)      Rece Patient [address] to help you get signed up for insurance coverage. Patient [address] is a Federal Navigator program that can help with your Affordable Care Act coverage, as well as all types of Medicaid plans.   To get signed up and covere emtricitabine-tenofovir 200-300 MG Oral Tab    ritonavir (NORVIR) 100 MG Oral Cap    Atazanavir Sulfate (REYATAZ) 300 MG Oral Cap         Use: Treat infections or suspected infection   Most common side effects:  Allergic reactions, rash, nausea, diarrhea acetaminophen 325 MG Oral Tab    Cyclobenzaprine HCl 10 MG Oral Tab       Use: Treat pain, fever, inflammation   Most common side effects: Stomach upset   What to report to your healthcare team: Stomach upset, unresolved pain           GI Medications Use: Treat conditions such as depression and thought disorders   Most common side effects: Dizziness, drowsiness, problems with movement   What to report to your healthcare team: Changes in thinking, talking or movement, drowsiness, shaking           Calm

## (undated) NOTE — ED AVS SNAPSHOT
[facility] Emergency Department    Natalie [address]     [address] [facility] [address]    Phone:  [phone]    Fax:  [address]   MRN: [id number]    Department:  [facility] Emergency Department   Date of Visit:  5 and Class Registration line at [phone] or find a doctor online by visiting [email or web address].    IF THERE IS ANY CHANGE OR WORSENING OF YOUR CONDITION, CALL YOUR PRIMARY CARE PHYSICIAN AT ONCE OR RETURN IMMEDIATELY TO [address] [place] [address].     If

## (undated) NOTE — ED AVS SNAPSHOT
Deepa Velazquez   MRN: [id number]    Department:  [facility] Emergency Department   Date of Visit:  9/8/2019           Disclosure     Insurance plans vary and the physician(s) referred by the ER may not be covered by your plan.  Please contac CARE PHYSICIAN AT ONCE OR RETURN IMMEDIATELY TO THE EMERGENCY DEPARTMENT. If you have been prescribed any medication(s), please fill your prescription right away and begin taking the medication(s) as directed.   If you believe that any of the medications

## (undated) NOTE — IP AVS SNAPSHOT
Patient Demographics     Address Phone    [address] [phone] [facility])      Emergency Contact(s)     Name Relation Home Work Mobile    Kim Stanford Spouse [phone]        Allergies as of 3/10/2017  Reviewed on: 3/6 Take 1 tablet (325 mg total) by mouth 2 (two) times daily with meals. Kalen Mcfarland                           gabapentin 100 MG Caps   Last time this was given:  100 mg on 3/10/2017  8:49 AM   Commonly known as:  NEURONTIN   Next dose due:   Tomorrow Umeclidinium Bromide 62.5 MCG/INH Aepb   Last time this was given:  1 puff on 3/10/2017  8:50 AM   Commonly known as:  INCRUSE ELLIPTA   Next dose due: Tomorrow morning. Inhale 1 puff into the lungs daily.                                  Where to [id number] Umeclidinium Bromide (INCRUSE ELLIPTA) 62.5 MCG/INH inhaler 1 puff 03/10/17 0850 Given      [id number] Vancomycin HCl (VANCOCIN) 1,750 mg in sodium chloride 0.9 % 500 mL IVPB 03/10/17 1029 New Bag      [id number] emtricitabine-tenofovir (Vandana Lucas) BASIC METABOLIC PANEL (8) [[id number]] (Abnormal)  Resulted: 03/10/17 0733, Result status: Final result    Ordering provider: Nori Balderrama MD  03/09/17 0709 Resulting lab: [facility] LAB    Specimen Information    Type Source Collected On   Blood  03/10/1 9733 [facility] [address]  [facility]  [place] [address] 04/29/14 1047 - Present            Microbiology Results (All)     Procedure Component Value Units Date/Time    Blood Culture Once [[id number]] Collected:  03/07/1 Patient at this time is unaware of why he was sent from the nursing home continues to complain of right hip pain 10 out of 10 aggravated by movement and relieved only by pain medication. He denies any other complaints at this time.   He was started on vanc Sig: Take 1 tablet (325 mg total) by mouth 2 (two) times daily with meals. fluconazole 200 MG Oral Tab 3/5/2017 at Unknown time  No Yes   Sig: Take 1 tablet (200 mg total) by mouth daily.    gabapentin 100 MG Oral Cap 3/5/2017 at Unknown time  No Yes   Si Gastrointestinal:  Soft, non-tender, non-distended, normal bowel sounds, no organomegaly. Lymphatics:  No lymphadenopathy neck, axilla, groin. Musculoskeletal: Unable to move right hip secondary to pain.   Incision site clean dry and intact no erythema or Oleg Woods MD  3/6/2017  6:36 AM         Electronically signed by Helene Link MD on 3/6/2017  7:16 AM               Consults - MD Consult Notes      Consults by Nicole Graves MD at 3/10/2017  3:34 PM     Author:  Nicole Graves MD Service: •  acetaminophen (TYLENOL) tab 650 mg, 650 mg, Oral, Q6H PRN  •  Enoxaparin Sodium (LOVENOX) 80 MG/0.8ML injection 70 mg, 1 mg/kg, Subcutaneous, 2 times per day  •  ferrous sulfate EC tab 325 mg, 325 mg, Oral, BID with meals  •  gabapentin (NEURONTIN) cap Staples. Left PICC line clean dry intact. No hand swelling. Psychiatric: Appropriate mood and affect. Neurologic: No focal neurological deficits.     Laboratory Data:    Lab Results  Component Value Date   WBC 1.7 03/10/2017   HGB 8.6 03/10/2017   HCT 2 with VRE. We are not treating a urinary tract infection regardless. Mundo daley considered for any further ortho reconstruction.      HAART restarted--reyataz/norvir/truvada    Tiffanie Salmeron       Electronically signed by Tony Franco MD on 3/10/2017 L Lower Extremity: Weight Bearing as Tolerated    PAIN ASSESSMENT   20/10 RLE    BALANCE                                                                                                                      Static Sitting: Good  Dynamic Sitting: Good Current Status    NA           Physical Therapy Note by Supriya De Los Santos PT at 3/10/2017  1:47 PM  Version 1 of 2    Author:  Supriya De Los Santos PT Service:  (none) Author Type:  Physical Therapist    Filed:  3/10/2017  2:06 PM Note Time:  3/10/2017  1:47 Dynamic Sitting: Good           Static Standing: Fair  Dynamic Standing: Poor +    ACTIVITY TOLERANCE  No shortness of breath    AM-PAC '6-Clicks' INPATIENT SHORT FORM - BASIC MOBILITY  How much difficulty does the patient currently have. ..  -   Turning ov Filed:  3/9/2017  3:21 PM Note Time:  3/9/2017  3:19 PM Status:  Signed    :  Nicolas Easton (PT Student) Cosigner:  Georgie Rosario PTA at 3/9/2017  3:22 PM         Pt refused to amb or transfer from bed to chair due to pain in R hip.  Will atte Filed:  3/10/2017 11:06 AM Note Time:  3/10/2017 10:06 AM Status:  Signed    :  Robinson Dumont OT (Occupational Therapist)           OCCUPATIONAL THERAPY TREATMENT NOTE - INPATIENT     Room Number: 555/555-A   Presenting Problem:  (Fever)    Proble ACTIVITIES OF DAILY LIVING ASSESSMENT  AM-PAC ‘6-Clicks’ Inpatient Daily Activity Short Form  How much help from another person does the patient currently need…  -   Putting on and taking off regular lower body clothing?: A Little  -   Bathing (including w right leg. RN aware and reports pt may be transferred to another hospital today. Will continue to follow. Video Swallow Study Notes     No notes of this type exist for this encounter.       SLP Notes     No notes of this type exist for this en

## (undated) NOTE — ED AVS SNAPSHOT
Magda Adams   MRN: [id number]    Department:  [facility] Emergency Department   Date of Visit:  5/13/2019           Disclosure     Insurance plans vary and the physician(s) referred by the ER may not be covered by your plan.  Please conta CARE PHYSICIAN AT ONCE OR RETURN IMMEDIATELY TO THE EMERGENCY DEPARTMENT. If you have been prescribed any medication(s), please fill your prescription right away and begin taking the medication(s) as directed.   If you believe that any of the medications

## (undated) NOTE — LETTER
[address], [place], [place]    AUTORIZACIÓN PARA Frosty Leyland  1 Por medio de [address] [address] [facility] y wilson(s) Asistente(s), a llevar a cabo la siguiente operación y/o proc 8. Entiendo que el(la) doctor(a) y amelia médicos asistentes no son empleados o agentes del hospital, sino profesionales médicos independientes a quienes el hospital ha permitido usar amelia instalaciones para Priti Harder y tratamiento de amelia Vanamõisa.   9. Gianni Bustillo respondido a las preguntas del(la) paciente(My signature below affirms that prior to the time of the procedure, I have explained to the patient and/or his/her guardian, therisks and benefits involved in the proposed treatment and any reasonable alternative

## (undated) NOTE — IP AVS SNAPSHOT
[address]  [address] [facility] [phone]                Discharge Summary   [facility]           Admission Information        Provider Department    3/5/2017 Ortiz Reaves MD [facility] 5sw/ Inject 1.5 g into the vein every 12 (twelve) hours.     Corwin Davenport taking these medications        Instructions Authorizing Provider    Morning Afternoon Evening As Needed    acetaminophen 325 MG Tabs   Commonly oxyCODONE-acetaminophen  MG Tabs   Last time this was given:  1 tablet on 3/10/2017  5:41 AM   Commonly known as:  PERCOCET   Next dose due:  Last dose was today at 0540. Take 1 tablet by mouth every 4 (four) hours as needed. Recent Hematology Lab Results (cont.)  (Last 3 results in the past 90 days)    Neutrophil % Lymphocyte % Monocyte % Eosinophil % Basophil % Prelim Neut Abs Final Neut Abs Lymphocyte Abso Monocyte Absolu Eosinophil Abso Basophil Absolu    (03/10/17)  38 (03 harming yourself, contact [facility] and Referral Center at [phone]. - If you don’t have insurance, Charis Jacobson has partnered with [address] to help you get signed up for insurance coverage.   Patient [place] your medications while at home.          Ant-Infective Medications     Atazanavir Sulfate 300 MG Oral Cap    emtricitabine-tenofovir 200-300 MG Oral Tab    Ritonavir 100 MG Oral Tab    sodium chloride 0.9 % SOLN 100 mL with CefTRIAXone Sodium 1 g SOLR 1 g no bowel movement in 2+ days, unresolved pain           Non-Narcotic Pain Medications     acetaminophen 325 MG Oral Tab    Cyclobenzaprine HCl 10 MG Oral Tab       Use: Treat pain, fever, inflammation   Most common side effects: Stomach upset   What to rep

## (undated) NOTE — IP AVS SNAPSHOT
Patient Demographics     Address Phone    [address] [phone] [facility])      Emergency Contact(s)     Name Relation Home Work Mobile    None,Given  [phone]        Allergies as of 2/27/2017  Reviewed on: 2/15/2017    N Enoxaparin Sodium 80 MG/0.8ML Soln   Last time this was given:  80 mg on 2/27/2017  8:36 AM   Commonly known as:  LOVENOX   Next dose due:  02/27/2017        Inject 0.8 mL (80 mg total) into the skin every 12 (twelve) hours.     Juju Ramon Next dose due:  02/28/2017        Inject 1 g into the vein daily.    Stop taking on:  4/2/2017    Kalen Mcfarland                           Umeclidinium Bromide 62.5 MCG/INH Aepb   Last time this was given:  1 puff on 2/27/2017  8:37 AM   Commonly known as: [id number] Umeclidinium Bromide (INCRUSE ELLIPTA) 62.5 MCG/INH inhaler 1 puff 02/27/17 0837 Given      [id number] acetaminophen (TYLENOL) tab 650 mg 02/26/17 1616 Given  Temp 100.4    [id number] ferrous sulfate EC tab 325 mg 02/26/17 1745 Given      00001200 ---------                               -----------         ------                     CBC W/ DIFFERENTIAL[[id number]]          Abnormal            Final result                 Please view results for these tests on the individual orders.     Spe Blood Smear *****Positive Blood Culture*****       Gram positive cocci in pairs and chains     Blood Culture FREQ X 2 [[id number]]  (Abnormal)  (Susceptibility) Collected:  02/15/17 1624    Order Status:  Completed Lab Status:  Final result Updated:  02/1 Order Status:  Completed Lab Status:  Final result Updated:  02/15/17 0751    Specimen Information:  Other from Nares     Narrative: The following orders were created for panel order ED/MRSA SCREEN BY PCR-CC.   Procedure from an outside facility was ambulating with the assistance of crutches however fell recently and since then has been experiencing excruciating pain described as a 10 out of 10 in his right hip and knee aggravated by the slightest movement with no relievin Musculoskeletal: Unable to move right hip at this time secondary to pain, tender to palpation as well, appears somewhat anteriorly displaced  Feet:  Normal pulses. Neurologic:  Alert, oriented, no focal deficits, cranial nerves II-XII are grossly intact. :  Thelma Urias MD (Physician)       Consult Orders:    1.  IP consult to Hematology Once [[id number]] ordered by Perlita Chadwick MD at 02/22/17 8238                Hematology Consultation Note    Patient Name: Genevie Every   Date [facility] • HIV (human immunodeficiency virus infection) ([facility] [address]    • Arrhythmia    • Osteoarthritis    • High cholesterol    • High blood pressure        Past Surgical History:      Past Surgical History    REMOVAL OF LUNG,LOBECTOMY      HERNIA SURGERY         Family Metoprolol Succinate ER (Toprol XL) 24 hr tab 12.5 mg 12.5 mg Oral 2x Daily(Beta Blocker) Raman Fontana MD 12.5 mg at 02/22/17 1730    fluconazole (DIFLUCAN) tab 200 mg 200 mg Oral Daily Mason Gongora  mg at 02/22/17 3552    CefTRIAXone Sodium (Daryl Moeller Neurological: Negative for headaches, dizziness, speech problems, gait problems and weakness. A comprehensive 14 point review of systems was completed. Pertinent positives and negatives noted in the the HPI.      Vital Signs:  /60 mmHg  Pulse 101 Impression:  45year old male, with a past medical history significant for HIV with noncompliance to ART presents with complaint of right hip and knee pain status post fall at home.   He underwent orthopedic surgical evaluation for his condition and i --Evaluating for other potential causes of anemia such as nutritional deficiencies like B12 and folate deficiency as well as evaluating for hemolysis  --Patients with sepsis and might be experiencing some decreased bone marrow function which is also worsen participation in therapy. Does not want do do any functional ROM or WB on the R LE. Cont to have swelling on the R LE on scrotum. Pt will benefit from ongoing PT to continue to improve strength, endurance, balance, and gait in order to facilitate to his hig Raw Score: 12   PT Approx Degree of Impairment Score: 68.66%   Standardized Score (AM-PAC Scale): 35.33   CMS Modifier (G-Code): CL    FUNCTIONAL ABILITY STATUS  Gait Assessment   Gait Assistance: Not tested     Assistive Device: Rolling walker        Comm No notes of this type exist for this encounter. Video Swallow Study Notes     No notes of this type exist for this encounter. SLP Notes     No notes of this type exist for this encounter.

## (undated) NOTE — ED AVS SNAPSHOT
[facility] Emergency Department    Natalie [address]     [address] [place] [address]    Phone:  [phone]    Fax:  [address]   MRN: [id number]    Department:  [facility] Emergency Department   Date of Visit:  5 If you have difficulty scheduling your follow-up appointment as directed, please call our  at [phone]. Si tiene problemas para programar nataliia anthony de seguimiento según lo indicado, llame al encargado de gabriel al [phone].     It i continue to take your medications as instructed by your Primary Care doctor until you can check with your doctor. Please bring the medication list to your next doctor's appointment.     Any imaging studies and labs completed today can be reviewed in your [place] Medicaid plans. To get signed up and covered, please call [phone] and ask to get set up for an insurance coverage that is in-network with [facility] Dom     Sign up for [employer], your secure online medical record.   [employer] wi

## (undated) NOTE — ED AVS SNAPSHOT
Kristan Pedro Luis   MRN: [id number]    Department:  [facility] Emergency Department   Date of Visit:  7/6/2019           Disclosure     Insurance plans vary and the physician(s) referred by the ER may not be covered by your plan.  Please contac CARE PHYSICIAN AT ONCE OR RETURN IMMEDIATELY TO THE EMERGENCY DEPARTMENT. If you have been prescribed any medication(s), please fill your prescription right away and begin taking the medication(s) as directed.   If you believe that any of the medications

## (undated) NOTE — LETTER
Date & Time: 6/7/2020, 2:24 AM  Patient:  [address]  Encounter Provider(s):    Juliana Ellison MD         This certifies that Dimitrioscarlos Elena, a patient at an [facility], am leaving the facility voluntarily and again

## (undated) NOTE — ED AVS SNAPSHOT
Jannie Schmidt   MRN: [id number]    Department:  [facility] Emergency Department   Date of Visit:  10/17/2018           Disclosure     Insurance plans vary and the physician(s) referred by the ER may not be covered by your plan.  Please cont CARE PHYSICIAN AT ONCE OR RETURN IMMEDIATELY TO THE EMERGENCY DEPARTMENT. If you have been prescribed any medication(s), please fill your prescription right away and begin taking the medication(s) as directed.   If you believe that any of the medications

## (undated) NOTE — IP AVS SNAPSHOT
Patient Demographics     Address Phone    [address] [phone] [facility])      Emergency Contact(s)     Name Relation Home Work Mobile    Kim Stanofrd Spouse [phone]      Gustavo Lopez [phone]        Al Alum & Mag Hydroxide-Simeth [phone] MG/5ML Susp   Commonly known as:  MAALOX        Take 30 mL by mouth 4 (four) times daily as needed for Indigestion.     Adam Licea                           Calcium Carbonate Antacid 500 MG Chew   C Take 1 tablet (325 mg total) by mouth 2 (two) times daily with meals.     Florestine Apt                              gabapentin 300 MG Caps   Last time this was given:  100 mg on 4/10/2017  1:31 PM   Commonly known as:  NEURONTIN   Next dose due:  4/1 Last time this was given:  300 mg on 4/10/2017  9:27 AM   Generic drug:  Atazanavir Sulfate   Next dose due:  4/11/17        Take 300 mg by mouth daily with breakfast.                            Sennosides 17.2 MG Tabs   Last time this was given:  17.2 mg - OxyCODONE HCl IR 10 MG Tabs  - TraMADol HCl 50 MG Tabs      Information about where to get these medications is not yet available     !  Ask your nurse or doctor about these medications    - Enoxaparin Sodium 40 MG/0.4ML Soln  - Metoprolol Succinate ER 25 [id number] celecoxib (CeleBREX) cap 200 mg 04/09/17 2008 Given      [id number] celecoxib (CeleBREX) cap 200 mg 04/10/17 0930 Given      [id number] docusate sodium (COLACE) cap 100 mg 04/09/17 2007 Given      [id number] docusate sodium (COLACE) cap 100 mg 04/1 Tissue Aerobic Culture [[id number]] Collected:  03/31/17 0843    Order Status:  Completed Lab Status:  Final result Updated:  04/03/17 0924    Specimen Information:  Tissue from Femoral bone right      Tissue Culture Result No Growth 3 Days      Tissue Sme spacer. Further, given his poorly controlled HIV/AIDS and overall non-compliance, his likelihood of recurrent or persistent infection is extremely high, making him a suboptimal candidate for ultimate reconstruction.  As such, I've offered him either a Girdl fluconazole 200 MG Oral Tab Take 200 mg by mouth daily. Disp:  Rfl:     umeclidinium-vilanterol 62.5-25 MCG/INH Inhalation Aerosol Powder, Breath Activated Inhale 1 puff into the lungs daily.  Disp:  Rfl:     oxyCODONE-acetaminophen  MG Oral Tab Take Activated Inhale 1 puff into the lungs daily.  Disp:  Rfl:  3/5/2017 at Unknown time         Current Facility-Administered Medications:  scopolamine (TRANSDERM-SCOP) 1.5 mg patch 1 patch Transdermal Q72H   acetaminophen (OFIRMEV) infusion 1,000 mg 1,000 mg HIP REPLACEMENT SURGERY      REMOVAL OF LUNG,LOBECTOMY      HERNIA SURGERY       Family History   Problem Relation Age of Onset   • Diabetes Father    • Hypertension Father    • Heart Disorder Father    • Diabetes Mother    • Heart Disorder Mother    • Hy Mundo. Also with RLE DVT s/p IVC filter 2/2 R hip hematoma.   Patient returned with worsening R hip pain with MRI and CT R hip findings of advanced erosovie arthritis consistent with septic arthritis with synovitis and new fluid collections in the soft t DilTIAZem HCl (CARDIZEM) injection 10 mg 10 mg Intravenous Q1H PRN   DilTIAZem HCl (CARDIZEM) 125 mg in sodium chloride 0.9 % 125 mL IVPB 2.5-20 mg/hr Intravenous Continuous   DilTIAZem HCl (CARDIZEM) tab 60 mg 60 mg Oral PRN   [DISCONTINUED] HYDROmorphone [DISCONTINUED] ondansetron HCl (ZOFRAN) injection 4 mg 4 mg Intravenous Once PRN   [DISCONTINUED] Prochlorperazine Edisylate (COMPAZINE) injection 5 mg 5 mg Intravenous Once PRN   [DISCONTINUED] haloperidol lactate (HALDOL) 5 MG/ML injection 0.25 mg 0.25 m bisacodyl (DULCOLAX) rectal suppository 10 mg 10 mg Rectal Daily PRN   [] FLEET ENEMA (FLEET) 7-19 GM/118ML enema 133 mL 1 enema Rectal Once PRN   ondansetron HCl (ZOFRAN) injection 4 mg 4 mg Intravenous Q4H PRN   [] Metoclopramide HCl (RHIANNON [DISCONTINUED] sodium chloride 0.9 % irrigation   Continuous PRN   [DISCONTINUED] Tobramycin Sulfate (NEBCIN) 1.2 g injection   PRN   TraMADol HCl (ULTRAM) tab 50 mg 50 mg Oral Q6H PRN   Enoxaparin Sodium (LOVENOX) 40 MG/0.4ML injection 40 mg 40 mg Subcuta [COMPLETED] sodium chloride 0.9 % infusion      [DISCONTINUED] oxyCODONE-acetaminophen (PERCOCET) 5-325 MG per tab 1 tablet 1 tablet Oral Q4H PRN   Vancomycin HCl (VANCOCIN) 750 mg in sodium chloride 0.9 % 250 mL IVPB 750 mg Intravenous Q8H   [COMPLETED] S [DISCONTINUED] docusate sodium (COLACE) cap 100 mg 100 mg Oral BID   [DISCONTINUED] PEG 3350 (MIRALAX) powder packet 17 g 17 g Oral Daily PRN   [DISCONTINUED] magnesium hydroxide (MILK OF MAGNESIA) 400 MG/5ML suspension 30 mL 30 mL Oral Daily PRN   [DISCON HERNIA SURGERY         Family History:   Family History   Problem Relation Age of Onset   • Diabetes Father    • Hypertension Father    • Heart Disorder Father    • Diabetes Mother    • Heart Disorder Mother    • Hypertension Mother        Social History: Neuro: CN 2-12 grossly intact, sensation intact to LT in BUE/BLE; negative hoffmans bilaterally; speech clear and fluent    Data Review:      Lab Results  Component Value Date   WBC 3.4 04/04/2017   HGB 8.2 04/04/2017   HCT 24.8 04/04/2017    04/04/ Filed:  4/10/2017  5:29 PM Note Time:  4/10/2017  5:23 PM Status:  Signed    :  Laura Farfan MD (Physician)           [address] Patient Status:  Inpatient    3/31/1965 MRN [id number]   L and destructive changes of the right hip and the acetabulum, persistent inflammatory synovitis and complex joint effusion, and more pronounced marked surrounding deep and soft tissue edema, chronic tearing of the right gluteus minimus and medius related to at [facility] for definitive reconstruction of his right hip.      Brief Synopsis:   Right hip septic arthritis with abscesses developing on the medial and lateral sides through the compartment musculature, as outlined above via an MRI report.   - ID *Right hip girdlestone resection arthroplasty with placement of antibiotic beads  *IVC filter         Discharge Medication List:     Discharge Medications      START taking these medications       Instructions Prescription details    Alum & Mag Hydroxide-S Take 17 g by mouth daily as needed. Refills:  0       Sennosides 17.2 MG Tabs   Last time this was given:  17.2 mg on 4/9/2017  8:07 PM        Take 1 tablet (17.2 mg total) by mouth nightly.     Refills:  0       sodium chloride 0.9 % SOLN 100 mL with C What changed:  Another medication with the same name was removed. Continue taking this medication, and follow the directions you see here. Take 1 tablet by mouth daily.     Refills:  0       Enoxaparin Sodium 40 MG/0.4ML Soln   Last time this was giv Take 1 tablet (25 mg total) by mouth Daily Beta Blocker. Stop taking on:  5/10/2017   Quantity:  30 tablet   Refills:  0       NORVIR 100 MG Caps   Generic drug:  ritonavir   What changed:  Another medication with the same name was removed.  Continue ta Bring a paper prescription for each of these medications    - celecoxib 200 MG Caps  - ferrous sulfate 325 (65 FE) MG Tbec  - OxyCODONE HCl ER 20 MG T12a  - OxyCODONE HCl IR 10 MG Tabs  - TraMADol HCl 50 MG Tabs      Information about where to get these m Lace+ Score: 86  59-90 High Risk  29-58 Medium Risk  0-28 Low Risk    Risk of readmission: Obie Sykes has High Risk of readmission after discharge from the hospital.        Dennys Rodriguez MD 4/10/2017    Time spent:  > 30 minutes           Elec WEIGHT BEARING RESTRICTION  Weight Bearing Restriction: R lower extremity        R Lower Extremity: Non-Weight Bearing       PAIN ASSESSMENT   Rating: 10  Location: right hip  Management Techniques: Relaxation;Repositioning; Activity promotion    BALANCE  S Patient End of Session: Up in chair;Needs met;Call light within reach;RN aware of session/findings; All patient questions and concerns addressed    CURRENT GOALS       Goals to be met by: 4/15/17,goals updated 4/10/17  Patient Ludy Kim Patient's self-stated Presenting Problem:  (R hip resection arthroplasty)    Problem List  Principal Problem:    Pyogenic arthritis of right hip, due to unspecified organism [facility])  Active Problems:    Pyogenic arthritis of right hip (HCC)    AIDS (acquired immune deficiency syn -   Putting on and taking off regular lower body clothing?: A Lot  -   Bathing (including washing, rinsing, drying)?: A Lot  -   Toileting, which includes using toilet, bedpan or urinal? : A Little  -   Putting on and taking off regular upper body clothing

## (undated) NOTE — ED AVS SNAPSHOT
Iris Montes De Oca   MRN: [id number]    Department:  [facility] Emergency Department   Date of Visit:  1/23/2020           Disclosure     Insurance plans vary and the physician(s) referred by the ER may not be covered by your plan.  Please conta CARE PHYSICIAN AT ONCE OR RETURN IMMEDIATELY TO THE EMERGENCY DEPARTMENT. If you have been prescribed any medication(s), please fill your prescription right away and begin taking the medication(s) as directed.   If you believe that any of the medications

## (undated) NOTE — LETTER
Date & Time: 5/29/2020, 2:24 AM  Patient:  Chucky Mccracken  Encounter Provider(s):    Abe Sandoval MD         This certifies that Mark White, a patient at [facility], am leaving the facility voluntarily and ag

## (undated) NOTE — ED AVS SNAPSHOT
Delores Farrell   MRN: [id number]    Department:  [facility] Emergency Department   Date of Visit:  1/6/2020           Disclosure     Insurance plans vary and the physician(s) referred by the ER may not be covered by your plan.  Please contac CARE PHYSICIAN AT ONCE OR RETURN IMMEDIATELY TO THE EMERGENCY DEPARTMENT. If you have been prescribed any medication(s), please fill your prescription right away and begin taking the medication(s) as directed.   If you believe that any of the medications

## (undated) NOTE — ED AVS SNAPSHOT
[facility] Emergency Department    Natalie [address]     [place] [address]    Phone:  [phone]    Fax:  [phone] [facility]   MRN: [id number]    Department:  [facility] Emergency Department   Date of Visit:  4/ Place 1 patch onto the skin daily. CHANGE how you take these medications     docusate sodium 100 MG Caps   Quantity:  28 capsule   Commonly known as:  COLACE   Take 1 capsule (100 mg total) by mouth 2 (two) times daily as needed for constipation. our [address] at [phone]. Your Emergency Department team is here to serve you. You are our top priority. You were examined and treated today on an urgent basis only. This was not a substitute for ongoing medical care.  Often, one Emergency Dep pertaining to these instructions have been answered in a satisfactory manner. 24-Hour Pharmacies        Pharmacy Address Phone Number   Koffi Taylor [address] [facility] ([address]) [address] [facility] ([address] [place] [address] your Zip Code and Date of Birth to complete the sign-up process. If you do not sign up before the expiration date, you must request a new code.     Your unique [employer] Access Code: [email or web address]  Expires: 4/28/2017 12:16 PM    If you have questions, you can c

## (undated) NOTE — ED AVS SNAPSHOT
[facility] Emergency Department    Shameka. [address]     [place] [address]    Phone:  [phone]    [facility]  [phone] [facility]   MRN: [id number]    Department:  [facility] Emergency Department   Date of Visit:  4/ and Class Registration line at [phone] or find a doctor online by visiting [email or web address].    IF THERE IS ANY CHANGE OR WORSENING OF YOUR CONDITION, CALL YOUR PRIMARY CARE PHYSICIAN AT ONCE OR RETURN IMMEDIATELY TO [address] [place] [address].     If

## (undated) NOTE — IP AVS SNAPSHOT
[address], [address] [phone]                Discharge Summary   2/15/2017    [facility]           Admission Information        Provider Department    2/15/2017 Virginie Mensah MD [facility] 5s Last time this was given:  200 mg on 2/27/2017  8:36 AM   Commonly known as:  DIFLUCAN   Next dose due:  02/28/2017        Take 1 tablet (200 mg total) by mouth daily.    Stop taking on:  3/6/2017    Kalen Mcfarland                           gabapentin 100 Umeclidinium Bromide 62.5 MCG/INH Aepb   Last time this was given:  1 puff on 2/27/2017  8:37 AM   Commonly known as:  INCRUSE ELLIPTA   Next dose due:  02/28/2017        Inhale 1 puff into the lungs daily.                               STOP taking these m [phone]        Immunization History as of 2/27/2017  Never Reviewed    No immunizations on file. Recent Hematology Lab Results  (Last 3 results in the past 90 days)    WBC RBC Hemoglobin Hematocrit MCV MCH MCHC RDW Platelet MPV    (19/69/90)  2. 0 Patient Belongings       Most Recent Value    All belongings returned to patient at discharge Pt's bedside belongings    Medications Sent Home     Medications Returned:           Additional Information       We are concerned for your overall well being: As your caregivers, we want you to be aware of the medications you are prescribed to take and their potential SIDE EFFECTS. Your nurse will review your medications with you before you are discharged, and can provide you with additional printed information. oxyCODONE-acetaminophen  MG Oral Tab       Use:  Treat pain   Most common side effects: Constipation, drowsiness, dizziness, urinary retention (inability to urinate)   What to report to your healthcare team: Difficulty urinating, dizziness, no bowel

## (undated) NOTE — ED AVS SNAPSHOT
Yeni Paz   MRN: [id number]    Department:  [facility] Emergency Department   Date of Visit:  9/21/2018           Disclosure     Insurance plans vary and the physician(s) referred by the ER may not be covered by your plan.  Please conta CARE PHYSICIAN AT ONCE OR RETURN IMMEDIATELY TO THE EMERGENCY DEPARTMENT. If you have been prescribed any medication(s), please fill your prescription right away and begin taking the medication(s) as directed.   If you believe that any of the medications

## (undated) NOTE — ED AVS SNAPSHOT
Susan Batista   MRN: [id number]    Department:  [facility] Emergency Department   Date of Visit:  2/17/2020           Disclosure     Insurance plans vary and the physician(s) referred by the ER may not be covered by your plan.  Please conta CARE PHYSICIAN AT ONCE OR RETURN IMMEDIATELY TO THE EMERGENCY DEPARTMENT. If you have been prescribed any medication(s), please fill your prescription right away and begin taking the medication(s) as directed.   If you believe that any of the medications

## (undated) NOTE — LETTER
Date & Time: 2/18/2020, 2:51 AM  Patient:  Bharti Lim Nawaf  Encounter Provider(s):    Cade Copeland MD         This certifies that I, Rich Person, a patient at [facility], am leaving the facility voluntarily and

## (undated) NOTE — ED AVS SNAPSHOT
Kristan Cloud   MRN: [id number]    Department:  [facility] Emergency Department   Date of Visit:  11/3/2018           Disclosure     Insurance plans vary and the physician(s) referred by the ER may not be covered by your plan.  Please conta CARE PHYSICIAN AT ONCE OR RETURN IMMEDIATELY TO THE EMERGENCY DEPARTMENT. If you have been prescribed any medication(s), please fill your prescription right away and begin taking the medication(s) as directed.   If you believe that any of the medications